# Patient Record
Sex: FEMALE | Race: BLACK OR AFRICAN AMERICAN | NOT HISPANIC OR LATINO | Employment: FULL TIME | URBAN - METROPOLITAN AREA
[De-identification: names, ages, dates, MRNs, and addresses within clinical notes are randomized per-mention and may not be internally consistent; named-entity substitution may affect disease eponyms.]

---

## 2017-01-03 ENCOUNTER — TELEPHONE (OUTPATIENT)
Dept: OBSTETRICS AND GYNECOLOGY | Facility: CLINIC | Age: 37
End: 2017-01-03

## 2017-01-03 DIAGNOSIS — Z30.430 ENCOUNTER FOR IUD INSERTION: Primary | ICD-10-CM

## 2017-01-03 NOTE — TELEPHONE ENCOUNTER
----- Message from Radha Wheeler sent at 1/3/2017  2:27 PM CST -----  Contact: Patient  _1st Request  _ 2nd Request  _X 3rd Request        Who: TAMAR BARAKAT [[id number]]     Why: Patient is calling to schedule a IUD procedure. She states per midwife she has to schedule when her menstrual cycle is down and its approaching soon.     What Number to Call Back: Patient can be reached at [phone]     When to Expect a call back: (Before the end of the day)  -- if call after 3:00 call back will be tomorrow.

## 2017-01-18 ENCOUNTER — PROCEDURE VISIT (OUTPATIENT)
Dept: OBSTETRICS AND GYNECOLOGY | Facility: CLINIC | Age: 37
End: 2017-01-18
Attending: OBSTETRICS & GYNECOLOGY
Payer: COMMERCIAL

## 2017-01-18 VITALS
WEIGHT: 214.5 LBS | BODY MASS INDEX: 36.62 KG/M2 | HEIGHT: 64 IN | DIASTOLIC BLOOD PRESSURE: 70 MMHG | SYSTOLIC BLOOD PRESSURE: 100 MMHG

## 2017-01-18 DIAGNOSIS — Z30.430 ENCOUNTER FOR IUD INSERTION: Primary | ICD-10-CM

## 2017-01-18 PROCEDURE — 58300 INSERT INTRAUTERINE DEVICE: CPT | Mod: S$GLB,,, | Performed by: OBSTETRICS & GYNECOLOGY

## 2017-01-18 NOTE — PROCEDURES
Procedures   DATE: 01/18/2017    TIME: 1:23 PM    PROCEDURE:  ParaGard insertion under ultrasound guidance    INDICATION: contraception.  Patient has a history of a mirena, strings were not visible and a hysteroscopy was performed for removal.  At that time, perforation of the uterus occurred at the time of removal of the device requiring ex-lap.  Patient presents today for placement under ultrasound guidance.    PATIENT CONSENT: She was counseled on the risks, benefits, indications, and alternatives to IUD use.  She understands that with insertion there is a risk of bleeding, infection, and uterine perforation.  All questions are answered.  Consents were signed and witnessed by clinical staff.     LABS: UPT is negative.     Cervical cultures were not performed.    ANESTHESIA: NONE    PROCEDURE NOTE:    Time out performed.  The cervix was visualized with a speculum.  A single tooth tenaculum was placed on the anterior lip of the cervix.  The uterus sounds to 7cm using sterile technique.  The uterus was visualized by Transabdominal U/S and monitored throughout the entire procedure with realtime U/S.  A ParaGard was loaded and placed high in the uterine fundus without difficulty using sterile technique.  Position was verified by Ultrasound.  The string was was then cut.  The tenaculum and speculum were removed.    COMPLICATIONS: None    PATIENT DISPOSITION: The patient tolerated the procedure well.    Assessment:  1.  Contraceptive management/IUD insertion    Post IUD placement counseling:  Manage post IUD placement pain with NSAIDS, Tylenol or Rx per Medcard.  IUD danger signs and how to check for strings were discussed.  The IUD needs to be removed in 5 years for Mirena and 10 years for Copper IUD.    Follow up:  4 weeks for string check      Laurie Campos DO 01/18/2017 1:23 PM

## 2017-01-18 NOTE — MR AVS SNAPSHOT
[facility] - OB/[facility]N [address]  [address]  [place] [place] [address]  Phone: [phone]  Fax: [phone]                  Veronique Louise   2017 10:30 AM   Procedure visit    Description:  Female : 1980   Provider:  Laurie Campos DO   Department:  [facility] - OB/GYN [address]           Reason for Visit     Procedure           Diagnoses this Visit        Comments    Encounter for IUD insertion    -  Primary            To Do List           Future Appointments        Provider Department Dept Phone    2017 10:00 AM Kristi Avendaño CNM [facility] OBGYN [phone]    3/20/2017 8:30 AM LAB, [facility] [phone]      Goals (5 Years of Data)     None      [facility]     [facility] -  Assistance  Registered nurses in the [facility] Center provide clinical advisement, health education, appointment booking, and other advisory services.  Call for this free service at [phone].             Medications           Message regarding Medications     Verify the changes and/or additions to your medication regime listed below are the same as discussed with your clinician today.  If any of these changes or additions are incorrect, please notify your healthcare provider.             Verify that the below list of medications is an accurate representation of the medications you are currently taking.  If none reported, the list may be blank. If incorrect, please contact your healthcare provider. Carry this list with you in case of emergency.           Current Medications     buPROPion (WELLBUTRIN XL) 150 MG TB24 tablet 300 mg.     estradiol (ESTRACE) 0.5 MG tablet Take 0.5 mg by mouth once daily.    progesterone (PROMETRIUM) 100 MG capsule Take 100 mg by mouth once daily.           Clinical Reference Information           Vital Signs - Last Recorded  Most recent update: 2017 11:25 AM by Sita Granados MA     "BP Ht Wt LMP BMI    100/70 5' 4" (1.626 m) 97.3 kg (214 lb 8.1 oz) 12/10/2016 36.82 kg/m2      Blood Pressure          Most Recent Value    BP  100/70      Allergies as of 1/18/2017     No Known Allergies      Immunizations Administered on Date of Encounter - 1/18/2017     None      Orders Placed During Today's Visit      Normal Orders This Visit    US OB/GYN Procedure (Viewpoint)       "

## 2017-02-17 ENCOUNTER — OFFICE VISIT (OUTPATIENT)
Dept: OBSTETRICS AND GYNECOLOGY | Facility: CLINIC | Age: 37
End: 2017-02-17
Attending: OBSTETRICS & GYNECOLOGY
Payer: COMMERCIAL

## 2017-02-17 VITALS
WEIGHT: 218.25 LBS | BODY MASS INDEX: 37.26 KG/M2 | HEIGHT: 64 IN | SYSTOLIC BLOOD PRESSURE: 124 MMHG | DIASTOLIC BLOOD PRESSURE: 82 MMHG

## 2017-02-17 DIAGNOSIS — Z30.431 IUD CHECK UP: Primary | ICD-10-CM

## 2017-02-17 PROCEDURE — 99999 PR PBB SHADOW E&M-EST. PATIENT-LVL II: CPT | Mod: PBBFAC,,, | Performed by: OBSTETRICS & GYNECOLOGY

## 2017-02-17 PROCEDURE — 99499 UNLISTED E&M SERVICE: CPT | Mod: S$GLB,,, | Performed by: OBSTETRICS & GYNECOLOGY

## 2017-02-17 NOTE — MR AVS SNAPSHOT
"    [facility] [address]  [address]  [facility] [place] [address]  Phone: [phone]  Fax: [phone]                  Veronique Louise   2017 9:30 AM   Office Visit    Description:  Female : 1980   Provider:  Laurie Campos DO   Department:  [facility] - OB/GYN [address]           Reason for Visit     IUD Check                To Do List           Future Appointments        Provider Department Dept Phone    3/20/2017 8:30 AM LAB, [facility] [phone]      Goals (5 Years of Data)     None      [facility]     [facility] On [facility] Nurse Care [facility] -  Assistance  Registered nurses in the [facility] provide clinical advisement, health education, appointment booking, and other advisory services.  Call for this free service at [phone].             Medications           Message regarding Medications     Verify the changes and/or additions to your medication regime listed below are the same as discussed with your clinician today.  If any of these changes or additions are incorrect, please notify your healthcare provider.             Verify that the below list of medications is an accurate representation of the medications you are currently taking.  If none reported, the list may be blank. If incorrect, please contact your healthcare provider. Carry this list with you in case of emergency.           Current Medications     buPROPion (WELLBUTRIN XL) 150 MG TB24 tablet 300 mg.     estradiol (ESTRACE) 0.5 MG tablet Take 0.5 mg by mouth once daily.    progesterone (PROMETRIUM) 100 MG capsule Take 100 mg by mouth once daily.           Clinical Reference Information           Your Vitals Were     BP Height Weight Last Period BMI    124/82 5' 4" (1.626 m) 99 kg (218 lb 4.1 oz) 2016 37.46 kg/m2      Blood Pressure          Most Recent Value    BP  124/82      Allergies as of 2017     No Known Allergies      Immunizations Administered " on Date of Encounter - 2/17/2017     None      [facility] Sign-Up     Activating your [facility] account is as easy as 1-2-3!     1) Visit [email or web address], select Sign Up Now, enter this activation code and your date of birth, then select Next.  [email or web address]  Expires: 4/3/2017  9:44 AM      2) Create a username and password to use when you visit [facility] in the future and select a security question in case you lose your password and select Next.    3) Enter your e-mail address and click Sign Up!    Additional Information  If you have questions, please e-mail [email or web address] or call [phone] to talk to our [facility] staff. Remember, [facility] is NOT to be used for urgent needs. For medical emergencies, dial 911.         Language Assistance Services     ATTENTION: Language assistance services are available, free of charge. Please call [phone].      ATENCIÓN: Si habla español, tiene a stewart disposición servicios gratuitos de asistencia lingüística. Llame al [phone].     DAGOBERTO Ý: N?u b?n nói Ti?ng Vi?t, có các d?ch v? h? tr? ngôn ng? mi?n phí dành cho b?n. G?i s? [phone].         [facility] [address] complies with applicable Federal civil rights laws and does not discriminate on the basis of race, color, national origin, age, disability, or sex.

## 2018-07-31 ENCOUNTER — LAB VISIT (OUTPATIENT)
Dept: LAB | Facility: OTHER | Age: 38
End: 2018-07-31
Payer: COMMERCIAL

## 2018-07-31 ENCOUNTER — OFFICE VISIT (OUTPATIENT)
Dept: OBSTETRICS AND GYNECOLOGY | Facility: CLINIC | Age: 38
End: 2018-07-31
Payer: COMMERCIAL

## 2018-07-31 VITALS
BODY MASS INDEX: 36.28 KG/M2 | WEIGHT: 212.5 LBS | HEIGHT: 64 IN | SYSTOLIC BLOOD PRESSURE: 124 MMHG | DIASTOLIC BLOOD PRESSURE: 82 MMHG

## 2018-07-31 DIAGNOSIS — Z01.419 ENCOUNTER FOR GYNECOLOGICAL EXAMINATION WITHOUT ABNORMAL FINDING: ICD-10-CM

## 2018-07-31 DIAGNOSIS — B00.9 HSV (HERPES SIMPLEX VIRUS) INFECTION: Primary | ICD-10-CM

## 2018-07-31 DIAGNOSIS — Z00.00 PREVENTATIVE HEALTH CARE: ICD-10-CM

## 2018-07-31 DIAGNOSIS — Z11.3 SCREEN FOR STD (SEXUALLY TRANSMITTED DISEASE): ICD-10-CM

## 2018-07-31 DIAGNOSIS — Z86.32 HX GESTATIONAL DIABETES: ICD-10-CM

## 2018-07-31 LAB
HAV IGM SERPL QL IA: NEGATIVE
HBV CORE IGM SERPL QL IA: NEGATIVE
HBV SURFACE AG SERPL QL IA: NEGATIVE
HCV AB SERPL QL IA: NEGATIVE
HIV 1+2 AB+HIV1 P24 AG SERPL QL IA: NEGATIVE
RPR SER QL: NORMAL

## 2018-07-31 PROCEDURE — 99395 PREV VISIT EST AGE 18-39: CPT | Mod: S$GLB,,, | Performed by: ADVANCED PRACTICE MIDWIFE

## 2018-07-31 PROCEDURE — 36415 COLL VENOUS BLD VENIPUNCTURE: CPT

## 2018-07-31 PROCEDURE — 86592 SYPHILIS TEST NON-TREP QUAL: CPT

## 2018-07-31 PROCEDURE — 87660 TRICHOMONAS VAGIN DIR PROBE: CPT

## 2018-07-31 PROCEDURE — 99999 PR PBB SHADOW E&M-EST. PATIENT-LVL III: CPT | Mod: PBBFAC,,, | Performed by: ADVANCED PRACTICE MIDWIFE

## 2018-07-31 PROCEDURE — 87491 CHLMYD TRACH DNA AMP PROBE: CPT

## 2018-07-31 PROCEDURE — 86703 HIV-1/HIV-2 1 RESULT ANTBDY: CPT

## 2018-07-31 PROCEDURE — 87624 HPV HI-RISK TYP POOLED RSLT: CPT

## 2018-07-31 PROCEDURE — 80074 ACUTE HEPATITIS PANEL: CPT

## 2018-07-31 PROCEDURE — 88175 CYTOPATH C/V AUTO FLUID REDO: CPT

## 2018-07-31 RX ORDER — BUSPIRONE HYDROCHLORIDE 10 MG/1
10 TABLET ORAL 3 TIMES DAILY
COMMUNITY
End: 2019-12-13 | Stop reason: CLARIF

## 2018-07-31 RX ORDER — METHIMAZOLE 5 MG/1
2.5 TABLET ORAL DAILY
COMMUNITY
End: 2022-07-26

## 2018-07-31 RX ORDER — VALACYCLOVIR HYDROCHLORIDE 1 G/1
1000 TABLET, FILM COATED ORAL DAILY
Qty: 10 TABLET | Refills: 3 | Status: SHIPPED | OUTPATIENT
Start: 2018-07-31 | End: 2018-08-10

## 2018-07-31 NOTE — PROGRESS NOTES
Veronique Louise is a 38 y.o.  who presents today for her annual GYN exam.    C/C: annual GYN well woman preventative exam    HPI: She is currently sexually active and uses IUD for contraception. Happy with contraceptive method and wants to continue. Denies dyspareunia.  Has no specific GYN related concerns. Accepts STD testing. Reports long term chronic medical conditions - HSV - requests prn rx. No current sx.     MENSTRUAL HISTORY  Patient's last menstrual period was 2018.. She reports regular menses occurring every 28-30 days.  Menses last 5 days with moderate flow.  She denies dysmenorrhea.  She denies intermenstrual bleeding.    PAP HISTORY: last pap  negative    SOCIAL HISTORY: Denies emotional/mental/physical/sexual violence or abuse. Feels safe at home.    Review of patient's allergies indicates:  No Known Allergies  Past Medical History:   Diagnosis Date    Abnormal Pap smear age 20    Anemia     Breast abscess     L breast ; abscess I&D by surgery    DM (diabetes mellitus), gestational 2015    HSV infection     Nipple infection, obstetric, antepartum 2014    Postpartum depression 2013    Subclinical hyperthyroidism 2016     Past Surgical History:   Procedure Laterality Date    ABCESS DRAINAGE Left     L breast abscess I&D by surgery    INTRAUTERINE DEVICE INSERTION      uterine perforation during IUD removal. Surgical exploration to ensure no bowel perforation. No futher uterine surgery except IUD or scope perforation.    TONSILLECTOMY, ADENOIDECTOMY       Past Surgical History:   Procedure Laterality Date    ABCESS DRAINAGE Left     L breast abscess I&D by surgery    INTRAUTERINE DEVICE INSERTION      uterine perforation during IUD removal. Surgical exploration to ensure no bowel perforation. No futher uterine surgery except IUD or scope perforation.    TONSILLECTOMY, ADENOIDECTOMY       OB History    Para Term  AB  Living   3 3 3     2   SAB TAB Ectopic Multiple Live Births   0 0   0 2      # Outcome Date GA Lbr Rafael/2nd Weight Sex Delivery Anes PTL Lv   3 Term 04/04/15 39w6d / 00:06 3.86 kg (8 lb 8.2 oz) F Vag-Spont None N BRIGHT   2 Term 13 40w0d  3.402 kg (7 lb 8 oz) F Vag-Spont None N       Birth Comments:    1 Term 10/22/09 40w0d  3.374 kg (7 lb 7 oz) F Vag-Spont None N BRIGHT        OB History      Para Term  AB Living    3 3 3     2    SAB TAB Ectopic Multiple Live Births    0 0   0 2        Social History     Social History    Marital status:      Spouse name: Hayden - William    Number of children: N/A    Years of education: N/A     Occupational History    [employer]      used to do event planning     Social History Main Topics    Smoking status: Never Smoker    Smokeless tobacco: Never Used    Alcohol use 0.6 oz/week     1 Glasses of wine per week      Comment: drinks socially when not pregnant    Drug use: No    Sexual activity: Yes     Partners: Male     Birth control/ protection: IUD      Comment: mutually monogamous - planned pregnancy     Other Topics Concern    Not on file     Social History Narrative    Pt: ADITI, grew up in [place].    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    Pt's last child, Luz Marina, , choked on grape 2014. Pt does not wish to review this / visit grief at PNC visits.     Family History   Problem Relation Age of Onset    Diabetes Mother         Type II    Hypertension Mother     Colon cancer Father         dx at 62yo, past at 65yo 2246-0012    Hypertension Father     Colon cancer Paternal Grandfather         dx in 80s,  year later    Hypertension Sister     Breast cancer Neg Hx     Ovarian cancer Neg Hx      History   Sexual Activity    Sexual activity: Yes    Partners: Male    Birth control/ protection: IUD     Comment: mutually monogamous - planned pregnancy       Primary Doctor No     ROS  Constitutional/Gen:  "Negative--fever, weight change, fatigue   Skin:  Negative--Hirsutism, acne, rash  CV/vasc: Negative--chest pain, palpitations, syncope  Resp:  Negative--Cough, shortness of breath, wheezing  GI:  Negative--Nausea, vomiting, diarrhea, constipation, abdominal pain  :  Negative--Dysuria, vaginal discharge, genital sores, dyspareunia   Lymph:  Negative--Swollen glands, frequent infection  Heme:  Negative--Easy bruising or bleeding, clot  Breast: Negative--Mass, lump, nipple discharge, tenderness  MS:  Negative--Back/joint pain, muscle weakness, difficulty walking  Neuro: Negative--Numbness, tingling, confusion, headaches, seizures, dizziness  Psych: Negative--Depressed mood, anxiety, insomnia  Endocrine:  Negative--Polydipsia, polyuria, heat or cold intolerance    OBJECTIVE:  /82   Ht 5' 4" (1.626 m)   Wt 96.4 kg (212 lb 8.4 oz)   LMP 07/16/2018   BMI 36.48 kg/m²   Gen:  NAD, appears stated age, well groomed  Skin: warm and dry w/o rash  Head: normocephalic  Mouth: no caries  Neck: supple, no masses or enlargement  Lung: normal resp effort, CTAB  Heart: normal HR, RRR   Back: negative CVAT  Breast: bilaterally--no masses, tenderness, skin changes, or nipple discharge noted. Nipple inverted bilaterally.  Abdomen: soft, nontender, no masses, and bowel sounds normal  External genitalia: no lesions or discharge, normal hair distribution  Urethral meatus: normal size and location, no lesions or prolapse  Vagina: normal appearance, no lesions, no discharge, no evidence cystocele or rectocele.  Cervix: IUD strings visible at 2cm from os, cervix has normal appearance, no discharge, no lesions, negative CMT  Uterus: nontender, mobile, normal size, contour, and position.   Adnexa: no masses or tenderness  Anus: normal appearance, with no lesions or discharge. Internal exam deferred.  Extremities: FROM, with no edema or tenderness.  Neurologic: A&O x 4, non-focal, cranial nerves 2-12 grossly intact  Psych:  appropriate " affect and no signs of mood, thought or memory difficulty appreciated    ASSESSMENT/PLAN:   38 y.o. female  for GYN annual well woman exam  -- Discussed ASCCP pap guidelines. Last pap  negative. Pap today.   -- Accepts STD testing today.  -- Known hx of HSV. Rx provided. Reviewed health implications and contagious nature of virus.   -- Health Teaching:  Discussed appropriate weight and nutrition. Emphasized importance of calcium and vitamin D intake. Encouraged exercise, 30-40 min 3x a week or more. BSE and health maintenance reviewed. Discussed following PCP for annual health screening. Discussed BMI and normal ranges.   --Mammogram to start at 43yo  --Continue annual exams, return in 1 year or sooner prn    Contraceptive counseling, status, surveillance  -- IUD in place; she desires to continue method at this time    Personal hx of GDM  -- Encouraged annual testing. She states she is seeing endocrinologist Dr Stacey Chand at  and is being watched for thyroid issues. States has fasting labs scheduled there soon and will be completing DM screening as well. Encouraged annual testing for DM d/t hx GDM.    Fam hx colon CA  -- Encouraged to start surveillance at 43yo        Updated Medication List:  Current Outpatient Prescriptions   Medication Sig Dispense Refill    buPROPion (WELLBUTRIN XL) 150 MG TB24 tablet 300 mg.       busPIRone (BUSPAR) 10 MG tablet Take 10 mg by mouth 3 (three) times daily.      methIMAzole (TAPAZOLE) 5 MG Tab Take 5 mg by mouth 3 (three) times daily.      valACYclovir (VALTREX) 1000 MG tablet Take 1 tablet (1,000 mg total) by mouth once daily. for 10 days 10 tablet 3     No current facility-administered medications for this visit.         Yolis Huang CNM/NP  2018 12:26 PM

## 2018-08-01 LAB
CANDIDA RRNA VAG QL PROBE: POSITIVE
G VAGINALIS RRNA GENITAL QL PROBE: NEGATIVE
T VAGINALIS RRNA GENITAL QL PROBE: NEGATIVE

## 2018-08-02 ENCOUNTER — TELEPHONE (OUTPATIENT)
Dept: OBSTETRICS AND GYNECOLOGY | Facility: CLINIC | Age: 38
End: 2018-08-02

## 2018-08-02 LAB
C TRACH DNA SPEC QL NAA+PROBE: NOT DETECTED
N GONORRHOEA DNA SPEC QL NAA+PROBE: NOT DETECTED

## 2018-08-02 NOTE — TELEPHONE ENCOUNTER
Left voicemail  STDs all negative  + yeast - reviewed safe use of OTC monistat    Yolis Huang CNM/NP  Certified Nurse Midwife/Nurse Practitioner  8/2/2018 8:14 AM

## 2018-08-03 LAB
HPV HR 12 DNA CVX QL NAA+PROBE: NEGATIVE
HPV16 AG SPEC QL: NEGATIVE
HPV18 DNA SPEC QL NAA+PROBE: NEGATIVE

## 2019-09-24 DIAGNOSIS — B00.9 HSV (HERPES SIMPLEX VIRUS) INFECTION: ICD-10-CM

## 2019-09-24 RX ORDER — VALACYCLOVIR HYDROCHLORIDE 1 G/1
1000 TABLET, FILM COATED ORAL DAILY
Qty: 10 TABLET | Refills: 3 | OUTPATIENT
Start: 2019-09-24

## 2019-09-27 ENCOUNTER — TELEPHONE (OUTPATIENT)
Dept: OBSTETRICS AND GYNECOLOGY | Facility: HOSPITAL | Age: 39
End: 2019-09-27

## 2019-09-27 ENCOUNTER — TELEPHONE (OUTPATIENT)
Dept: OBSTETRICS AND GYNECOLOGY | Facility: CLINIC | Age: 39
End: 2019-09-27

## 2019-09-27 RX ORDER — VALACYCLOVIR HYDROCHLORIDE 1 G/1
1000 TABLET, FILM COATED ORAL 2 TIMES DAILY
Qty: 60 TABLET | Refills: 3 | Status: SHIPPED | OUTPATIENT
Start: 2019-09-27 | End: 2022-07-27

## 2019-09-27 NOTE — TELEPHONE ENCOUNTER
Voicemail message left for patient, walk in clinic number provided.  If unable, pt encouraged to go to urgent care.             ----- Message from Anali Chaves sent at 9/27/2019  9:01 AM CDT -----  Contact: Self      -------FST Request------      Name of Who is Calling: TAMAR BARAKAT [[id number]]    What is the request in detail: Pt states she is experiencing vaginal issues. Pt declined to elaborate any further. Pt states she needs to be seen today. Please contact to further discuss and advise.        Can the clinic reply by [facility]: N      What Number to Call Back if not in [facility]: [phone]

## 2019-09-27 NOTE — TELEPHONE ENCOUNTER
Pt states she cannot make appt times offered, she is seeing her PCP tomorrow and will just get script from her.  No need to refill today.  Pt states she will call us back if needs a follow up appt post pcp visit.

## 2019-09-28 NOTE — TELEPHONE ENCOUNTER
Veronique,    I put the order into you pharmacy.      Please feel free to call with any questions or concerns.       Wendy D Gerhardt, CNM/ANTOINETTE  9/27/2019 9:56 PM    ----- Message from Anali Post RN sent at 9/27/2019 10:48 AM CDT -----  Contact: Timothy with [facility]      ----- Message -----  From: Jones Reno  Sent: 9/27/2019   9:44 AM CDT  To: [facility] Staff    Name of Who is Calling: Timothy with [facility]    What is the request in detail: is needing approval for a refill for a Rx (Valacyclovir) Please contact to further discuss and advise      Can the clinic reply by [facility]: no    What Number to Call Back if not in [facility]: [phone]

## 2019-10-23 ENCOUNTER — OFFICE VISIT (OUTPATIENT)
Dept: OBSTETRICS AND GYNECOLOGY | Facility: CLINIC | Age: 39
End: 2019-10-23
Payer: COMMERCIAL

## 2019-10-23 VITALS
HEIGHT: 64 IN | SYSTOLIC BLOOD PRESSURE: 134 MMHG | WEIGHT: 212.75 LBS | DIASTOLIC BLOOD PRESSURE: 82 MMHG | BODY MASS INDEX: 36.32 KG/M2

## 2019-10-23 DIAGNOSIS — Z11.3 SCREENING EXAMINATION FOR SEXUALLY TRANSMITTED DISEASE: Primary | ICD-10-CM

## 2019-10-23 PROCEDURE — 99999 PR PBB SHADOW E&M-EST. PATIENT-LVL III: ICD-10-PCS | Mod: PBBFAC,,, | Performed by: ADVANCED PRACTICE MIDWIFE

## 2019-10-23 PROCEDURE — 99999 PR PBB SHADOW E&M-EST. PATIENT-LVL III: CPT | Mod: PBBFAC,,, | Performed by: ADVANCED PRACTICE MIDWIFE

## 2019-10-23 PROCEDURE — 99395 PR PREVENTIVE VISIT,EST,18-39: ICD-10-PCS | Mod: S$GLB,,, | Performed by: ADVANCED PRACTICE MIDWIFE

## 2019-10-23 PROCEDURE — 87801 DETECT AGNT MULT DNA AMPLI: CPT

## 2019-10-23 PROCEDURE — 87661 TRICHOMONAS VAGINALIS AMPLIF: CPT

## 2019-10-23 PROCEDURE — 99395 PREV VISIT EST AGE 18-39: CPT | Mod: S$GLB,,, | Performed by: ADVANCED PRACTICE MIDWIFE

## 2019-10-23 PROCEDURE — 87481 CANDIDA DNA AMP PROBE: CPT | Mod: 59

## 2019-10-23 PROCEDURE — 87491 CHLMYD TRACH DNA AMP PROBE: CPT | Mod: 59

## 2019-10-23 RX ORDER — CLONAZEPAM 0.5 MG/1
TABLET ORAL
Refills: 5 | COMMUNITY
Start: 2019-09-24 | End: 2022-07-26 | Stop reason: ALTCHOICE

## 2019-10-23 NOTE — PROGRESS NOTES
CC: Well woman exam    Veronique Louise is a 39 y.o. female  presents for well woman exam.  Patient's last menstrual period was 10/07/2019.  She desires STI screening and states she has had some discharge that isn't foul but unusual for her. She is using an IUD for contraception and is happy with it.       Last pap: 2018    Past Medical History:   Diagnosis Date    Abnormal Pap smear age 20    Anemia     Breast abscess     L breast ; abscess I&D by surgery    DM (diabetes mellitus), gestational 2015    HSV infection     Nipple infection, obstetric, antepartum 2014    Postpartum depression 2013    Subclinical hyperthyroidism 2016     Past Surgical History:   Procedure Laterality Date    ABCESS DRAINAGE Left     L breast abscess I&D by surgery    INTRAUTERINE DEVICE INSERTION      uterine perforation during IUD removal. Surgical exploration to ensure no bowel perforation. No futher uterine surgery except IUD or scope perforation.   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   Social Needs    Financial resource strain: Not on file    Food insecurity:     Worry: Not on file     Inability: Not on file    Transportation needs:     Medical: Not on file     Non-medical: Not on file   Tobacco Use    Smoking status: Never Smoker    Smokeless tobacco: Never Used   Substance and Sexual Activity    Alcohol use: Not Currently     Alcohol/week: 0.0 standard drinks    Drug use: No    Sexual activity: Yes     Partners: Male     Birth control/protection: IUD     Comment: mutually monogamous - planned pregnancy   Lifestyle    Physical activity:     Days per week: Not on file      "Minutes per session: Not on file    Stress: Not on file   Relationships    Social connections:     Talks on phone: Not on file     Gets together: Not on file     Attends [employer] service: Not on file     Active member of club or organization: Not on file     Attends meetings of clubs or organizations: Not on file     Relationship status: Not on file   Other Topics Concern    Not on file   Social History Narrative    Pt: AD[employer]I, grew up in [place].    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    Pt's last child, Luz Marina, , choked on grape 2014. Pt does not wish to review this / visit grief at PNC visits.     Family History   Problem Relation Age of Onset    Diabetes Mother         Type II    Hypertension Mother     Colon cancer Father         dx at 64yo, past at 63yo [id number]    Hypertension Father     Colon cancer Paternal Grandfather         dx in 80s,  year later    Hypertension Sister     Breast cancer Neg Hx     Ovarian cancer Neg Hx      OB History        3    Para   3    Term   3            AB        Living   2       SAB   0    TAB   0    Ectopic        Multiple   0    Live Births   2                 /82   Ht 5' 4" (1.626 m)   Wt 96.5 kg (212 lb 11.9 oz)   LMP 10/07/2019   BMI 36.52 kg/m²       ROS:  GENERAL: Denies weight gain or weight loss. Feeling well overall.   SKIN: Denies rash or lesions.   HEAD: Denies head injury or headache.   NODES: Denies enlarged lymph nodes.   CHEST: Denies chest pain or shortness of breath.   CARDIOVASCULAR: Denies palpitations or left sided chest pain.   ABDOMEN: No abdominal pain, constipation, diarrhea, nausea, vomiting or rectal bleeding.   URINARY: No frequency, dysuria, hematuria, or burning on urination.  REPRODUCTIVE: See HPI.   BREASTS: The patient performs breast self-examination and denies pain, lumps, or nipple discharge.   HEMATOLOGIC: No easy bruisability or excessive bleeding. "   MUSCULOSKELETAL: Denies joint pain or swelling.   NEUROLOGIC: Denies syncope or weakness.   PSYCHIATRIC: Denies depression, anxiety or mood swings.    PHYSICAL EXAM:  APPEARANCE: Well nourished, well developed, in no acute distress.  AFFECT: Alert and oriented x 3  SKIN: Warm, dry, & intact. No acne or hirsutism.  NECK: Neck symmetric without masses or thyromegaly  NODES: No cervical, axillary, epitrochlear, inguinal, or femoral lymph node enlargement  CHEST: Good respiratory effect.  ABDOMEN: Soft.  No tenderness or masses.  No hepatosplenomegaly.  No hernias.  BREASTS: Symmetrical, no skin changes or visible lesions.  No palpable masses, nipple discharge bilaterally.  PELVIC: Normal external genitalia without lesions.  Normal hair distribution.  Adequate perineal body, normal urethral meatus.  Vagina moist and well rugated without lesions or discharge.  Cervix pink, without lesions, discharge or tenderness. IUD strings visualized.  No significant cystocele or rectocele.  Bimanual exam shows uterus to be normal size, regular, mobile and nontender.  Adnexa without masses or tenderness.    EXTREMITIES: No edema.    ASSESSMENT/PLAN:  There are no diagnoses linked to this encounter.    Patient was counseled today on A.C.S. Pap guidelines and recommendations for yearly pelvic exams, mammograms and monthly self breast exams; to see her PCP for other health maintenance.     No follow-ups on file.

## 2019-10-24 LAB
C TRACH DNA SPEC QL NAA+PROBE: NOT DETECTED
HBV SURFACE AG SERPL QL IA: NEGATIVE
HIV-1 AND HIV-2 ANTIBODIES: NORMAL
N GONORRHOEA DNA SPEC QL NAA+PROBE: NOT DETECTED
RPR: NORMAL

## 2019-10-25 LAB
BACTERIAL VAGINOSIS DNA: POSITIVE
CANDIDA GLABRATA DNA: NEGATIVE
CANDIDA KRUSEI DNA: NEGATIVE
CANDIDA RRNA VAG QL PROBE: NEGATIVE
T VAGINALIS RRNA GENITAL QL PROBE: NEGATIVE

## 2019-10-26 ENCOUNTER — PATIENT MESSAGE (OUTPATIENT)
Dept: OBSTETRICS AND GYNECOLOGY | Facility: CLINIC | Age: 39
End: 2019-10-26

## 2019-10-28 ENCOUNTER — TELEPHONE (OUTPATIENT)
Dept: OBSTETRICS AND GYNECOLOGY | Facility: CLINIC | Age: 39
End: 2019-10-28

## 2019-10-28 NOTE — TELEPHONE ENCOUNTER
Called pt and reviewed Affirm results. She would like to try some suppositories (Boric acid, etc that she has at home) and defer additional treatment at this time. She knows to call back if she changes her mind.     Aniyah MORALES

## 2019-11-01 ENCOUNTER — PATIENT MESSAGE (OUTPATIENT)
Dept: OBSTETRICS AND GYNECOLOGY | Facility: CLINIC | Age: 39
End: 2019-11-01

## 2019-11-18 ENCOUNTER — TELEPHONE (OUTPATIENT)
Dept: OBSTETRICS AND GYNECOLOGY | Facility: OTHER | Age: 39
End: 2019-11-18

## 2019-11-18 NOTE — TELEPHONE ENCOUNTER
----- Message from Mariza Chavez sent at 11/18/2019  2:10 PM CST -----  Contact: TAMAR BARAKAT [[id number]]  Name of Who is Calling : TAMAR BARAKAT [[id number]]    What is the request in detail :     Patient is requesting a call from staff in regards to reoccuring infection patient would like to speak to only RESHMA MADERA   .....Please contact to further discuss and advise.    Can the clinic reply by [facility] : Yes    What Number to Call Back : [phone]

## 2019-11-19 ENCOUNTER — TELEPHONE (OUTPATIENT)
Dept: OBSTETRICS AND GYNECOLOGY | Facility: HOSPITAL | Age: 39
End: 2019-11-19

## 2019-11-19 NOTE — TELEPHONE ENCOUNTER
----- Message from Lottie Preston RN sent at 11/19/2019  2:56 PM CST -----  Contact: TAMAR PARKS [[id number]]      ----- Message -----  From: Kat Almeida  Sent: 11/19/2019   1:39 PM CST  To: [facility] Clinical Staff    Name of Who is Calling : TAMAR BARAKAT [[id number]]     What is the request in detail :     Patient is requesting a call from staff in regards to reoccuring infection patient would like to speak to only RESHMA MADERA   .....Please contact to further discuss and advise.     Can the clinic reply by [facility] : Yes     What Number to Call Back : [phone]

## 2019-11-20 ENCOUNTER — TELEPHONE (OUTPATIENT)
Dept: OBSTETRICS AND GYNECOLOGY | Facility: CLINIC | Age: 39
End: 2019-11-20

## 2019-12-10 ENCOUNTER — PROCEDURE VISIT (OUTPATIENT)
Dept: OBSTETRICS AND GYNECOLOGY | Facility: CLINIC | Age: 39
End: 2019-12-10
Payer: COMMERCIAL

## 2019-12-10 VITALS
BODY MASS INDEX: 36.55 KG/M2 | WEIGHT: 214.06 LBS | DIASTOLIC BLOOD PRESSURE: 72 MMHG | SYSTOLIC BLOOD PRESSURE: 114 MMHG | HEIGHT: 64 IN

## 2019-12-10 DIAGNOSIS — Z30.432 ENCOUNTER FOR REMOVAL OF INTRAUTERINE CONTRACEPTIVE DEVICE (IUD): ICD-10-CM

## 2019-12-10 DIAGNOSIS — N76.1 CHRONIC VAGINITIS: Primary | ICD-10-CM

## 2019-12-10 PROCEDURE — 87481 CANDIDA DNA AMP PROBE: CPT | Mod: 59

## 2019-12-10 PROCEDURE — 58301 PR REMOVE, INTRAUTERINE DEVICE: ICD-10-PCS | Mod: S$GLB,,, | Performed by: ADVANCED PRACTICE MIDWIFE

## 2019-12-10 PROCEDURE — 58301 REMOVE INTRAUTERINE DEVICE: CPT | Mod: S$GLB,,, | Performed by: ADVANCED PRACTICE MIDWIFE

## 2019-12-10 PROCEDURE — 87801 DETECT AGNT MULT DNA AMPLI: CPT

## 2019-12-10 RX ORDER — LURASIDONE HYDROCHLORIDE 20 MG/1
40 TABLET, FILM COATED ORAL NIGHTLY
COMMUNITY
Start: 2019-11-04 | End: 2020-01-10 | Stop reason: SDUPTHER

## 2019-12-10 NOTE — PROCEDURES
"   C/C: IUD removal    The patient presents for removal of the Paragard IUD, which was placed on 12/10/16. Patient states that she does not want the IUD anymore, and she would like it to be removed. Removal procedure including risks were reviewed and verbal consent was obtained after answering any patient questions.      Patient states that she has a history of recurrent bacterial vaginosis, and she thinks that the paragard is preventing the BV from being fully cleared from her body. Affirm test done today. Fishy odor noted upon speculum exam. Patient has tried flagyl (had a severe yeast infection following this), then when BV returned she tried boric acid suppository, then "yeast guard" suppository, and takes apple cider vinegar daily. She states that all of the treatments she has tried worked, but then she would notice the fishy smell again days later. Discussed that if she is positive for BV again, can send in prescription for different BV treatment, and can send in prescription for candidiasis treatment in case she gets another yeast infection following the treatment. Patient agrees to this. Patient is currently taking vaginal health probiotics.    PRE-IUD REMOVAL COUNSELING:  The patient was advised of minimal risks of bleeding and pain and she agrees to proceed.    PROCEDURE:  TIME OUT PERFORMED.  The patient was placed in the dorsal lithotomy position. A speculum was placed in the vagina to expose the cervix. The IUD strings were visualized and a ring forceps was applied to the strings. Gentle traction was applied and the IUD was removed without difficulty. The IUD appears intact and was shown to the patient. Positive hemostasis was noted. The patient tolerated the procedure well and was given instructions on warning signs, the need for birth control, and when to call.      POST IUD REMOVAL COUNSELING:  Manage post IUD removal cramping with NSAIDs, Tylenol or Rx per [employer].    POST IUD REMOVAL CONTRACEPTION: " Patient desires to use condoms until she is able to have a bilateral tubal ligation. Patient verbalized understanding that condoms are not one of the top-tier most effective methods of contraception. She would like to have an appointment scheduled with a physician to get scheduled for a bilateral tubal ligation as soon as possible. Per MA, message sent to Dr. Foley's nurse to have patient scheduled with a physician.    Counseling lasted approximately 15 minutes and all her questions were answered.    FOLLOW-UP: At BTL consult with physician. Annual gyn exam or prn. Last pap 7/2018, results negative with negative HPV, next pap due 7/2021.

## 2019-12-12 ENCOUNTER — TELEPHONE (OUTPATIENT)
Dept: OBSTETRICS AND GYNECOLOGY | Facility: CLINIC | Age: 39
End: 2019-12-12

## 2019-12-12 DIAGNOSIS — N76.0 BACTERIAL VAGINOSIS: Primary | ICD-10-CM

## 2019-12-12 DIAGNOSIS — Z01.818 TUBAL LIGATION EVALUATION: Primary | ICD-10-CM

## 2019-12-12 DIAGNOSIS — N76.1 CHRONIC VAGINITIS: ICD-10-CM

## 2019-12-12 DIAGNOSIS — B96.89 BACTERIAL VAGINOSIS: Primary | ICD-10-CM

## 2019-12-12 RX ORDER — CLINDAMYCIN HYDROCHLORIDE 150 MG/1
300 CAPSULE ORAL 2 TIMES DAILY
Qty: 28 CAPSULE | Refills: 0 | Status: SHIPPED | OUTPATIENT
Start: 2019-12-12 | End: 2019-12-19

## 2019-12-12 RX ORDER — FLUCONAZOLE 150 MG/1
150 TABLET ORAL ONCE
Qty: 1 TABLET | Refills: 0 | Status: SHIPPED | OUTPATIENT
Start: 2019-12-12 | End: 2019-12-12

## 2019-12-12 NOTE — TELEPHONE ENCOUNTER
Spoke with pt. Pt would like to have surgery on 12/26/19. Case request pended and sent to Dr Foley to review and sign. Pre op appts scheduled. Pt verbalized understanding.

## 2019-12-12 NOTE — TELEPHONE ENCOUNTER
----- Message from Petr Foley Jr., MD sent at 2019 10:26 AM CST -----  Regarding: RE: Tubal  Tab,  Call her and get her on the schedule.    Petr Foley MD    ----- Message -----  From: Garrett Anaya MA  Sent: 2019   3:58 PM CST  To: Petr Foley Jr., MD  Subject: FW: Tubal                                        Would you like to schedule her for a tubal consult?  ----- Message -----  From: Monse Yanes MA  Sent: 12/10/2019   2:37 PM CST  To: Garrett Anaya MA  Subject: Tubal                                            Hi Garrett! I have another patient interested in a tubal ligation. She was approved and scheduled with Dr. Campos and was told to r/s due to a change in Dr. Campos's schedule. Her insurance will  soon. Please reach out to her to schedule with Dr. Foley.    Thanks!  Jeff

## 2019-12-13 ENCOUNTER — HOSPITAL ENCOUNTER (OUTPATIENT)
Dept: PREADMISSION TESTING | Facility: OTHER | Age: 39
Discharge: HOME | End: 2019-12-13
Attending: OBSTETRICS & GYNECOLOGY
Payer: COMMERCIAL

## 2019-12-13 ENCOUNTER — ANESTHESIA EVENT (OUTPATIENT)
Dept: SURGERY | Facility: OTHER | Age: 39
End: 2019-12-13
Payer: COMMERCIAL

## 2019-12-13 ENCOUNTER — OFFICE VISIT (OUTPATIENT)
Dept: OBSTETRICS AND GYNECOLOGY | Facility: CLINIC | Age: 39
End: 2019-12-13
Payer: COMMERCIAL

## 2019-12-13 VITALS
BODY MASS INDEX: 37.83 KG/M2 | OXYGEN SATURATION: 100 % | DIASTOLIC BLOOD PRESSURE: 78 MMHG | TEMPERATURE: 98 F | HEART RATE: 66 BPM | WEIGHT: 221.56 LBS | HEIGHT: 64 IN | SYSTOLIC BLOOD PRESSURE: 115 MMHG

## 2019-12-13 VITALS
HEIGHT: 64 IN | BODY MASS INDEX: 37.83 KG/M2 | WEIGHT: 221.56 LBS | SYSTOLIC BLOOD PRESSURE: 118 MMHG | DIASTOLIC BLOOD PRESSURE: 78 MMHG

## 2019-12-13 DIAGNOSIS — Z01.818 PREOP TESTING: Primary | ICD-10-CM

## 2019-12-13 DIAGNOSIS — Z01.818 TUBAL LIGATION EVALUATION: Primary | ICD-10-CM

## 2019-12-13 PROBLEM — Z30.432 ENCOUNTER FOR REMOVAL OF INTRAUTERINE CONTRACEPTIVE DEVICE (IUD): Status: RESOLVED | Noted: 2019-12-10 | Resolved: 2019-12-13

## 2019-12-13 PROBLEM — Z86.32 HX GESTATIONAL DIABETES: Status: RESOLVED | Noted: 2018-07-31 | Resolved: 2019-12-13

## 2019-12-13 LAB
ABO + RH BLD: NORMAL
BASOPHILS # BLD AUTO: 0.04 K/UL (ref 0–0.2)
BASOPHILS NFR BLD: 0.5 % (ref 0–1.9)
BLD GP AB SCN CELLS X3 SERPL QL: NORMAL
DIFFERENTIAL METHOD: ABNORMAL
EOSINOPHIL # BLD AUTO: 0.1 K/UL (ref 0–0.5)
EOSINOPHIL NFR BLD: 0.7 % (ref 0–8)
ERYTHROCYTE [DISTWIDTH] IN BLOOD BY AUTOMATED COUNT: 12.6 % (ref 11.5–14.5)
HCT VFR BLD AUTO: 35.3 % (ref 37–48.5)
HGB BLD-MCNC: 11.1 G/DL (ref 12–16)
IMM GRANULOCYTES # BLD AUTO: 0.02 K/UL (ref 0–0.04)
IMM GRANULOCYTES NFR BLD AUTO: 0.2 % (ref 0–0.5)
LYMPHOCYTES # BLD AUTO: 3.1 K/UL (ref 1–4.8)
LYMPHOCYTES NFR BLD: 36.5 % (ref 18–48)
MCH RBC QN AUTO: 28.1 PG (ref 27–31)
MCHC RBC AUTO-ENTMCNC: 31.4 G/DL (ref 32–36)
MCV RBC AUTO: 89 FL (ref 82–98)
MONOCYTES # BLD AUTO: 0.7 K/UL (ref 0.3–1)
MONOCYTES NFR BLD: 8 % (ref 4–15)
NEUTROPHILS # BLD AUTO: 4.6 K/UL (ref 1.8–7.7)
NEUTROPHILS NFR BLD: 54.1 % (ref 38–73)
NRBC BLD-RTO: 0 /100 WBC
PLATELET # BLD AUTO: 275 K/UL (ref 150–350)
PMV BLD AUTO: 10 FL (ref 9.2–12.9)
RBC # BLD AUTO: 3.95 M/UL (ref 4–5.4)
WBC # BLD AUTO: 8.55 K/UL (ref 3.9–12.7)

## 2019-12-13 PROCEDURE — 86850 RBC ANTIBODY SCREEN: CPT

## 2019-12-13 PROCEDURE — 99999 PR PBB SHADOW E&M-EST. PATIENT-LVL II: CPT | Mod: PBBFAC,,, | Performed by: OBSTETRICS & GYNECOLOGY

## 2019-12-13 PROCEDURE — 99499 UNLISTED E&M SERVICE: CPT | Mod: S$GLB,,, | Performed by: OBSTETRICS & GYNECOLOGY

## 2019-12-13 PROCEDURE — 36415 COLL VENOUS BLD VENIPUNCTURE: CPT

## 2019-12-13 PROCEDURE — 99999 PR PBB SHADOW E&M-EST. PATIENT-LVL II: ICD-10-PCS | Mod: PBBFAC,,, | Performed by: OBSTETRICS & GYNECOLOGY

## 2019-12-13 PROCEDURE — 99499 NO LOS: ICD-10-PCS | Mod: S$GLB,,, | Performed by: OBSTETRICS & GYNECOLOGY

## 2019-12-13 PROCEDURE — 85025 COMPLETE CBC W/AUTO DIFF WBC: CPT

## 2019-12-13 RX ORDER — PREGABALIN 75 MG/1
75 CAPSULE ORAL
Status: CANCELLED | OUTPATIENT
Start: 2019-12-13 | End: 2019-12-13

## 2019-12-13 RX ORDER — LIDOCAINE HYDROCHLORIDE 10 MG/ML
0.5 INJECTION, SOLUTION EPIDURAL; INFILTRATION; INTRACAUDAL; PERINEURAL ONCE
Status: CANCELLED | OUTPATIENT
Start: 2019-12-13 | End: 2019-12-13

## 2019-12-13 RX ORDER — SODIUM CHLORIDE, SODIUM LACTATE, POTASSIUM CHLORIDE, CALCIUM CHLORIDE 600; 310; 30; 20 MG/100ML; MG/100ML; MG/100ML; MG/100ML
INJECTION, SOLUTION INTRAVENOUS CONTINUOUS
Status: CANCELLED | OUTPATIENT
Start: 2019-12-13

## 2019-12-13 RX ORDER — ACETAMINOPHEN 500 MG
1000 TABLET ORAL
Status: CANCELLED | OUTPATIENT
Start: 2019-12-13 | End: 2019-12-13

## 2019-12-13 RX ORDER — MUPIROCIN 20 MG/G
OINTMENT TOPICAL
Status: CANCELLED | OUTPATIENT
Start: 2019-12-13

## 2019-12-13 RX ORDER — FAMOTIDINE 20 MG/1
20 TABLET, FILM COATED ORAL
Status: CANCELLED | OUTPATIENT
Start: 2019-12-13 | End: 2019-12-13

## 2019-12-13 RX ORDER — SODIUM CHLORIDE 9 MG/ML
INJECTION, SOLUTION INTRAVENOUS CONTINUOUS
Status: CANCELLED | OUTPATIENT
Start: 2019-12-13

## 2019-12-13 RX ORDER — CELECOXIB 200 MG/1
400 CAPSULE ORAL
Status: CANCELLED | OUTPATIENT
Start: 2019-12-13 | End: 2019-12-13

## 2019-12-13 NOTE — DISCHARGE INSTRUCTIONS
PRE-ADMIT TESTING -  [phone]    [address]  [place] [facility]          Your surgery has been scheduled at [facility]. We are pleased to have the opportunity to serve you. For Further Information please call [phone].    On the day of surgery please report to the Information Desk on the 1st floor.    · CONTACT YOUR PHYSICIAN'S OFFICE THE DAY PRIOR TO YOUR SURGERY TO OBTAIN YOUR ARRIVAL TIME.     · The evening before surgery do not eat anything after 9 p.m. ( this includes hard candy, chewing gum and mints).  You may only have GATORADE, POWERADE AND WATER  from 9 p.m. until you leave your home.   DO NOT DRINK ANY LIQUIDS ON THE WAY TO THE HOSPITAL.      SPECIAL MEDICATION INSTRUCTIONS: TAKE medications checked off by the Anesthesiologist on your Medication List.    Angiogram Patients: Take medications as instructed by your physician, including aspirin.     Surgery Patients:    If you take ASPIRIN - Your PHYSICIAN/SURGEON will need to inform you IF/OR when you need to stop taking aspirin prior to your surgery.     Do Not take any medications containing IBUPROFEN.  Do Not Wear any make-up or dark nail polish   (especially eye make-up) to surgery. If you come to surgery with makeup on you will be required to remove the makeup or nail polish.    Do not shave your surgical area at least 5 days prior to your surgery. The surgical prep will be performed at the hospital according to Infection Control regulations.    Leave all valuables at home.   Do Not wear any jewelry or watches, including any metal in body piercings. Jewelry must be removed prior to coming to the hospital.  There is a possibility that rings that are unable to be removed may be cut off if they are on the surgical extremity.    Contact Lens must be removed before surgery. Either do not wear the contact lens or bring a case and solution for storage.  Please bring a container for eyeglasses or dentures as required.  Bring  any paperwork your physician has provided, such as consent forms,  history and physicals, doctor's orders, etc.   Bring comfortable clothes that are loose fitting to wear upon discharge. Take into consideration the type of surgery being performed.  Maintain your diet as advised per your physician the day prior to surgery.      Adequate rest the night before surgery is advised.   Park in the Parking lot behind the hospital or in the [facility] across the street from the parking lot. Parking is complimentary.  If you will be discharged the same day as your procedure, please arrange for a responsible adult to drive you home or to accompany you if traveling by taxi.   YOU WILL NOT BE PERMITTED TO DRIVE OR TO LEAVE THE HOSPITAL ALONE AFTER SURGERY.   It is strongly recommended that you arrange for someone to remain with you for the first 24 hrs following your surgery.    The Surgeon will speak to your family/visitor after your surgery regarding the outcome of your surgery and post op care.  The Surgeon may speak to you after your surgery, but there is a possibility you may not remember the details.  Please check with your family members regarding the conversation with the Surgeon.    We strongly recommend whoever is bringing you home be present for discharge instructions.  This will ensure a thorough understanding for your post op home care.    EACH PATIENT IS ALLOWED TWO FAMILY MEMBERS OR VISITORS IN THE ROOM AND IN THE WAITING ROOMS WHILE YOU ARE IN SURGERY. ALL CHILDREN MUST ALWAYS BE ACCOMPANIED BY AN ADULT.    Thank you for your cooperation.  The Staff of [facility].                Bathing Instructions with Hibiclens     Shower the evening before and morning of your procedure with Hibiclens:   Wash your face with water and your regular face wash/soap   Apply Hibiclens directly on your skin or on a wet washcloth and wash gently. When showering: Move away from the shower stream when  applying Hibiclens to avoid rinsing off too soon.   Rinse thoroughly with warm water   Do not dilute Hibiclens         Dry off as usual, do not use any deodorant, powder, body lotions, perfume, after shave or cologne.

## 2019-12-13 NOTE — H&P (VIEW-ONLY)
PT WANTS PERMANENT STERILIZATION.  COMPLETED CHILD BEARING. HAD COPPER IUD WITH CHRONIC BV.  HAD MIRENA ND REMOVED AFTER A PERF WITH X LAP.    ROS:  GENERAL: No fever, chills, fatigability or weight loss.  VULVAR: No pain, no lesions and no itching.  VAGINAL: No relaxation, no itching, no discharge, no abnormal bleeding and no lesions.  ABDOMEN: No abdominal pain. Denies nausea. Denies vomiting. No diarrhea. No constipation  BREAST: Denies pain. No lumps. No discharge.  URINARY: No incontinence, no nocturia, no frequency and no dysuria.  CARDIOVASCULAR: No chest pain. No shortness of breath. No leg cramps.  NEUROLOGICAL: No headaches. No vision changes.  The remainder of the review of systems was negative.    PE:  General Appearance: obese And Well developed. Well nourished. In no acute distress.  Vulva: Lesions: No.  Urethral Meatus: Normal size. Normal location. No lesions. No prolapse.  Urethra: No masses. No tenderness. No prolapse. No scarring.  Bladder: No masses. No tenderness.  Vagina: Mucosa NI:yes discharge no, atrophy no, cystocele no or rectocele no.  Cervix: Lesion: no  Stenotic: no Cervical motion tenderness: no  Uterus: Uterus size: 7 weeks. Support good. Uterus size: Normal  Adnexa: Masses: No Tenderness: No CDS Nodularity: No  Abdomen: obese No masses. No tenderness.  CHEST: CTA B  HEART: RRR  EXT: NO EDEMA        PROCEDURES:    PLAN:     DIAGNOSIS:  1. Tubal ligation evaluation        MEDICATIONS & ORDERS:     20 MIN D/W PT ON RISKS BS

## 2019-12-19 ENCOUNTER — TELEPHONE (OUTPATIENT)
Dept: OBSTETRICS AND GYNECOLOGY | Facility: CLINIC | Age: 39
End: 2019-12-19

## 2019-12-19 NOTE — TELEPHONE ENCOUNTER
Confirmed with patient, per Dr. Michelle that her arrival time for surgery is 6:30AM. Patient was satisfied and understood.

## 2019-12-19 NOTE — TELEPHONE ENCOUNTER
----- Message from Dawna Palma sent at 12/19/2019  8:09 AM CST -----  Contact: TAMAR BARAKAT   Type: Patient Call Back    Who called:TAMAR BARAKAT     What is the request in detail: Patient is requesting a call back. She states that she would like to confirm her arrival time for her procedure on 12/26.   Please contact to further advise.    Can the clinic reply by [facility]? No    Best call back number: [phone]    Additional Information: N/A

## 2019-12-26 ENCOUNTER — ANESTHESIA (OUTPATIENT)
Dept: SURGERY | Facility: OTHER | Age: 39
End: 2019-12-26
Payer: COMMERCIAL

## 2019-12-26 ENCOUNTER — HOSPITAL ENCOUNTER (OUTPATIENT)
Facility: OTHER | Age: 39
Discharge: HOME | End: 2019-12-26
Attending: OBSTETRICS & GYNECOLOGY | Admitting: OBSTETRICS & GYNECOLOGY
Payer: COMMERCIAL

## 2019-12-26 VITALS
TEMPERATURE: 99 F | WEIGHT: 221.56 LBS | RESPIRATION RATE: 16 BRPM | BODY MASS INDEX: 37.83 KG/M2 | OXYGEN SATURATION: 100 % | HEART RATE: 56 BPM | DIASTOLIC BLOOD PRESSURE: 68 MMHG | HEIGHT: 64 IN | SYSTOLIC BLOOD PRESSURE: 120 MMHG

## 2019-12-26 DIAGNOSIS — Z90.79 STATUS POST BILATERAL SALPINGECTOMY: Primary | ICD-10-CM

## 2019-12-26 DIAGNOSIS — Z01.818 TUBAL LIGATION EVALUATION: ICD-10-CM

## 2019-12-26 LAB
B-HCG UR QL: NEGATIVE
CTP QC/QA: YES

## 2019-12-26 PROCEDURE — 37000008 HC ANESTHESIA 1ST 15 MINUTES: Performed by: OBSTETRICS & GYNECOLOGY

## 2019-12-26 PROCEDURE — 63600175 PHARM REV CODE 636 W HCPCS: Performed by: SPECIALIST

## 2019-12-26 PROCEDURE — 37000009 HC ANESTHESIA EA ADD 15 MINS: Performed by: OBSTETRICS & GYNECOLOGY

## 2019-12-26 PROCEDURE — 25000003 PHARM REV CODE 250: Performed by: OBSTETRICS & GYNECOLOGY

## 2019-12-26 PROCEDURE — 25000003 PHARM REV CODE 250: Performed by: NURSE ANESTHETIST, CERTIFIED REGISTERED

## 2019-12-26 PROCEDURE — 81025 URINE PREGNANCY TEST: CPT | Performed by: SPECIALIST

## 2019-12-26 PROCEDURE — 88302 PR  SURG PATH,LEVEL II: ICD-10-PCS | Mod: 26,,, | Performed by: PATHOLOGY

## 2019-12-26 PROCEDURE — 58661 PR LAP,RMV  ADNEXAL STRUCTURE: ICD-10-PCS | Mod: 50,,, | Performed by: OBSTETRICS & GYNECOLOGY

## 2019-12-26 PROCEDURE — 71000016 HC POSTOP RECOV ADDL HR: Performed by: OBSTETRICS & GYNECOLOGY

## 2019-12-26 PROCEDURE — 88302 TISSUE EXAM BY PATHOLOGIST: CPT | Mod: 26,,, | Performed by: PATHOLOGY

## 2019-12-26 PROCEDURE — 71000039 HC RECOVERY, EACH ADD'L HOUR: Performed by: OBSTETRICS & GYNECOLOGY

## 2019-12-26 PROCEDURE — 88302 TISSUE EXAM BY PATHOLOGIST: CPT | Performed by: PATHOLOGY

## 2019-12-26 PROCEDURE — 27201423 OPTIME MED/SURG SUP & DEVICES STERILE SUPPLY: Performed by: OBSTETRICS & GYNECOLOGY

## 2019-12-26 PROCEDURE — 58661 LAPAROSCOPY REMOVE ADNEXA: CPT | Mod: 50,,, | Performed by: OBSTETRICS & GYNECOLOGY

## 2019-12-26 PROCEDURE — 36000708 HC OR TIME LEV III 1ST 15 MIN: Performed by: OBSTETRICS & GYNECOLOGY

## 2019-12-26 PROCEDURE — 63600175 PHARM REV CODE 636 W HCPCS: Performed by: NURSE ANESTHETIST, CERTIFIED REGISTERED

## 2019-12-26 PROCEDURE — 25000003 PHARM REV CODE 250: Performed by: SPECIALIST

## 2019-12-26 PROCEDURE — 71000015 HC POSTOP RECOV 1ST HR: Performed by: OBSTETRICS & GYNECOLOGY

## 2019-12-26 PROCEDURE — 71000033 HC RECOVERY, INTIAL HOUR: Performed by: OBSTETRICS & GYNECOLOGY

## 2019-12-26 PROCEDURE — 36000709 HC OR TIME LEV III EA ADD 15 MIN: Performed by: OBSTETRICS & GYNECOLOGY

## 2019-12-26 RX ORDER — CELECOXIB 200 MG/1
400 CAPSULE ORAL
Status: COMPLETED | OUTPATIENT
Start: 2019-12-26 | End: 2019-12-26

## 2019-12-26 RX ORDER — DIPHENHYDRAMINE HYDROCHLORIDE 50 MG/ML
25 INJECTION INTRAMUSCULAR; INTRAVENOUS EVERY 6 HOURS PRN
Status: DISCONTINUED | OUTPATIENT
Start: 2019-12-26 | End: 2019-12-26 | Stop reason: HOSPADM

## 2019-12-26 RX ORDER — MUPIROCIN 20 MG/G
OINTMENT TOPICAL
Status: DISCONTINUED | OUTPATIENT
Start: 2019-12-26 | End: 2019-12-26 | Stop reason: HOSPADM

## 2019-12-26 RX ORDER — LIDOCAINE HYDROCHLORIDE 10 MG/ML
0.5 INJECTION, SOLUTION EPIDURAL; INFILTRATION; INTRACAUDAL; PERINEURAL ONCE
Status: DISCONTINUED | OUTPATIENT
Start: 2019-12-26 | End: 2019-12-26 | Stop reason: HOSPADM

## 2019-12-26 RX ORDER — NEOSTIGMINE METHYLSULFATE 1 MG/ML
INJECTION, SOLUTION INTRAVENOUS
Status: DISCONTINUED | OUTPATIENT
Start: 2019-12-26 | End: 2019-12-26

## 2019-12-26 RX ORDER — SODIUM CHLORIDE, SODIUM LACTATE, POTASSIUM CHLORIDE, CALCIUM CHLORIDE 600; 310; 30; 20 MG/100ML; MG/100ML; MG/100ML; MG/100ML
INJECTION, SOLUTION INTRAVENOUS CONTINUOUS
Status: DISCONTINUED | OUTPATIENT
Start: 2019-12-26 | End: 2019-12-26 | Stop reason: HOSPADM

## 2019-12-26 RX ORDER — LIDOCAINE HCL/PF 100 MG/5ML
SYRINGE (ML) INTRAVENOUS
Status: DISCONTINUED | OUTPATIENT
Start: 2019-12-26 | End: 2019-12-26

## 2019-12-26 RX ORDER — FENTANYL CITRATE 50 UG/ML
INJECTION, SOLUTION INTRAMUSCULAR; INTRAVENOUS
Status: DISCONTINUED | OUTPATIENT
Start: 2019-12-26 | End: 2019-12-26

## 2019-12-26 RX ORDER — IBUPROFEN 600 MG/1
600 TABLET ORAL 3 TIMES DAILY
Qty: 30 TABLET | Refills: 0 | Status: SHIPPED | OUTPATIENT
Start: 2019-12-26 | End: 2020-01-10

## 2019-12-26 RX ORDER — HYDROCODONE BITARTRATE AND ACETAMINOPHEN 5; 325 MG/1; MG/1
1 TABLET ORAL EVERY 4 HOURS PRN
Qty: 15 TABLET | Refills: 0 | Status: SHIPPED | OUTPATIENT
Start: 2019-12-26 | End: 2020-01-10

## 2019-12-26 RX ORDER — SODIUM CHLORIDE 0.9 % (FLUSH) 0.9 %
3 SYRINGE (ML) INJECTION
Status: DISCONTINUED | OUTPATIENT
Start: 2019-12-26 | End: 2019-12-26 | Stop reason: HOSPADM

## 2019-12-26 RX ORDER — GLYCOPYRROLATE 0.2 MG/ML
INJECTION INTRAMUSCULAR; INTRAVENOUS
Status: DISCONTINUED | OUTPATIENT
Start: 2019-12-26 | End: 2019-12-26

## 2019-12-26 RX ORDER — MEPERIDINE HYDROCHLORIDE 25 MG/ML
12.5 INJECTION INTRAMUSCULAR; INTRAVENOUS; SUBCUTANEOUS ONCE AS NEEDED
Status: DISCONTINUED | OUTPATIENT
Start: 2019-12-26 | End: 2019-12-26 | Stop reason: HOSPADM

## 2019-12-26 RX ORDER — MIDAZOLAM HYDROCHLORIDE 1 MG/ML
INJECTION INTRAMUSCULAR; INTRAVENOUS
Status: DISCONTINUED | OUTPATIENT
Start: 2019-12-26 | End: 2019-12-26

## 2019-12-26 RX ORDER — ONDANSETRON 2 MG/ML
4 INJECTION INTRAMUSCULAR; INTRAVENOUS DAILY PRN
Status: DISCONTINUED | OUTPATIENT
Start: 2019-12-26 | End: 2019-12-26 | Stop reason: HOSPADM

## 2019-12-26 RX ORDER — ONDANSETRON 2 MG/ML
INJECTION INTRAMUSCULAR; INTRAVENOUS
Status: DISCONTINUED | OUTPATIENT
Start: 2019-12-26 | End: 2019-12-26

## 2019-12-26 RX ORDER — HYDROMORPHONE HYDROCHLORIDE 2 MG/ML
0.4 INJECTION, SOLUTION INTRAMUSCULAR; INTRAVENOUS; SUBCUTANEOUS EVERY 5 MIN PRN
Status: DISCONTINUED | OUTPATIENT
Start: 2019-12-26 | End: 2019-12-26 | Stop reason: HOSPADM

## 2019-12-26 RX ORDER — ACETAMINOPHEN 500 MG
1000 TABLET ORAL
Status: COMPLETED | OUTPATIENT
Start: 2019-12-26 | End: 2019-12-26

## 2019-12-26 RX ORDER — PROPOFOL 10 MG/ML
VIAL (ML) INTRAVENOUS
Status: DISCONTINUED | OUTPATIENT
Start: 2019-12-26 | End: 2019-12-26

## 2019-12-26 RX ORDER — ROCURONIUM BROMIDE 10 MG/ML
INJECTION, SOLUTION INTRAVENOUS
Status: DISCONTINUED | OUTPATIENT
Start: 2019-12-26 | End: 2019-12-26

## 2019-12-26 RX ORDER — FAMOTIDINE 20 MG/1
20 TABLET, FILM COATED ORAL
Status: COMPLETED | OUTPATIENT
Start: 2019-12-26 | End: 2019-12-26

## 2019-12-26 RX ORDER — PREGABALIN 75 MG/1
75 CAPSULE ORAL
Status: COMPLETED | OUTPATIENT
Start: 2019-12-26 | End: 2019-12-26

## 2019-12-26 RX ORDER — SODIUM CHLORIDE 9 MG/ML
INJECTION, SOLUTION INTRAVENOUS CONTINUOUS
Status: DISCONTINUED | OUTPATIENT
Start: 2019-12-26 | End: 2019-12-26 | Stop reason: HOSPADM

## 2019-12-26 RX ADMIN — NEOSTIGMINE METHYLSULFATE 5 MG: 1 INJECTION INTRAVENOUS at 09:12

## 2019-12-26 RX ADMIN — Medication 800 MG: at 08:12

## 2019-12-26 RX ADMIN — SODIUM CHLORIDE, SODIUM LACTATE, POTASSIUM CHLORIDE, AND CALCIUM CHLORIDE: 600; 310; 30; 20 INJECTION, SOLUTION INTRAVENOUS at 07:12

## 2019-12-26 RX ADMIN — MIDAZOLAM HYDROCHLORIDE 2 MG: 1 INJECTION, SOLUTION INTRAMUSCULAR; INTRAVENOUS at 08:12

## 2019-12-26 RX ADMIN — PREGABALIN 75 MG: 75 CAPSULE ORAL at 07:12

## 2019-12-26 RX ADMIN — PROPOFOL 200 MG: 10 INJECTION, EMULSION INTRAVENOUS at 08:12

## 2019-12-26 RX ADMIN — GLYCOPYRROLATE 0.2 MG: 0.2 INJECTION, SOLUTION INTRAMUSCULAR; INTRAVENOUS at 08:12

## 2019-12-26 RX ADMIN — ACETAMINOPHEN 1000 MG: 500 TABLET, FILM COATED ORAL at 07:12

## 2019-12-26 RX ADMIN — HYDROMORPHONE HYDROCHLORIDE 0.4 MG: 2 INJECTION, SOLUTION INTRAMUSCULAR; INTRAVENOUS; SUBCUTANEOUS at 10:12

## 2019-12-26 RX ADMIN — CELECOXIB 400 MG: 200 CAPSULE ORAL at 07:12

## 2019-12-26 RX ADMIN — FENTANYL CITRATE 150 MCG: 50 INJECTION, SOLUTION INTRAMUSCULAR; INTRAVENOUS at 08:12

## 2019-12-26 RX ADMIN — ONDANSETRON 4 MG: 2 INJECTION INTRAMUSCULAR; INTRAVENOUS at 09:12

## 2019-12-26 RX ADMIN — LIDOCAINE HYDROCHLORIDE 50 MG: 20 INJECTION, SOLUTION INTRAVENOUS at 08:12

## 2019-12-26 RX ADMIN — HYDROMORPHONE HYDROCHLORIDE 0.4 MG: 2 INJECTION, SOLUTION INTRAMUSCULAR; INTRAVENOUS; SUBCUTANEOUS at 09:12

## 2019-12-26 RX ADMIN — PROMETHAZINE HYDROCHLORIDE 6.25 MG: 25 INJECTION INTRAMUSCULAR; INTRAVENOUS at 10:12

## 2019-12-26 RX ADMIN — FAMOTIDINE 20 MG: 20 TABLET, FILM COATED ORAL at 07:12

## 2019-12-26 RX ADMIN — GLYCOPYRROLATE 0.6 MG: 0.2 INJECTION, SOLUTION INTRAMUSCULAR; INTRAVENOUS at 09:12

## 2019-12-26 RX ADMIN — ROCURONIUM BROMIDE 40 MG: 10 INJECTION, SOLUTION INTRAVENOUS at 08:12

## 2019-12-26 RX ADMIN — MUPIROCIN: 20 OINTMENT TOPICAL at 07:12

## 2019-12-26 NOTE — DISCHARGE INSTRUCTIONS
Abdominal Laparoscopy Discharge Instructions      Activity  · Limit your activity for 4-6 weeks.  · Dont lift anything heavier than 5-10 pounds.  · Avoid strenuous activities, such as mowing the lawn, vacuuming, or playing sports.  · Limit your activity to short, slow walks. Gradually increase your pace and distance as you feel able.  · Listen to your body. If an activity causes pain, stop.  · Rest when you are tired.  · Dont have sexual intercourse or use tampons or douches until your doctor says its safe to do so.    Home Care  · Always keep your incision clean and dry.  · Shower as instructed in 24 hours. You may wash your incision gently with mild soap and warm water and pat dry.  Avoid tub baths, hot tubs and swimming pools until seen by your physician for a post-op follow up.  · If you have steri strips they should fall off in 7-10 days.    · If you have band aids covering your incisions change daily or when soiled.  The band aids may be removed post op day 1 if they are clean and dry.  · Take your medication exactly as instructed by your doctor.  · Return to your diet as you feel able. Eat a healthy, well-balanced diet.  · Avoid constipation.  ¨ Use laxatives, stool softeners, or enemas as directed by your doctor.  ¨ Eat more high-fiber foods.  ¨ Drink 6-8 glasses of water every day, unless directed otherwise.    Follow-Up  Make a follow-up appointment as directed by our staff.       When to Call Your Doctor  Call your doctor right away if you have any of the following:  · Fever above 101.5°F  (38.6°C) or chills  · Bright red vaginal bleeding or a foul-smelling discharge  · Vaginal bleeding that soaks more than one sanitary pad per hour for 2 consecutive hours  · Trouble urinating or burning sensation when you urinate  · Severe abdominal pain or bloating  · Redness, swelling, or drainage at your incision site  · Shortness of breath         ________________________________________________________________  FOR EMERGENCIES:  If any unusual problems or difficulties occur, contact Dr. Foley or the resident at [phone] (page ) or at the Clinic office, [phone].          Discharge Instructions: After Your Surgery  Youve just had surgery. During surgery, you were given medicine called anesthesia to keep you relaxed and free of pain. After surgery, you may have some pain or nausea. This is common. Here are some tips for feeling better and getting well after surgery.     Stay on schedule with your medicine.   Going home  Your healthcare provider will show you how to take care of yourself when you go home. He or she will also answer your questions. Have an adult family member or friend drive you home. For the first 24 hours after your surgery:    · Do not drive or use heavy equipment.  · Do not make important decisions or sign legal papers.  · Do not drink alcohol.  · Have someone stay with you, if needed. He or she can watch for problems and help keep you safe.    Be sure to go to all follow-up visits with your healthcare provider. And rest after your surgery for as long as your healthcare provider tells you to.    Coping with pain  If you have pain after surgery, pain medicine will help you feel better. Take it as told, before pain becomes severe. Also, ask your healthcare provider or pharmacist about other ways to control pain. This might be with heat, ice, or relaxation. And follow any other instructions your surgeon or nurse gives you.    Tips for taking pain medicine  To get the best relief possible, remember these points:    · Pain medicines can upset your stomach. Taking them with a little food may help.  · Most pain relievers taken by mouth need at least 20 to 30 minutes to start to work.  · Taking medicine on a schedule can help you remember to take it. Try to time your medicine so that you can take it before starting an activity. This  might be before you get dressed, go for a walk, or sit down for dinner.  · Constipation is a common side effect of pain medicines. Call your healthcare provider before taking any medicines such as laxatives or stool softeners to help ease constipation. Also ask if you should skip any foods. Drinking lots of fluids and eating foods such as fruits and vegetables that are high in fiber can also help. Remember, do not take laxatives unless your surgeon has prescribed them.  · Drinking alcohol and taking pain medicine can cause dizziness and slow your breathing. It can even be deadly. Do not drink alcohol while taking pain medicine.  · Pain medicine can make you react more slowly to things. Do not drive or run machinery while taking pain medicine.    Your healthcare provider may tell you to take acetaminophen to help ease your pain. Ask him or her how much you are supposed to take each day. Acetaminophen or other pain relievers may interact with your prescription medicines or other over-the-counter (OTC) medicines. Some prescription medicines have acetaminophen and other ingredients. Using both prescription and OTC acetaminophen for pain can cause you to overdose. Read the labels on your OTC medicines with care. This will help you to clearly know the list of ingredients, how much to take, and any warnings. It may also help you not take too much acetaminophen. If you have questions or do not understand the information, ask your pharmacist or healthcare provider to explain it to you before you take the OTC medicine.    Managing nausea  Some people have an upset stomach after surgery. This is often because of anesthesia, pain, or pain medicine, or the stress of surgery. These tips will help you handle nausea and eat healthy foods as you get better. If you were on a special food plan before surgery, ask your healthcare provider if you should follow it while you get better. These tips may help:    · Do not push yourself to  eat. Your body will tell you when to eat and how much.  · Start off with clear liquids and soup. They are easier to digest.  · Next try semi-solid foods, such as mashed potatoes, applesauce, and gelatin, as you feel ready.  · Slowly move to solid foods. Dont eat fatty, rich, or spicy foods at first.  · Do not force yourself to have 3 large meals a day. Instead eat smaller amounts more often.  · Take pain medicines with a small amount of solid food, such as crackers or toast, to avoid nausea.     Call your surgeon if  · You still have pain an hour after taking medicine. The medicine may not be strong enough.  · You feel too sleepy, dizzy, or groggy. The medicine may be too strong.  · You have side effects like nausea, vomiting, or skin changes, such as rash, itching, or hives.       If you have obstructive sleep apnea  You were given anesthesia medicine during surgery to keep you comfortable and free of pain. After surgery, you may have more apnea spells because of this medicine and other medicines you were given. The spells may last longer than usual.   At home:    · Keep using the continuous positive airway pressure (CPAP) device when you sleep. Unless your healthcare provider tells you not to, use it when you sleep, day or night. CPAP is a common device used to treat obstructive sleep apnea.  · Talk with your provider before taking any pain medicine, muscle relaxants, or sedatives. Your provider will tell you about the possible dangers of taking these medicines.    Date Last Reviewed: 12/1/2016 © 2000-2017 [employer]. [address] [place] [address], [place] [address]. All rights reserved. This information is not intended as a substitute for professional medical care. Always follow your healthcare professional's instructions.    PLEASE FOLLOW ANY OTHER INSTRUCTIONS PROVIDED TO YOU BY DR. ELLSWORTH!

## 2019-12-26 NOTE — ANESTHESIA PROCEDURE NOTES
Intubation  Performed by: Stacey Lloyd CRNA  Authorized by: Riky Soriano MD     Intubation:     Induction:  Inhalational - mask    Intubated:  Postinduction    Attempts:  1    Attempted By:  CRNA    Method of Intubation:  Video laryngoscopy    Blade:  Lawernce 3    Laryngeal View Grade: Grade I - full view of chords      Difficult Airway Encountered?: No      Complications:  None    Airway Device:  Oral endotracheal tube    Airway Device Size:  7.5    Style/Cuff Inflation:  Cuffed    Tube secured:  5    Secured at:  The lips    Placement Verified By:  Capnometry    Complicating Factors:  None    Findings Post-Intubation:  BS equal bilateral

## 2019-12-26 NOTE — ANESTHESIA POSTPROCEDURE EVALUATION
Anesthesia Post Evaluation    Patient: Veronique Louise    Procedure(s) Performed: Procedure(s) (LRB):  SALPINGECTOMY, LAPAROSCOPIC (Bilateral)    Final Anesthesia Type: general    Patient location during evaluation: PACU  Patient participation: Yes- Able to Participate  Level of consciousness: awake and alert  Post-procedure vital signs: reviewed and stable  Pain management: adequate  Airway patency: patent    PONV status at discharge: No PONV  Anesthetic complications: no      Cardiovascular status: blood pressure returned to baseline  Respiratory status: unassisted and room air  Hydration status: euvolemic  Follow-up not needed.          Vitals Value Taken Time   /70 12/26/2019 10:30 AM   Temp 36.9 °C (98.5 °F) 12/26/2019  9:41 AM   Pulse 46 12/26/2019 10:38 AM   Resp 16 12/26/2019 10:15 AM   SpO2 99 % 12/26/2019 10:38 AM   Vitals shown include unvalidated device data.      No case tracking events are documented in the log.      Pain/Tulio Score: Pain Rating Prior to Med Admin: 7 (12/26/2019 10:00 AM)  Pain Rating Post Med Admin: 7 (12/26/2019 10:11 AM)  Tulio Score: 9 (12/26/2019 10:11 AM)

## 2019-12-26 NOTE — DISCHARGE SUMMARY
[facility]  Brief Operative Note    Surgery Date: 12/26/2019     Surgeon(s) and Role:     * Petr Foley Jr., MD - Primary     * Jennifer Godinez MD - Resident - Assisting    Pre-op Diagnosis:  Tubal ligation evaluation [Z01.818]    Post-op Diagnosis:  Post-Op Diagnosis Codes:     * Tubal ligation evaluation [Z01.818]    Procedure(s) (LRB):  SALPINGECTOMY, LAPAROSCOPIC (Bilateral)    Anesthesia: General    Description of the findings of the procedure(s):   1. Lysis of omental adhesion to anterior abdominal wall near umbilicus  2. Normal pelvis and abdomen  3. Bilateral salpingectomy performed without difficulty    Estimated Blood Loss: minimal         Specimens:   Specimen (12h ago, onward)    None            Discharge Note    OUTCOME: Patient tolerated treatment/procedure well without complication and is now ready for discharge.    DISPOSITION: Home or Self Care    FINAL DIAGNOSIS:  Status post bilateral salpingectomy    FOLLOWUP: In clinic     Petr Foley MD

## 2019-12-26 NOTE — OP NOTE
Operative Note    Date of Procedure: 12/26/2019    Pre-Operative Diagnosis: Desires permanent sterilization,     Post-Operative Diagnosis: Same    Procedure: Laparascpoic salpingectomy    Surgeon: Petr Foley MD    Assistant: Jennifer Godinez MD - PGY2    Anesthesia: general    EBL:  minimal    IVF: 800 cc    UOP: 100 cc    Specimen: bilateral fallopian tubes    Complications: none    Findings:   1. Lysis of omental adhesion to anterior abdominal wall near umbilicus  2. Normal pelvis and abdomen  3. Bilateral salpingectomy performed without difficulty    Procedure in Detail:  After verifying consent, the patient was taken to the OR where she was placed in the dorsal supine position after general anesthesia was found to be adequate. The vagina was prepped in the sterile fashion, a gonzales catheter was placed. A speculum was placed in the vagina and the anterior lip of the cervix was grasped with a tenaculum, the uterus was sounded to 9cm and a Karen uterine manipulator was placed.  The abdomen was then prepped and draped in the normal, sterile fashion and upward traction was applied to the abdomen. Veress needle was placed through the umbilicus placement was confirmed with water drop test, and the abdomen was insufflated.. The skin incision was made using the scalpel superior to the umbilicus.   A 5mm trocar was placed and the laparoscope was placed through the trochar. A small omental adhesion was noted near the umbilicus. The uterus, tubes and ovaries appeared normal, without adhesions.      Then, an incision was made in the left lower quadrant, and 5 mm trocar was introduced under direct visualization. An incision was also made in the right lower quadrant, and 5 mm trocar was introduced, also under direct visualization. Good hemostasis was noted and there was no evidence of injury from abdominal entry. The ligasure was placed through the right port and used to take down the omental adhesion. A grasper was then  placed through the right port and the end of the left fallopian tube was grasped to provide traction. The Ligasure was used to remove the entire fallopian tube, paying careful attention to stay directly inferior to the tube and away from the ovary. The left tube was then transected at the cornua of the uterus using the Ligasure. Excellent hemostasis was noted. The tube was removed through the port under direct visualization.  A grasper was then placed through the legt port and the end of the right fallopian tube was grasped to provide traction. The Ligasure was used to remove the entire fallopian tube, paying careful attention to stay directly inferior to the tube and away from the ovary. The tube was then transected at the cornua of the uterus using the Ligasure. Excellent hemostasis was noted. The right tube was removed through the right port without difficulty. The right and left ovary appeared to be within normal limits. The pressure in the abdomen was decreased and hemostasis was still appreciated.    The procedure was then completed by removing the right and left lateral trocars under direct visualization. The laparoscope was removed and the abdomen was deflated. The supraumbilical port was removed. The skin was closed with 4-0 monocryl in a subcuticular fashion. All incisions were covered with Steri-Strips and small bandages.     Jennifer Godinez MD   OBGYN, PGY-2    Petr Foley MD

## 2019-12-26 NOTE — OR NURSING
Veronique Hussain Dani has met all discharge criteria from Phase II. Vital Signs are stable, ambulating  without difficulty. Discharge instructions given, patient verbalized understanding. Discharged from facility via wheelchair in stable condition.

## 2019-12-26 NOTE — INTERVAL H&P NOTE
The patient has been examined and the H&P has been reviewed:    I concur with the findings and no changes have occurred since H&P was written.    Surgery risks, benefits and alternative options discussed and understood by patient/family.          Active Hospital Problems    Diagnosis  POA    Tubal ligation evaluation [Z01.818]  Not Applicable      Resolved Hospital Problems   No resolved problems to display.     Petr Foley MD

## 2019-12-26 NOTE — TRANSFER OF CARE
"Anesthesia Transfer of Care Note    Patient: Veronique Louise    Procedure(s) Performed: Procedure(s) (LRB):  SALPINGECTOMY, LAPAROSCOPIC (Bilateral)    Patient location: PACU    Anesthesia Type: general    Transport from OR: Transported from OR on room air with adequate spontaneous ventilation    Post pain: adequate analgesia    Post assessment: no apparent anesthetic complications    Post vital signs: stable    Level of consciousness: awake    Nausea/Vomiting: no nausea/vomiting    Complications: none    Transfer of care protocol was followed      Last vitals:   Visit Vitals  BP (!) 143/84 (BP Location: Right arm, Patient Position: Lying)   Pulse 82   Temp 36.7 °C (98.1 °F) (Oral)   Resp 18   Ht 5' 4" (1.626 m)   Wt 100.5 kg (221 lb 9 oz)   LMP 11/27/2019   SpO2 96%   Breastfeeding? No   BMI 38.03 kg/m²     "

## 2020-01-06 LAB
FINAL PATHOLOGIC DIAGNOSIS: NORMAL
GROSS: NORMAL

## 2020-01-10 ENCOUNTER — OFFICE VISIT (OUTPATIENT)
Dept: OBSTETRICS AND GYNECOLOGY | Facility: CLINIC | Age: 40
End: 2020-01-10
Payer: COMMERCIAL

## 2020-01-10 VITALS
BODY MASS INDEX: 37.52 KG/M2 | HEIGHT: 64 IN | DIASTOLIC BLOOD PRESSURE: 76 MMHG | SYSTOLIC BLOOD PRESSURE: 114 MMHG | WEIGHT: 219.81 LBS

## 2020-01-10 DIAGNOSIS — Z98.890 POST-OPERATIVE STATE: Primary | ICD-10-CM

## 2020-01-10 PROCEDURE — 99999 PR PBB SHADOW E&M-EST. PATIENT-LVL III: ICD-10-PCS | Mod: PBBFAC,,, | Performed by: OBSTETRICS & GYNECOLOGY

## 2020-01-10 PROCEDURE — 99999 PR PBB SHADOW E&M-EST. PATIENT-LVL III: CPT | Mod: PBBFAC,,, | Performed by: OBSTETRICS & GYNECOLOGY

## 2020-01-10 PROCEDURE — 99024 POSTOP FOLLOW-UP VISIT: CPT | Mod: S$GLB,,, | Performed by: OBSTETRICS & GYNECOLOGY

## 2020-01-10 PROCEDURE — 99024 PR POST-OP FOLLOW-UP VISIT: ICD-10-PCS | Mod: S$GLB,,, | Performed by: OBSTETRICS & GYNECOLOGY

## 2020-01-10 RX ORDER — LURASIDONE HYDROCHLORIDE 40 MG/1
TABLET, FILM COATED ORAL
COMMUNITY
Start: 2019-12-11 | End: 2022-08-15

## 2020-01-13 NOTE — PROGRESS NOTES
PT HERE S/P BS. NO PROBLEM.    PE:  General Appearance: obese And Well developed. Well nourished. In no acute distress.  Abdomen: obese No masses. No tenderness.  HEALED INCISIONS    PROCEDURES:    PLAN:     DIAGNOSIS:  1. Post-operative state        MEDICATIONS & ORDERS:       Patient was counseled today on the new ACS guidelines for cervical cytology screening as well as the current recommendations for breast cancer screening. She was counseled to follow up with her PCP for other routine health maintenance. Counseling session lasted approximately 10 minutes, and all her questions were answered.         FOLLOW-UP: With me PRN

## 2020-01-16 ENCOUNTER — PATIENT MESSAGE (OUTPATIENT)
Dept: OBSTETRICS AND GYNECOLOGY | Facility: CLINIC | Age: 40
End: 2020-01-16

## 2021-04-26 ENCOUNTER — PATIENT MESSAGE (OUTPATIENT)
Dept: RESEARCH | Facility: HOSPITAL | Age: 41
End: 2021-04-26

## 2022-07-26 ENCOUNTER — OFFICE VISIT (OUTPATIENT)
Dept: OBSTETRICS AND GYNECOLOGY | Facility: CLINIC | Age: 42
End: 2022-07-26
Payer: COMMERCIAL

## 2022-07-26 ENCOUNTER — LAB VISIT (OUTPATIENT)
Dept: LAB | Facility: OTHER | Age: 42
End: 2022-07-26
Payer: COMMERCIAL

## 2022-07-26 VITALS
BODY MASS INDEX: 32.01 KG/M2 | HEIGHT: 64 IN | DIASTOLIC BLOOD PRESSURE: 72 MMHG | WEIGHT: 187.5 LBS | SYSTOLIC BLOOD PRESSURE: 116 MMHG

## 2022-07-26 DIAGNOSIS — Z01.419 ENCOUNTER FOR WELL WOMAN EXAM WITH ROUTINE GYNECOLOGICAL EXAM: Primary | ICD-10-CM

## 2022-07-26 DIAGNOSIS — Z80.0 FAMILY HISTORY OF COLON CANCER: ICD-10-CM

## 2022-07-26 DIAGNOSIS — Z11.3 ROUTINE SCREENING FOR STI (SEXUALLY TRANSMITTED INFECTION): ICD-10-CM

## 2022-07-26 DIAGNOSIS — E05.90 SUBCLINICAL HYPERTHYROIDISM: ICD-10-CM

## 2022-07-26 DIAGNOSIS — A60.9 HSV (HERPES SIMPLEX VIRUS) ANOGENITAL INFECTION: ICD-10-CM

## 2022-07-26 DIAGNOSIS — Z01.419 ENCOUNTER FOR WELL WOMAN EXAM WITH ROUTINE GYNECOLOGICAL EXAM: ICD-10-CM

## 2022-07-26 LAB
ALBUMIN SERPL BCP-MCNC: 3.9 G/DL (ref 3.5–5.2)
ALP SERPL-CCNC: 44 U/L (ref 55–135)
ALT SERPL W/O P-5'-P-CCNC: 10 U/L (ref 10–44)
ANION GAP SERPL CALC-SCNC: 11 MMOL/L (ref 8–16)
AST SERPL-CCNC: 12 U/L (ref 10–40)
BASOPHILS # BLD AUTO: 0.02 K/UL (ref 0–0.2)
BASOPHILS NFR BLD: 0.3 % (ref 0–1.9)
BILIRUB SERPL-MCNC: 0.3 MG/DL (ref 0.1–1)
BUN SERPL-MCNC: 6 MG/DL (ref 6–20)
C TRACH DNA SPEC QL NAA+PROBE: NOT DETECTED
CALCIUM SERPL-MCNC: 9.3 MG/DL (ref 8.7–10.5)
CHLORIDE SERPL-SCNC: 103 MMOL/L (ref 95–110)
CHOLEST SERPL-MCNC: 188 MG/DL (ref 120–199)
CHOLEST/HDLC SERPL: 2.9 {RATIO} (ref 2–5)
CO2 SERPL-SCNC: 26 MMOL/L (ref 23–29)
CREAT SERPL-MCNC: 0.7 MG/DL (ref 0.5–1.4)
DIFFERENTIAL METHOD: ABNORMAL
EOSINOPHIL # BLD AUTO: 0 K/UL (ref 0–0.5)
EOSINOPHIL NFR BLD: 0.5 % (ref 0–8)
ERYTHROCYTE [DISTWIDTH] IN BLOOD BY AUTOMATED COUNT: 12.7 % (ref 11.5–14.5)
EST. GFR  (AFRICAN AMERICAN): >60 ML/MIN/1.73 M^2
EST. GFR  (NON AFRICAN AMERICAN): >60 ML/MIN/1.73 M^2
ESTIMATED AVG GLUCOSE: 105 MG/DL (ref 68–131)
GLUCOSE SERPL-MCNC: 96 MG/DL (ref 70–110)
HBA1C MFR BLD: 5.3 % (ref 4–5.6)
HCT VFR BLD AUTO: 36.3 % (ref 37–48.5)
HDLC SERPL-MCNC: 64 MG/DL (ref 40–75)
HDLC SERPL: 34 % (ref 20–50)
HGB BLD-MCNC: 12.1 G/DL (ref 12–16)
IMM GRANULOCYTES # BLD AUTO: 0.01 K/UL (ref 0–0.04)
IMM GRANULOCYTES NFR BLD AUTO: 0.1 % (ref 0–0.5)
LDLC SERPL CALC-MCNC: 110.8 MG/DL (ref 63–159)
LYMPHOCYTES # BLD AUTO: 2.6 K/UL (ref 1–4.8)
LYMPHOCYTES NFR BLD: 33.7 % (ref 18–48)
MCH RBC QN AUTO: 30.3 PG (ref 27–31)
MCHC RBC AUTO-ENTMCNC: 33.3 G/DL (ref 32–36)
MCV RBC AUTO: 91 FL (ref 82–98)
MONOCYTES # BLD AUTO: 0.6 K/UL (ref 0.3–1)
MONOCYTES NFR BLD: 7.5 % (ref 4–15)
N GONORRHOEA DNA SPEC QL NAA+PROBE: NOT DETECTED
NEUTROPHILS # BLD AUTO: 4.4 K/UL (ref 1.8–7.7)
NEUTROPHILS NFR BLD: 57.9 % (ref 38–73)
NONHDLC SERPL-MCNC: 124 MG/DL
NRBC BLD-RTO: 0 /100 WBC
PLATELET # BLD AUTO: 292 K/UL (ref 150–450)
PMV BLD AUTO: 10.1 FL (ref 9.2–12.9)
POTASSIUM SERPL-SCNC: 3.5 MMOL/L (ref 3.5–5.1)
PROT SERPL-MCNC: 7.7 G/DL (ref 6–8.4)
RBC # BLD AUTO: 4 M/UL (ref 4–5.4)
SODIUM SERPL-SCNC: 140 MMOL/L (ref 136–145)
T4 FREE SERPL-MCNC: 1.08 NG/DL (ref 0.71–1.51)
TRIGL SERPL-MCNC: 66 MG/DL (ref 30–150)
TSH SERPL DL<=0.005 MIU/L-ACNC: 0.1 UIU/ML (ref 0.4–4)
WBC # BLD AUTO: 7.56 K/UL (ref 3.9–12.7)

## 2022-07-26 PROCEDURE — 99396 PREV VISIT EST AGE 40-64: CPT | Mod: S$GLB,,, | Performed by: ADVANCED PRACTICE MIDWIFE

## 2022-07-26 PROCEDURE — 87591 N.GONORRHOEAE DNA AMP PROB: CPT | Mod: 59

## 2022-07-26 PROCEDURE — 3074F SYST BP LT 130 MM HG: CPT | Mod: CPTII,S$GLB,, | Performed by: ADVANCED PRACTICE MIDWIFE

## 2022-07-26 PROCEDURE — 80061 LIPID PANEL: CPT

## 2022-07-26 PROCEDURE — 87491 CHLMYD TRACH DNA AMP PROBE: CPT | Mod: 59

## 2022-07-26 PROCEDURE — 1159F PR MEDICATION LIST DOCUMENTED IN MEDICAL RECORD: ICD-10-PCS | Mod: CPTII,S$GLB,, | Performed by: ADVANCED PRACTICE MIDWIFE

## 2022-07-26 PROCEDURE — 85025 COMPLETE CBC W/AUTO DIFF WBC: CPT

## 2022-07-26 PROCEDURE — 36415 COLL VENOUS BLD VENIPUNCTURE: CPT

## 2022-07-26 PROCEDURE — 3008F BODY MASS INDEX DOCD: CPT | Mod: CPTII,S$GLB,, | Performed by: ADVANCED PRACTICE MIDWIFE

## 2022-07-26 PROCEDURE — 3074F PR MOST RECENT SYSTOLIC BLOOD PRESSURE < 130 MM HG: ICD-10-PCS | Mod: CPTII,S$GLB,, | Performed by: ADVANCED PRACTICE MIDWIFE

## 2022-07-26 PROCEDURE — 1159F MED LIST DOCD IN RCRD: CPT | Mod: CPTII,S$GLB,, | Performed by: ADVANCED PRACTICE MIDWIFE

## 2022-07-26 PROCEDURE — 83036 HEMOGLOBIN GLYCOSYLATED A1C: CPT

## 2022-07-26 PROCEDURE — 3044F PR MOST RECENT HEMOGLOBIN A1C LEVEL <7.0%: ICD-10-PCS | Mod: CPTII,S$GLB,, | Performed by: ADVANCED PRACTICE MIDWIFE

## 2022-07-26 PROCEDURE — 3078F DIAST BP <80 MM HG: CPT | Mod: CPTII,S$GLB,, | Performed by: ADVANCED PRACTICE MIDWIFE

## 2022-07-26 PROCEDURE — 80074 ACUTE HEPATITIS PANEL: CPT

## 2022-07-26 PROCEDURE — 3044F HG A1C LEVEL LT 7.0%: CPT | Mod: CPTII,S$GLB,, | Performed by: ADVANCED PRACTICE MIDWIFE

## 2022-07-26 PROCEDURE — 87481 CANDIDA DNA AMP PROBE: CPT | Mod: 59

## 2022-07-26 PROCEDURE — 80053 COMPREHEN METABOLIC PANEL: CPT

## 2022-07-26 PROCEDURE — 87801 DETECT AGNT MULT DNA AMPLI: CPT

## 2022-07-26 PROCEDURE — 99999 PR PBB SHADOW E&M-EST. PATIENT-LVL III: ICD-10-PCS | Mod: PBBFAC,,, | Performed by: ADVANCED PRACTICE MIDWIFE

## 2022-07-26 PROCEDURE — 99999 PR PBB SHADOW E&M-EST. PATIENT-LVL III: CPT | Mod: PBBFAC,,, | Performed by: ADVANCED PRACTICE MIDWIFE

## 2022-07-26 PROCEDURE — 3008F PR BODY MASS INDEX (BMI) DOCUMENTED: ICD-10-PCS | Mod: CPTII,S$GLB,, | Performed by: ADVANCED PRACTICE MIDWIFE

## 2022-07-26 PROCEDURE — 3078F PR MOST RECENT DIASTOLIC BLOOD PRESSURE < 80 MM HG: ICD-10-PCS | Mod: CPTII,S$GLB,, | Performed by: ADVANCED PRACTICE MIDWIFE

## 2022-07-26 PROCEDURE — 86592 SYPHILIS TEST NON-TREP QUAL: CPT

## 2022-07-26 PROCEDURE — 84443 ASSAY THYROID STIM HORMONE: CPT

## 2022-07-26 PROCEDURE — 84439 ASSAY OF FREE THYROXINE: CPT

## 2022-07-26 PROCEDURE — 87389 HIV-1 AG W/HIV-1&-2 AB AG IA: CPT

## 2022-07-26 PROCEDURE — 99396 PR PREVENTIVE VISIT,EST,40-64: ICD-10-PCS | Mod: S$GLB,,, | Performed by: ADVANCED PRACTICE MIDWIFE

## 2022-07-26 NOTE — PROGRESS NOTES
Veronique Louise is a 42 y.o.  who presents today for her annual GYN exam.    C/C: annual GYN well woman preventative exam    HPI: She is currently sexually active and had a bilateral tubal ligation for contraception. Denies dyspareunia.   Has GYN related concerns - low libido. Desires STD testing. Reports Generalized Anxiety Disorder, Bipolar II, Panic Attack Disorder, Subclinical Hyperthyroid     History of uterine perforation with IUD removal    MENSTRUAL HISTORY  Patient's last menstrual period was 2022 (exact date). She reports regular menses occurring every 21-24 days. Menses last 5-7 days with sometimes heavy flow, sometimes moderate to light flow. She does have dysmenorrhea at the beginning of her menstrual period. She denies intermenstrual bleeding.    PAP HISTORY: last pap 21, result neg with neg HPV per patient, denies any history of abnormal pap smear or STDs. Pap before was 2018, neg with neg HPV.     IMMUNIZATIONS: Desires Gardisil    SOCIAL HISTORY: Denies emotional/mental/physical/sexual violence or abuse. Feels safe at home.    PSYCH HISTORY: Seeing prescribing psychologist for therapy and medication management. Going well.     Review of patient's allergies indicates:   Allergen Reactions    Percocet [oxycodone-acetaminophen] Nausea And Vomiting     Causes extreme N/V. OK to take Hydrocodone.      Past Medical History:   Diagnosis Date    Anemia     Breast disorder     Abscess post childbirth    Diabetes mellitus     Gestational with last child    HSV infection     Subclinical hyperthyroidism 2016     Past Surgical History:   Procedure Laterality Date    EXPLORATORY LAPAROTOMY      AFTER UTERINE PERF    HYSTEROSCOPY      IUD REMOVAL WITH PERF    INCISION AND DRAINAGE OF BREAST Left 2015    INTRAUTERINE DEVICE INSERTION  2011    MIRENA / PARAGAURD     LAPAROSCOPIC SALPINGECTOMY Bilateral 2019    Procedure: SALPINGECTOMY, LAPAROSCOPIC;  Surgeon: Petr  CESARIO Foley Jr., MD;  Location: [facility];  Service: OB/GYN;  Laterality: Bilateral;    TONSILLECTOMY, ADENOIDECTOMY      TUBAL LIGATION       Past Surgical History:   Procedure Laterality Date    EXPLORATORY LAPAROTOMY      AFTER UTERINE PERF    HYSTEROSCOPY      IUD REMOVAL WITH PERF    INCISION AND DRAINAGE OF BREAST Left 2015    INTRAUTERINE DEVICE INSERTION      MIRENA / PARAGAURD     LAPAROSCOPIC SALPINGECTOMY Bilateral 2019    Procedure: SALPINGECTOMY, LAPAROSCOPIC;  Surgeon: Petr Foley Jr., MD;  Location: [facility] OR;  Service: OB/GYN;  Laterality: Bilateral;    TONSILLECTOMY, ADENOIDECTOMY      TUBAL LIGATION       OB History    Para Term  AB Living   3 3 3     2   SAB IAB Ectopic Multiple Live Births   0 0   0 2      # Outcome Date GA Lbr Rafael/2nd Weight Sex Delivery Anes PTL Lv   3 Term 04/04/15 39w6d / 00:06 3.86 kg (8 lb 8.2 oz) F Vag-Spont None N BRIGHT   2 Term 13 40w0d  3.402 kg (7 lb 8 oz) F Vag-Spont None N       Birth Comments:    1 Term 10/22/09 40w0d  3.374 kg (7 lb 7 oz) F Vag-Spont None N BRIGHT     OB History        3    Para   3    Term   3            AB        Living   2       SAB   0    IAB   0    Ectopic        Multiple   0    Live Births   2               Social History     Socioeconomic History    Marital status:      Spouse name: Hayden [employer]   Occupational History    Occupation: [employer]     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    Drug use: No    Sexual activity: Yes     Partners: Male     Birth control/protection: See Surgical Hx     Comment: more than one partner   Social History Narrative    Pt: [employer], grew up in [place].    : Hayden [employer] in [place] [employer], grew up rurally.    Pt's last child, Luz Marina, , choked  "on grape 2014. Pt does not wish to review this / visit grief at PNC visits.     Family History   Problem Relation Age of Onset    Diabetes Mother         Type II    Hypertension Mother     Colon cancer Father         dx at 62yo, past at 63yo [id number]    Hypertension Father     Hypertension Sister     Heart attack Maternal Grandfather     Colon cancer Paternal Grandmother     Colon cancer Paternal Grandfather         dx in 80s,  year later    Breast cancer Neg Hx     Ovarian cancer Neg Hx      Social History     Substance and Sexual Activity   Sexual Activity Yes    Partners: Male    Birth control/protection: See Surgical Hx    Comment: more than one partner       Joanna Montoya MD     ROS  Constitutional/Gen: Negative--fever, weight change, fatigue   Skin:  Negative--Hirsutism, acne, rash  CV/vasc: Negative--chest pain, palpitations, syncope  Resp:  Negative--Cough, shortness of breath, wheezing  GI:  Negative--Nausea, vomiting, diarrhea, constipation, abdominal pain  :  Negative--Dysuria, vaginal discharge, genital sores, dyspareunia   Lymph:  Negative--Swollen glands, frequent infection  Heme:  Negative--Easy bruising or bleeding, clot  Breast: Negative--Mass, lump, nipple discharge, tenderness  MS:  Negative--Back/joint pain, muscle weakness, difficulty walking  Neuro: Negative--Numbness, tingling, confusion, headaches, seizures, dizziness  Psych: Negative--Depressed mood, anxiety, insomnia  Endocrine:  Negative--Polydipsia, polyuria, heat or cold intolerance    OBJECTIVE:  /72   Ht 5' 4" (1.626 m)   Wt 85.1 kg (187 lb 8 oz)   LMP 2022 (Exact Date)   BMI 32.18 kg/m²   Gen:  NAD, appears stated age, well groomed  Skin: warm and dry w/o rash  Head: normocephalic  Neck: supple, no masses or enlargement  Lung: normal resp effort, CTAB  Heart: normal HR, RRR   Breast: bilaterally--no masses, tenderness, skin changes, or nipple discharge noted  Abdomen: soft, nontender, no " massesExternal genitalia: no lesions or discharge, normal hair distribution  Urethral meatus: normal size and location, no lesions or prolapse  Vagina: normal appearance, no lesions, no discharge, no evidence cystocele or rectocele.  Cervix: normal appearance, no discharge, no lesions, negative CMT  Uterus: nontender, mobile, normal size, contour, and position.   Adnexa: no masses or tenderness  Anus: normal appearance, with no lesions or discharge. Internal exam deferred.  Extremities: FROM, with no edema or tenderness.  Neurologic: A&O x 4, non-focal, cranial nerves 2-12 grossly intact  Psych:  appropriate affect and no signs of mood, thought or memory difficulty appreciated      ASSESSMENT/PLAN:   42 y.o. female  for GYN annual well woman exam  -- Discussed ASCCP pap guidelines. Last pap 2021 result NILM with neg HPV; next pap due no later than 2026  -- STD testing: GC/CT, Affirm, Hep B, Hep C, HIV, RPR  -- Health Teaching:  Discussed appropriate weight and nutrition. Emphasized importance of calcium and vitamin D intake. Encouraged exercise, 30-40 min 3x a week or more. BSE and health maintenance reviewed. Discussed following PCP for annual health screening. Discussed BMI and normal ranges. Discussed exercise to improve libido.  --Labs CBC, CMP, TSH, A1C, Lipid panel  --Mammogram ordered  --Gardasil ordered   --Valtrex prescription updated and sent to pharmacy for episodic therapy  --Continue annual exams, return in 1 year or sooner prn    Fam hx of colon CA  -- Referral to GI placed for colonoscopy    Subclinical hyperthyroid  --Patient to follow up with endocrinology    Bipolar II, Generalized Anxiety Disorder, Panic Attack Disorder  --Patient to continue seeing psychologist for therapy and medication management      Updated Medication List:  Current Outpatient Medications   Medication Sig Dispense Refill    busPIRone (BUSPAR) 10 MG tablet Take 1 tablet (10 mg) by mouth 2 times per day 180 tablet 3     LATUDA 40 mg Tab tablet       lisdexamfetamine (VYVANSE) 30 MG capsule Take 1 capsule (30 mg) by mouth daily in the morning 30 capsule 0    lurasidone (LATUDA) 40 mg Tab tablet Take 1 tablet (40 mg) by mouth daily with food 90 tablet 3    traZODone (DESYREL) 50 MG tablet Take 1 tablet (50 mg) by mouth daily at bedtime as needed 90 tablet 3    valACYclovir (VALTREX) 1000 MG tablet Take 1 tablet (1,000 mg total) by mouth 2 (two) times daily. 60 tablet 3     No current facility-administered medications for this visit.        53 minutes of total time spent on the encounter, which includes face to face time and non-face to face time preparing to see the patient (eg, review of tests), Obtaining and/or reviewing separately obtained history, Documenting clinical information in the electronic or other health record, Independently interpreting results (not separately reported) and communicating results to the patient/family/caregiver, or Care coordination (not separately reported). Greater than 50% of this time was spent as face-to-face time with the patient.    Terri Pugh CNM

## 2022-07-27 LAB — RPR SER QL: NORMAL

## 2022-07-27 RX ORDER — VALACYCLOVIR HYDROCHLORIDE 1 G/1
1000 TABLET, FILM COATED ORAL DAILY
Qty: 20 TABLET | Refills: 0 | Status: SHIPPED | OUTPATIENT
Start: 2022-07-27 | End: 2023-10-02 | Stop reason: SDUPTHER

## 2022-07-28 ENCOUNTER — PATIENT MESSAGE (OUTPATIENT)
Dept: OBSTETRICS AND GYNECOLOGY | Facility: CLINIC | Age: 42
End: 2022-07-28
Payer: COMMERCIAL

## 2022-07-28 LAB
BACTERIAL VAGINOSIS DNA: NEGATIVE
CANDIDA GLABRATA DNA: NEGATIVE
CANDIDA KRUSEI DNA: NEGATIVE
CANDIDA RRNA VAG QL PROBE: POSITIVE
HAV IGM SERPL QL IA: NEGATIVE
HBV CORE IGM SERPL QL IA: NEGATIVE
HBV SURFACE AG SERPL QL IA: NEGATIVE
HCV AB SERPL QL IA: NEGATIVE
HIV 1+2 AB+HIV1 P24 AG SERPL QL IA: NEGATIVE
T VAGINALIS RRNA GENITAL QL PROBE: NEGATIVE

## 2022-07-29 DIAGNOSIS — B37.31 CANDIDA VAGINITIS: Primary | ICD-10-CM

## 2022-07-29 RX ORDER — FLUCONAZOLE 150 MG/1
150 TABLET ORAL DAILY
Qty: 1 TABLET | Refills: 0 | Status: SHIPPED | OUTPATIENT
Start: 2022-07-29 | End: 2022-08-02

## 2022-09-02 ENCOUNTER — HOSPITAL ENCOUNTER (OUTPATIENT)
Dept: RADIOLOGY | Facility: OTHER | Age: 42
Discharge: HOME | End: 2022-09-02
Payer: COMMERCIAL

## 2022-09-02 DIAGNOSIS — Z12.31 VISIT FOR SCREENING MAMMOGRAM: ICD-10-CM

## 2022-09-02 DIAGNOSIS — Z01.419 ENCOUNTER FOR WELL WOMAN EXAM WITH ROUTINE GYNECOLOGICAL EXAM: ICD-10-CM

## 2022-09-02 PROCEDURE — 77067 SCR MAMMO BI INCL CAD: CPT | Mod: 26,,, | Performed by: INTERNAL MEDICINE

## 2022-09-02 PROCEDURE — 77063 BREAST TOMOSYNTHESIS BI: CPT | Mod: TC

## 2022-09-02 PROCEDURE — 77063 MAMMO DIGITAL SCREENING BILAT WITH TOMO: ICD-10-PCS | Mod: 26,,, | Performed by: INTERNAL MEDICINE

## 2022-09-02 PROCEDURE — 77067 MAMMO DIGITAL SCREENING BILAT WITH TOMO: ICD-10-PCS | Mod: 26,,, | Performed by: INTERNAL MEDICINE

## 2022-09-02 PROCEDURE — 77063 BREAST TOMOSYNTHESIS BI: CPT | Mod: 26,,, | Performed by: INTERNAL MEDICINE

## 2022-10-03 ENCOUNTER — OFFICE VISIT (OUTPATIENT)
Dept: GASTROENTEROLOGY | Facility: CLINIC | Age: 42
End: 2022-10-03
Payer: COMMERCIAL

## 2022-10-03 VITALS
DIASTOLIC BLOOD PRESSURE: 91 MMHG | HEART RATE: 70 BPM | SYSTOLIC BLOOD PRESSURE: 130 MMHG | WEIGHT: 187 LBS | BODY MASS INDEX: 31.92 KG/M2 | HEIGHT: 64 IN

## 2022-10-03 DIAGNOSIS — Z80.0 FAMILY HISTORY OF COLON CANCER: Primary | ICD-10-CM

## 2022-10-03 DIAGNOSIS — Z01.419 ENCOUNTER FOR WELL WOMAN EXAM WITH ROUTINE GYNECOLOGICAL EXAM: ICD-10-CM

## 2022-10-03 PROCEDURE — 3075F PR MOST RECENT SYSTOLIC BLOOD PRESS GE 130-139MM HG: ICD-10-PCS | Mod: CPTII,S$GLB,, | Performed by: INTERNAL MEDICINE

## 2022-10-03 PROCEDURE — 3080F PR MOST RECENT DIASTOLIC BLOOD PRESSURE >= 90 MM HG: ICD-10-PCS | Mod: CPTII,S$GLB,, | Performed by: INTERNAL MEDICINE

## 2022-10-03 PROCEDURE — 1159F MED LIST DOCD IN RCRD: CPT | Mod: CPTII,S$GLB,, | Performed by: INTERNAL MEDICINE

## 2022-10-03 PROCEDURE — 1159F PR MEDICATION LIST DOCUMENTED IN MEDICAL RECORD: ICD-10-PCS | Mod: CPTII,S$GLB,, | Performed by: INTERNAL MEDICINE

## 2022-10-03 PROCEDURE — 3075F SYST BP GE 130 - 139MM HG: CPT | Mod: CPTII,S$GLB,, | Performed by: INTERNAL MEDICINE

## 2022-10-03 PROCEDURE — 99999 PR PBB SHADOW E&M-EST. PATIENT-LVL III: ICD-10-PCS | Mod: PBBFAC,,, | Performed by: INTERNAL MEDICINE

## 2022-10-03 PROCEDURE — 99499 NO LOS: ICD-10-PCS | Mod: S$GLB,,, | Performed by: INTERNAL MEDICINE

## 2022-10-03 PROCEDURE — 3044F HG A1C LEVEL LT 7.0%: CPT | Mod: CPTII,S$GLB,, | Performed by: INTERNAL MEDICINE

## 2022-10-03 PROCEDURE — 3008F BODY MASS INDEX DOCD: CPT | Mod: CPTII,S$GLB,, | Performed by: INTERNAL MEDICINE

## 2022-10-03 PROCEDURE — 99499 UNLISTED E&M SERVICE: CPT | Mod: S$GLB,,, | Performed by: INTERNAL MEDICINE

## 2022-10-03 PROCEDURE — 3008F PR BODY MASS INDEX (BMI) DOCUMENTED: ICD-10-PCS | Mod: CPTII,S$GLB,, | Performed by: INTERNAL MEDICINE

## 2022-10-03 PROCEDURE — 99999 PR PBB SHADOW E&M-EST. PATIENT-LVL III: CPT | Mod: PBBFAC,,, | Performed by: INTERNAL MEDICINE

## 2022-10-03 PROCEDURE — 3044F PR MOST RECENT HEMOGLOBIN A1C LEVEL <7.0%: ICD-10-PCS | Mod: CPTII,S$GLB,, | Performed by: INTERNAL MEDICINE

## 2022-10-03 PROCEDURE — 3080F DIAST BP >= 90 MM HG: CPT | Mod: CPTII,S$GLB,, | Performed by: INTERNAL MEDICINE

## 2022-10-03 RX ORDER — CLONAZEPAM 0.5 MG/1
0.5 TABLET ORAL 2 TIMES DAILY PRN
Status: ON HOLD | COMMUNITY
End: 2022-11-30 | Stop reason: HOSPADM

## 2022-10-03 RX ORDER — SOD SULF/POT CHLORIDE/MAG SULF 1.479 G
TABLET ORAL
Status: ON HOLD | COMMUNITY
Start: 2022-04-15 | End: 2022-11-30 | Stop reason: HOSPADM

## 2022-10-03 RX ORDER — NORETHINDRONE 5 MG/1
5 TABLET ORAL 3 TIMES DAILY
COMMUNITY
Start: 2022-09-07

## 2022-10-03 RX ORDER — METHIMAZOLE 5 MG/1
5 TABLET ORAL
COMMUNITY

## 2022-10-03 NOTE — PROGRESS NOTES
Reason for visit: Family with CRC    HPI:  is a 42 year old lady with her father diagnosed with colon cancer at age 63 yrs. Both paternal grandparents with CRC. Medical history includes Generalized Anxiety Disorder, Bipolar II, Panic Attack Disorder and Subclinical Hyperthyroidism. Denies any dysphagia, odynophagia, nausea, vomiting, heartburn or acid regurgitation. No abdominal pains, changes in bowel pattern, blood/ mucus in stool or unintentional weight loss. No melena or maroon stools. No recent changes in diet or medications. No family history of IBD or Celiac disease. No regular NSAIDs (rare), alcohol (social) or tobacco use (none). No recent antibiotic use, travels or sick contacts. No prior history of C.diff.    Past medical, surgical, social and family history reviewed in epic    Medication allergies reviewed in epic    Review of systems:    Constitutional:  No fever, no chills, no weight loss, appetite is normal  Eyes:  No visual changes or red eyes  ENT:  No odynophagia or hoarseness of voice  Cardiovascular:  No angina or palpitation  Respiratory:  No shortness breath or wheezing  Genitourinary:  No dysuria or frequency  Musculoskeletal:  No myalgias or arthralgias  Skin:  No pruritus or eczema  Neurologic:  No headache or seizures  Psychiatric:  No anxiety depression  Gastrointestinal:  See HPI    Physical exam:  Vitals see epic, awake, alert, oriented x3 in no acute distress    Neck:  Supple, no carotid bruit, no cervical adenopathy  Abdomen:  Flat, soft, nontender, nondistended, no masses palpable, no hepatosplenomegaly detected, bowel sounds are normal, no ascites clinically detectable  Eyes:  Conjunctivae anicteric, not injected  ENT:  Oral mucosa moist  Cardiovascular:  S1, S2 normal, no murmurs, no gallops, no abdominal bruits heard  Respiratory:  Bilateral air entry equal, no rhonchi, no crackles, normal effort  Skin:  No palmar erythema or spider angiomata  Neurologic:  No asterixis or  tremors  Psychiatric:  Affect appropriate, proper judgment, proper insight, oriented to place and time  Lower extremities:  No pedal edema    Recent lab reports reviewed.      Impression: First degree relative with CRC    Recommendations:   Schedule Colonoscopy with 5 year screening interval.

## 2022-10-26 ENCOUNTER — CLINICAL SUPPORT (OUTPATIENT)
Dept: ENDOSCOPY | Facility: HOSPITAL | Age: 42
End: 2022-10-26
Attending: INTERNAL MEDICINE
Payer: COMMERCIAL

## 2022-10-26 VITALS — HEIGHT: 64 IN | BODY MASS INDEX: 31.92 KG/M2 | WEIGHT: 187 LBS

## 2022-10-26 DIAGNOSIS — Z12.11 SPECIAL SCREENING FOR MALIGNANT NEOPLASMS, COLON: Primary | ICD-10-CM

## 2022-10-26 DIAGNOSIS — Z80.0 FAMILY HISTORY OF COLON CANCER: ICD-10-CM

## 2022-10-26 RX ORDER — SOD SULF/POT CHLORIDE/MAG SULF 1.479 G
12 TABLET ORAL DAILY
Qty: 24 TABLET | Refills: 0 | Status: ON HOLD | OUTPATIENT
Start: 2022-10-26 | End: 2022-11-30 | Stop reason: HOSPADM

## 2022-10-26 NOTE — PLAN OF CARE
Endoscopy procedure scheduled on 11/30/22, prep instructions reviewed, Pt verbalized understanding.

## 2022-11-30 ENCOUNTER — HOSPITAL ENCOUNTER (OUTPATIENT)
Facility: HOSPITAL | Age: 42
Discharge: HOME | End: 2022-11-30
Attending: INTERNAL MEDICINE | Admitting: INTERNAL MEDICINE
Payer: COMMERCIAL

## 2022-11-30 ENCOUNTER — ANESTHESIA (OUTPATIENT)
Dept: ENDOSCOPY | Facility: HOSPITAL | Age: 42
End: 2022-11-30
Payer: COMMERCIAL

## 2022-11-30 ENCOUNTER — ANESTHESIA EVENT (OUTPATIENT)
Dept: ENDOSCOPY | Facility: HOSPITAL | Age: 42
End: 2022-11-30
Payer: COMMERCIAL

## 2022-11-30 VITALS
HEIGHT: 64 IN | OXYGEN SATURATION: 99 % | DIASTOLIC BLOOD PRESSURE: 78 MMHG | TEMPERATURE: 98 F | BODY MASS INDEX: 31.92 KG/M2 | WEIGHT: 187 LBS | HEART RATE: 51 BPM | RESPIRATION RATE: 16 BRPM | SYSTOLIC BLOOD PRESSURE: 120 MMHG

## 2022-11-30 DIAGNOSIS — Z80.0 FAMILY HISTORY OF COLON CANCER: Primary | ICD-10-CM

## 2022-11-30 LAB
B-HCG UR QL: NEGATIVE
CTP QC/QA: YES

## 2022-11-30 PROCEDURE — 37000008 HC ANESTHESIA 1ST 15 MINUTES: Performed by: INTERNAL MEDICINE

## 2022-11-30 PROCEDURE — G0105 COLORECTAL SCRN; HI RISK IND: HCPCS | Performed by: INTERNAL MEDICINE

## 2022-11-30 PROCEDURE — G0105 COLORECTAL SCRN; HI RISK IND: ICD-10-PCS | Mod: ,,, | Performed by: INTERNAL MEDICINE

## 2022-11-30 PROCEDURE — 37000009 HC ANESTHESIA EA ADD 15 MINS: Performed by: INTERNAL MEDICINE

## 2022-11-30 PROCEDURE — E9220 PRA ENDO ANESTHESIA: ICD-10-PCS | Mod: ,,, | Performed by: NURSE ANESTHETIST, CERTIFIED REGISTERED

## 2022-11-30 PROCEDURE — E9220 PRA ENDO ANESTHESIA: HCPCS | Mod: ,,, | Performed by: NURSE ANESTHETIST, CERTIFIED REGISTERED

## 2022-11-30 PROCEDURE — 63600175 PHARM REV CODE 636 W HCPCS: Performed by: NURSE ANESTHETIST, CERTIFIED REGISTERED

## 2022-11-30 PROCEDURE — 25000003 PHARM REV CODE 250: Performed by: NURSE ANESTHETIST, CERTIFIED REGISTERED

## 2022-11-30 PROCEDURE — 81025 URINE PREGNANCY TEST: CPT | Performed by: INTERNAL MEDICINE

## 2022-11-30 PROCEDURE — G0105 COLORECTAL SCRN; HI RISK IND: HCPCS | Mod: ,,, | Performed by: INTERNAL MEDICINE

## 2022-11-30 RX ORDER — PROPOFOL 10 MG/ML
VIAL (ML) INTRAVENOUS
Status: DISCONTINUED | OUTPATIENT
Start: 2022-11-30 | End: 2022-11-30

## 2022-11-30 RX ORDER — SODIUM CHLORIDE 9 MG/ML
INJECTION, SOLUTION INTRAVENOUS CONTINUOUS
Status: DISCONTINUED | OUTPATIENT
Start: 2022-11-30 | End: 2022-11-30 | Stop reason: HOSPADM

## 2022-11-30 RX ORDER — PROPOFOL 10 MG/ML
VIAL (ML) INTRAVENOUS CONTINUOUS PRN
Status: DISCONTINUED | OUTPATIENT
Start: 2022-11-30 | End: 2022-11-30

## 2022-11-30 RX ORDER — LIDOCAINE HYDROCHLORIDE 20 MG/ML
INJECTION, SOLUTION EPIDURAL; INFILTRATION; INTRACAUDAL; PERINEURAL
Status: DISCONTINUED | OUTPATIENT
Start: 2022-11-30 | End: 2022-11-30

## 2022-11-30 RX ADMIN — LIDOCAINE HYDROCHLORIDE 50 MG: 20 INJECTION, SOLUTION EPIDURAL; INFILTRATION; INTRACAUDAL; PERINEURAL at 10:11

## 2022-11-30 RX ADMIN — Medication 185 MCG/KG/MIN: at 10:11

## 2022-11-30 RX ADMIN — PROPOFOL 100 MG: 10 INJECTION, EMULSION INTRAVENOUS at 10:11

## 2022-11-30 RX ADMIN — SODIUM CHLORIDE: 0.9 INJECTION, SOLUTION INTRAVENOUS at 10:11

## 2022-11-30 NOTE — H&P
Short Stay Endoscopy History and Physical    PCP - Joanna Montoya MD    Procedure - EGD  Sedation: GA  ASA - per anesthesia  Mallampati - per anesthesia  History of Anesthesia problems - no  Family history Anesthesia problems -  no     HPI:  This is a 42 y.o. female here for evaluation of : Family history of Colon cancer    Reflux - no  Dysphagia - no  Abdominal pain - no  Diarrhea - no    ROS:  Constitutional: No fevers, chills, No weight loss  ENT: No allergies  CV: No chest pain  Pulm: No cough, No shortness of breath  Ophtho: No vision changes  GI: see HPI  Medical History:  has a past medical history of Anemia, Breast disorder, Diabetes mellitus, HSV infection, and Subclinical hyperthyroidism (12/19/2016).    Surgical History:  has a past surgical history that includes Intrauterine device insertion (2011); TONSILLECTOMY, ADENOIDECTOMY; Incision and drainage of breast (Left, 2015); Hysteroscopy; Exploratory laparotomy; Laparoscopic salpingectomy (Bilateral, 12/26/2019); and Tubal ligation.    Family History: family history includes Breast cancer in her paternal aunt; Colon cancer in her father, paternal grandfather, and paternal grandmother; Diabetes in her mother; Heart attack in her maternal grandfather; Hypertension in her father, mother, and sister.. Otherwise no colon cancer, inflammatory bowel disease, or GI malignancies.    Social History:  reports that she has never smoked. She has never used smokeless tobacco. She reports current alcohol use. She reports that she does not use drugs.    Review of patient's allergies indicates:   Allergen Reactions    Percocet [oxycodone-acetaminophen] Nausea And Vomiting     Causes extreme N/V. OK to take Hydrocodone.        Medications:   Medications Prior to Admission   Medication Sig Dispense Refill Last Dose    busPIRone (BUSPAR) 10 MG tablet Take 1 tablet (10 mg) by mouth 2 times per day 180 tablet 3     busPIRone (BUSPAR) 10 MG tablet Take 1 tablet (10 mg) by mouth 2  times per day 180 tablet 3     clonazePAM (KLONOPIN) 0.5 MG tablet Take 0.5 mg by mouth 2 (two) times daily as needed.       lisdexamfetamine (VYVANSE) 30 MG capsule Take 1 capsule (30 mg) by mouth daily in the morning 30 capsule 0     lurasidone (LATUDA) 40 mg Tab tablet Take 1 tablet (40 mg) by mouth daily with food 90 tablet 3     lurasidone (LATUDA) 40 mg Tab tablet Take 1 tablet (40 mg) by mouth daily with food 90 tablet 3     methIMAzole (TAPAZOLE) 5 MG Tab Take 5 mg by mouth.       norethindrone (AYGESTIN) 5 mg Tab Take 5 mg by mouth 3 (three) times daily.       sod sulf-pot chloride-mag sulf (SUTAB) 1.479-0.188- 0.225 gram tablet Take 12 tablets by mouth once daily. Take according to package instructions with indicated amount of water. 24 tablet 0     SUTAB 1.479-0.188- 0.225 gram tablet Take by mouth.       traZODone (DESYREL) 50 MG tablet Take 1 tablet (50 mg) by mouth daily at bedtime as needed 90 tablet 3     valACYclovir (VALTREX) 1000 MG tablet Take 1 tablet (1,000 mg total) by mouth once daily. Start immediately with onset of symptoms. for 5 days 20 tablet 0        Objective Findings:    Vital Signs: Per nursing notes.    Physical Exam:  General Appearance: Well appearing in no acute distress  Head:   Normocephalic, without obvious abnormality  Eyes:    No scleral icterus  Airway: Open  Neck: No restriction in mobility  Lungs: CTA bilaterally in anterior and posterior fields, no wheezes, no crackles.  Heart:  Regular rate and rhythm, S1, S2 normal, no murmurs heard  Abdomen: Soft, non tender, non distended      Labs:  Lab Results   Component Value Date    WBC 7.56 07/26/2022    HGB 12.1 07/26/2022    HCT 36.3 (L) 07/26/2022     07/26/2022    CHOL 188 07/26/2022    TRIG 66 07/26/2022    HDL 64 07/26/2022    ALT 10 07/26/2022    AST 12 07/26/2022     07/26/2022    K 3.5 07/26/2022     07/26/2022    CREATININE 0.7 07/26/2022    BUN 6 07/26/2022    CO2 26 07/26/2022    TSH 0.096 (L)  07/26/2022    HGBA1C 5.3 07/26/2022         I have explained the risks and benefits of endoscopy procedures to the patient including but not limited to bleeding, perforation, infection, and death.    Thank you so much for allowing me to participate in the care of Veronique Louise MD

## 2022-11-30 NOTE — ANESTHESIA POSTPROCEDURE EVALUATION
Anesthesia Post Evaluation    Patient: Veronique Louise    Procedure(s) Performed: Procedure(s) (LRB):  COLONOSCOPY (N/A)    Final Anesthesia Type: general      Patient location during evaluation: PACU  Patient participation: Yes- Able to Participate  Level of consciousness: awake and alert and oriented  Post-procedure vital signs: reviewed and stable  Pain management: adequate  Airway patency: patent    PONV status at discharge: No PONV  Anesthetic complications: no      Cardiovascular status: blood pressure returned to baseline and hemodynamically stable  Respiratory status: unassisted and spontaneous ventilation  Hydration status: euvolemic  Follow-up not needed.          Vitals Value Taken Time   /78 11/30/22 1122   Temp 36.7 °C (98 °F) 11/30/22 1054   Pulse 51 11/30/22 1122   Resp 16 11/30/22 1122   SpO2 99 % 11/30/22 1122         Event Time   Out of Recovery 11:23:34         Pain/Tulio Score: Tulio Score: 10 (11/30/2022 10:55 AM)

## 2022-11-30 NOTE — ANESTHESIA PREPROCEDURE EVALUATION
11/30/2022  Veronique Louise is a 42 y.o., female with a family hx of colon cancer here for screening.    Past Medical History:   Diagnosis Date    Anemia     Breast disorder     Abscess post childbirth    Diabetes mellitus     Gestational with last child    HSV infection     Subclinical hyperthyroidism 12/19/2016     Lab Results   Component Value Date    WBC 7.56 07/26/2022    HGB 12.1 07/26/2022    HCT 36.3 (L) 07/26/2022    MCV 91 07/26/2022     07/26/2022         Chemistry        Component Value Date/Time     07/26/2022 1256    K 3.5 07/26/2022 1256     07/26/2022 1256    CO2 26 07/26/2022 1256    BUN 6 07/26/2022 1256    CREATININE 0.7 07/26/2022 1256    GLU 96 07/26/2022 1256        Component Value Date/Time    CALCIUM 9.3 07/26/2022 1256    ALKPHOS 44 (L) 07/26/2022 1256    AST 12 07/26/2022 1256    ALT 10 07/26/2022 1256    BILITOT 0.3 07/26/2022 1256    ESTGFRAFRICA >60 07/26/2022 1256    EGFRNONAA >60 07/26/2022 1256              Pre-op Assessment    I have reviewed the Patient Summary Reports.     I have reviewed the Nursing Notes. I have reviewed the NPO Status.      Review of Systems         Anesthesia Plan  Type of Anesthesia, risks & benefits discussed:    Anesthesia Type: Gen Natural Airway  Intra-op Monitoring Plan: Standard ASA Monitors  Post Op Pain Control Plan: multimodal analgesia  Induction:  IV  Informed Consent: Informed consent signed with the Patient and all parties understand the risks and agree with anesthesia plan.  All questions answered.   ASA Score: 2    Ready For Surgery From Anesthesia Perspective.     .

## 2022-11-30 NOTE — TRANSFER OF CARE
"Anesthesia Transfer of Care Note    Patient: Veronique Louise    Procedure(s) Performed: Procedure(s) (LRB):  COLONOSCOPY (N/A)    Patient location: PACU    Anesthesia Type: general    Transport from OR: Transported from OR on room air with adequate spontaneous ventilation    Post pain: adequate analgesia    Post assessment: no apparent anesthetic complications    Post vital signs: stable    Level of consciousness: awake    Nausea/Vomiting: no nausea/vomiting    Complications: none    Transfer of care protocol was followed      Last vitals:   Visit Vitals  /62(BP Location: Left arm, Patient Position: Lying)   Pulse 69   Temp 37 °C (98.6 °F) (Temporal)   Resp 17   Ht 5' 4" (1.626 m)   Wt 84.8 kg (187 lb)   SpO2 100%   Breastfeeding No   BMI 32.10 kg/m²     "

## 2022-11-30 NOTE — PROVATION PATIENT INSTRUCTIONS
Discharge Summary/Instructions after an Endoscopic Procedure  Patient Name: Veronique Louise  Patient MRN: [id number]  Patient YOB: 1980  Wednesday, November 30, 2022  Albin Louise MD  Dear patient,  As a result of recent federal legislation (The Federal Cures Act), you may   receive lab or pathology results from your procedure in your [facility]   account before your physician is able to contact you. Your physician or   their representative will relay the results to you with their   recommendations at their soonest availability.  Thank you,  RESTRICTIONS:  During your procedure today, you received medications for sedation.  These   medications may affect your judgment, balance and coordination.  Therefore,   for 24 hours, you have the following restrictions:   - DO NOT drive a car, operate machinery, make legal/financial decisions,   sign important papers or drink alcohol.    ACTIVITY:  Today: no heavy lifting, straining or running due to procedural   sedation/anesthesia.  The following day: return to full activity including work.  DIET:  Eat and drink normally unless instructed otherwise.     TREATMENT FOR COMMON SIDE EFFECTS:  - Mild abdominal pain, nausea, belching, bloating or excessive gas:  rest,   eat lightly and use a heating pad.  - Sore Throat: treat with throat lozenges and/or gargle with warm salt   water.  - Because air was used during the procedure, expelling large amounts of air   from your rectum or belching is normal.  - If a bowel prep was taken, you may not have a bowel movement for 1-3 days.    This is normal.  SYMPTOMS TO WATCH FOR AND REPORT TO YOUR PHYSICIAN:  1. Abdominal pain or bloating, other than gas cramps.  2. Chest pain.  3. Back pain.  4. Signs of infection such as: chills or fever occurring within 24 hours   after the procedure.  5. Rectal bleeding, which would show as bright red, maroon, or black stools.   (A tablespoon of blood from the rectum is not serious,  especially if   hemorrhoids are present.)  6. Vomiting.  7. Weakness or dizziness.  GO DIRECTLY TO THE NEAREST EMERGENCY ROOM IF YOU HAVE ANY OF THE FOLLOWING:      Difficulty breathing              Chills and/or fever over 101 F   Persistent vomiting and/or vomiting blood   Severe abdominal pain   Severe chest pain   Black, tarry stools   Bleeding- more than one tablespoon   Any other symptom or condition that you feel may need urgent attention  Your doctor recommends these additional instructions:  If any biopsies were taken, your doctors clinic will contact you in 1 to 2   weeks with any results.  - Patient has a contact number available for emergencies.  The signs and   symptoms of potential delayed complications were discussed with the   patient.  Return to normal activities tomorrow.  Written discharge   instructions were provided to the patient.   - Discharge patient to home.   - Resume previous diet.   - Continue present medications.   - Repeat colonoscopy in 5 years for screening purposes.  For questions, problems or results please call your physician - Albin Louise MD at Work:  [phone].  [facility], EMERGENCY ROOM PHONE NUMBER: [phone]  IF A COMPLICATION OR EMERGENCY SITUATION ARISES AND YOU ARE UNABLE TO REACH   YOUR PHYSICIAN - GO DIRECTLY TO THE EMERGENCY ROOM.  Albin Louise MD  11/30/2022 10:54:48 AM  This report has been verified and signed electronically.  Dear patient,  As a result of recent federal legislation (The Federal Cures Act), you may   receive lab or pathology results from your procedure in your [facility]   account before your physician is able to contact you. Your physician or   their representative will relay the results to you with their   recommendations at their soonest availability.  Thank you,  PROVATION

## 2023-03-23 ENCOUNTER — CLINICAL SUPPORT (OUTPATIENT)
Dept: OTHER | Facility: CLINIC | Age: 43
End: 2023-03-23
Payer: COMMERCIAL

## 2023-03-23 DIAGNOSIS — Z00.8 ENCOUNTER FOR OTHER GENERAL EXAMINATION: ICD-10-CM

## 2023-03-24 VITALS
HEIGHT: 64 IN | BODY MASS INDEX: 33.63 KG/M2 | SYSTOLIC BLOOD PRESSURE: 128 MMHG | WEIGHT: 197 LBS | DIASTOLIC BLOOD PRESSURE: 86 MMHG

## 2023-03-24 LAB
GLUCOSE SERPL-MCNC: 97 MG/DL (ref 60–140)
HDLC SERPL-MCNC: 81 MG/DL
POC CHOLESTEROL, LDL (DOCK): 121 MG/DL
POC CHOLESTEROL, TOTAL: 216 MG/DL
TRIGL SERPL-MCNC: 81 MG/DL

## 2023-04-21 ENCOUNTER — OFFICE VISIT (OUTPATIENT)
Dept: DERMATOLOGY | Facility: CLINIC | Age: 43
End: 2023-04-21
Payer: COMMERCIAL

## 2023-04-21 DIAGNOSIS — D22.9 BENIGN NEVUS: ICD-10-CM

## 2023-04-21 DIAGNOSIS — D18.01 ANGIOMA OF SKIN: ICD-10-CM

## 2023-04-21 DIAGNOSIS — L81.4 LENTIGINES: Primary | ICD-10-CM

## 2023-04-21 DIAGNOSIS — Z12.83 SCREENING EXAM FOR SKIN CANCER: ICD-10-CM

## 2023-04-21 PROCEDURE — 99999 PR PBB SHADOW E&M-EST. PATIENT-LVL III: ICD-10-PCS | Mod: PBBFAC,,, | Performed by: DERMATOLOGY

## 2023-04-21 PROCEDURE — 1159F MED LIST DOCD IN RCRD: CPT | Mod: CPTII,S$GLB,, | Performed by: DERMATOLOGY

## 2023-04-21 PROCEDURE — 1160F PR REVIEW ALL MEDS BY PRESCRIBER/CLIN PHARMACIST DOCUMENTED: ICD-10-PCS | Mod: CPTII,S$GLB,, | Performed by: DERMATOLOGY

## 2023-04-21 PROCEDURE — 1160F RVW MEDS BY RX/DR IN RCRD: CPT | Mod: CPTII,S$GLB,, | Performed by: DERMATOLOGY

## 2023-04-21 PROCEDURE — 99999 PR PBB SHADOW E&M-EST. PATIENT-LVL III: CPT | Mod: PBBFAC,,, | Performed by: DERMATOLOGY

## 2023-04-21 PROCEDURE — 1159F PR MEDICATION LIST DOCUMENTED IN MEDICAL RECORD: ICD-10-PCS | Mod: CPTII,S$GLB,, | Performed by: DERMATOLOGY

## 2023-04-21 PROCEDURE — 99203 OFFICE O/P NEW LOW 30 MIN: CPT | Mod: S$GLB,,, | Performed by: DERMATOLOGY

## 2023-04-21 PROCEDURE — 99203 PR OFFICE/OUTPT VISIT, NEW, LEVL III, 30-44 MIN: ICD-10-PCS | Mod: S$GLB,,, | Performed by: DERMATOLOGY

## 2023-04-21 NOTE — PROGRESS NOTES
"  Subjective:      Patient ID:  Veronique Louise is a 42 y.o. female who presents for   Chief Complaint   Patient presents with    Skin Check     UBSE    Lesion     L ear     Here for Upper Body Skin Exam    Last seen by dermatology 3-4 years    No h/o nmsc or mm      Pt reports a moderate amount of lifetime sun exposure    Patient with specific complaint of lesion(s)  Location: L ear  Duration: 5 years  Symptoms:  itchy  Relieving factors/Previous treatments: shaved off by dr. Swann 4 years ago (unknown path) then recurred then "fell off" 2 weeks ago    Review of Systems   Skin:  Positive for daily sunscreen use, activity-related sunscreen use and wears hat (activities). Negative for recent sunburn.   Hematologic/Lymphatic: Does not bruise/bleed easily.     Objective:   Physical Exam   Constitutional: She appears well-developed and well-nourished. No distress.   Neurological: She is alert and oriented to person, place, and time. She is not disoriented.   Psychiatric: She has a normal mood and affect.   Skin:   Areas Examined (abnormalities noted in diagram):   Head / Face Inspection Performed  Neck Inspection Performed  Chest / Axilla Inspection Performed  Back Inspection Performed  RUE Inspected  LUE Inspection Performed               Diagram Legend     Erythematous scaling macule/papule c/w actinic keratosis       Vascular papule c/w angioma      Pigmented verrucoid papule/plaque c/w seborrheic keratosis      Yellow umbilicated papule c/w sebaceous hyperplasia      Irregularly shaped tan macule c/w lentigo     1-2 mm smooth white papules consistent with Milia      Movable subcutaneous cyst with punctum c/w epidermal inclusion cyst      Subcutaneous movable cyst c/w pilar cyst      Firm pink to brown papule c/w dermatofibroma      Pedunculated fleshy papule(s) c/w skin tag(s)      Evenly pigmented macule c/w junctional nevus     Mildly variegated pigmented, slightly irregular-bordered macule c/w mildly " atypical nevus      Flesh colored to evenly pigmented papule c/w intradermal nevus       Pink pearly papule/plaque c/w basal cell carcinoma      Erythematous hyperkeratotic cursted plaque c/w SCC      Surgical scar with no sign of skin cancer recurrence      Open and closed comedones      Inflammatory papules and pustules      Verrucoid papule consistent consistent with wart     Erythematous eczematous patches and plaques     Dystrophic onycholytic nail with subungual debris c/w onychomycosis     Umbilicated papule    Erythematous-base heme-crusted tan verrucoid plaque consistent with inflamed seborrheic keratosis     Erythematous Silvery Scaling Plaque c/w Psoriasis     See annotation      Assessment / Plan:        Lentigines  This is a benign hyperpigmented sun induced lesion. Recommend daily sun protection/avoidance and use of at least SPF 30, broad spectrum sunscreen (OTC drug) will reduce the number of new lesions. Treatment of these benign lesions are considered cosmetic.    Angioma of skin  This is a benign vascular lesion. Reassurance given. No treatment required.     Benign nevus   - minor problem and chronic.   Reassurance given to patient. No treatment necessary.     Screening exam for skin cancer  Upper body skin examination performed today including at least 6 points as noted in physical examination. No lesions suspicious for malignancy noted.    Recommend daily sun protection/avoidance and use of at least SPF 30, broad spectrum sunscreen (OTC drug).       Pt to schedule a video visit to discuss/evaluate ear lesion should it recur  Request records from dr. niño             Follow up if symptoms worsen or fail to improve.
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
## 2023-05-02 ENCOUNTER — LAB VISIT (OUTPATIENT)
Dept: LAB | Facility: OTHER | Age: 43
End: 2023-05-02
Attending: STUDENT IN AN ORGANIZED HEALTH CARE EDUCATION/TRAINING PROGRAM
Payer: COMMERCIAL

## 2023-05-02 ENCOUNTER — OFFICE VISIT (OUTPATIENT)
Dept: INTERNAL MEDICINE | Facility: CLINIC | Age: 43
End: 2023-05-02
Payer: COMMERCIAL

## 2023-05-02 VITALS
OXYGEN SATURATION: 100 % | SYSTOLIC BLOOD PRESSURE: 130 MMHG | WEIGHT: 211.88 LBS | HEIGHT: 64 IN | DIASTOLIC BLOOD PRESSURE: 88 MMHG | HEART RATE: 71 BPM | BODY MASS INDEX: 36.17 KG/M2

## 2023-05-02 DIAGNOSIS — Z00.00 ANNUAL PHYSICAL EXAM: ICD-10-CM

## 2023-05-02 DIAGNOSIS — Z76.89 ESTABLISHING CARE WITH NEW DOCTOR, ENCOUNTER FOR: Primary | ICD-10-CM

## 2023-05-02 DIAGNOSIS — Z80.0 FAMILY HISTORY OF COLON CANCER: ICD-10-CM

## 2023-05-02 DIAGNOSIS — D64.9 ANEMIA, UNSPECIFIED TYPE: ICD-10-CM

## 2023-05-02 DIAGNOSIS — F33.42 RECURRENT MAJOR DEPRESSIVE DISORDER, IN FULL REMISSION: ICD-10-CM

## 2023-05-02 DIAGNOSIS — E05.90 SUBCLINICAL HYPERTHYROIDISM: ICD-10-CM

## 2023-05-02 DIAGNOSIS — E66.01 OBESITY, CLASS III, BMI 40-49.9 (MORBID OBESITY): ICD-10-CM

## 2023-05-02 DIAGNOSIS — Z90.79 STATUS POST BILATERAL SALPINGECTOMY: ICD-10-CM

## 2023-05-02 LAB
ALBUMIN SERPL BCP-MCNC: 3.8 G/DL (ref 3.5–5.2)
ALP SERPL-CCNC: 45 U/L (ref 55–135)
ALT SERPL W/O P-5'-P-CCNC: 20 U/L (ref 10–44)
ANION GAP SERPL CALC-SCNC: 5 MMOL/L (ref 8–16)
AST SERPL-CCNC: 17 U/L (ref 10–40)
BASOPHILS # BLD AUTO: 0.03 K/UL (ref 0–0.2)
BASOPHILS NFR BLD: 0.3 % (ref 0–1.9)
BILIRUB SERPL-MCNC: 0.3 MG/DL (ref 0.1–1)
BUN SERPL-MCNC: 9 MG/DL (ref 6–20)
CALCIUM SERPL-MCNC: 9 MG/DL (ref 8.7–10.5)
CHLORIDE SERPL-SCNC: 103 MMOL/L (ref 95–110)
CO2 SERPL-SCNC: 29 MMOL/L (ref 23–29)
CREAT SERPL-MCNC: 0.7 MG/DL (ref 0.5–1.4)
DIFFERENTIAL METHOD: ABNORMAL
EOSINOPHIL # BLD AUTO: 0 K/UL (ref 0–0.5)
EOSINOPHIL NFR BLD: 0.5 % (ref 0–8)
ERYTHROCYTE [DISTWIDTH] IN BLOOD BY AUTOMATED COUNT: 12.7 % (ref 11.5–14.5)
EST. GFR  (NO RACE VARIABLE): >60 ML/MIN/1.73 M^2
ESTIMATED AVG GLUCOSE: 103 MG/DL (ref 68–131)
FERRITIN SERPL-MCNC: 36 NG/ML (ref 20–300)
GLUCOSE SERPL-MCNC: 95 MG/DL (ref 70–110)
HBA1C MFR BLD: 5.2 % (ref 4–5.6)
HCT VFR BLD AUTO: 35.9 % (ref 37–48.5)
HGB BLD-MCNC: 11.5 G/DL (ref 12–16)
IMM GRANULOCYTES # BLD AUTO: 0.01 K/UL (ref 0–0.04)
IMM GRANULOCYTES NFR BLD AUTO: 0.1 % (ref 0–0.5)
IRON SERPL-MCNC: 97 UG/DL (ref 30–160)
LYMPHOCYTES # BLD AUTO: 3.1 K/UL (ref 1–4.8)
LYMPHOCYTES NFR BLD: 34.8 % (ref 18–48)
MCH RBC QN AUTO: 29.6 PG (ref 27–31)
MCHC RBC AUTO-ENTMCNC: 32 G/DL (ref 32–36)
MCV RBC AUTO: 93 FL (ref 82–98)
MONOCYTES # BLD AUTO: 0.7 K/UL (ref 0.3–1)
MONOCYTES NFR BLD: 8 % (ref 4–15)
NEUTROPHILS # BLD AUTO: 5 K/UL (ref 1.8–7.7)
NEUTROPHILS NFR BLD: 56.3 % (ref 38–73)
NRBC BLD-RTO: 0 /100 WBC
PLATELET # BLD AUTO: 249 K/UL (ref 150–450)
PMV BLD AUTO: 9.9 FL (ref 9.2–12.9)
POTASSIUM SERPL-SCNC: 3.8 MMOL/L (ref 3.5–5.1)
PROT SERPL-MCNC: 7.1 G/DL (ref 6–8.4)
RBC # BLD AUTO: 3.88 M/UL (ref 4–5.4)
SATURATED IRON: 26 % (ref 20–50)
SODIUM SERPL-SCNC: 137 MMOL/L (ref 136–145)
T4 FREE SERPL-MCNC: 1.04 NG/DL (ref 0.71–1.51)
TOTAL IRON BINDING CAPACITY: 374 UG/DL (ref 250–450)
TRANSFERRIN SERPL-MCNC: 253 MG/DL (ref 200–375)
TSH SERPL DL<=0.005 MIU/L-ACNC: 0.27 UIU/ML (ref 0.4–4)
WBC # BLD AUTO: 8.82 K/UL (ref 3.9–12.7)

## 2023-05-02 PROCEDURE — 99386 PREV VISIT NEW AGE 40-64: CPT | Mod: S$GLB,,, | Performed by: STUDENT IN AN ORGANIZED HEALTH CARE EDUCATION/TRAINING PROGRAM

## 2023-05-02 PROCEDURE — 1159F PR MEDICATION LIST DOCUMENTED IN MEDICAL RECORD: ICD-10-PCS | Mod: CPTII,S$GLB,, | Performed by: STUDENT IN AN ORGANIZED HEALTH CARE EDUCATION/TRAINING PROGRAM

## 2023-05-02 PROCEDURE — 36415 COLL VENOUS BLD VENIPUNCTURE: CPT | Performed by: STUDENT IN AN ORGANIZED HEALTH CARE EDUCATION/TRAINING PROGRAM

## 2023-05-02 PROCEDURE — 3008F PR BODY MASS INDEX (BMI) DOCUMENTED: ICD-10-PCS | Mod: CPTII,S$GLB,, | Performed by: STUDENT IN AN ORGANIZED HEALTH CARE EDUCATION/TRAINING PROGRAM

## 2023-05-02 PROCEDURE — 99999 PR PBB SHADOW E&M-EST. PATIENT-LVL III: CPT | Mod: PBBFAC,,, | Performed by: STUDENT IN AN ORGANIZED HEALTH CARE EDUCATION/TRAINING PROGRAM

## 2023-05-02 PROCEDURE — 82728 ASSAY OF FERRITIN: CPT | Performed by: STUDENT IN AN ORGANIZED HEALTH CARE EDUCATION/TRAINING PROGRAM

## 2023-05-02 PROCEDURE — 1159F MED LIST DOCD IN RCRD: CPT | Mod: CPTII,S$GLB,, | Performed by: STUDENT IN AN ORGANIZED HEALTH CARE EDUCATION/TRAINING PROGRAM

## 2023-05-02 PROCEDURE — 84439 ASSAY OF FREE THYROXINE: CPT | Performed by: STUDENT IN AN ORGANIZED HEALTH CARE EDUCATION/TRAINING PROGRAM

## 2023-05-02 PROCEDURE — 3075F SYST BP GE 130 - 139MM HG: CPT | Mod: CPTII,S$GLB,, | Performed by: STUDENT IN AN ORGANIZED HEALTH CARE EDUCATION/TRAINING PROGRAM

## 2023-05-02 PROCEDURE — 99386 PR PREVENTIVE VISIT,NEW,40-64: ICD-10-PCS | Mod: S$GLB,,, | Performed by: STUDENT IN AN ORGANIZED HEALTH CARE EDUCATION/TRAINING PROGRAM

## 2023-05-02 PROCEDURE — 83036 HEMOGLOBIN GLYCOSYLATED A1C: CPT | Performed by: STUDENT IN AN ORGANIZED HEALTH CARE EDUCATION/TRAINING PROGRAM

## 2023-05-02 PROCEDURE — 3008F BODY MASS INDEX DOCD: CPT | Mod: CPTII,S$GLB,, | Performed by: STUDENT IN AN ORGANIZED HEALTH CARE EDUCATION/TRAINING PROGRAM

## 2023-05-02 PROCEDURE — 3079F DIAST BP 80-89 MM HG: CPT | Mod: CPTII,S$GLB,, | Performed by: STUDENT IN AN ORGANIZED HEALTH CARE EDUCATION/TRAINING PROGRAM

## 2023-05-02 PROCEDURE — 99999 PR PBB SHADOW E&M-EST. PATIENT-LVL III: ICD-10-PCS | Mod: PBBFAC,,, | Performed by: STUDENT IN AN ORGANIZED HEALTH CARE EDUCATION/TRAINING PROGRAM

## 2023-05-02 PROCEDURE — 80053 COMPREHEN METABOLIC PANEL: CPT | Performed by: STUDENT IN AN ORGANIZED HEALTH CARE EDUCATION/TRAINING PROGRAM

## 2023-05-02 PROCEDURE — 3075F PR MOST RECENT SYSTOLIC BLOOD PRESS GE 130-139MM HG: ICD-10-PCS | Mod: CPTII,S$GLB,, | Performed by: STUDENT IN AN ORGANIZED HEALTH CARE EDUCATION/TRAINING PROGRAM

## 2023-05-02 PROCEDURE — 85025 COMPLETE CBC W/AUTO DIFF WBC: CPT | Performed by: STUDENT IN AN ORGANIZED HEALTH CARE EDUCATION/TRAINING PROGRAM

## 2023-05-02 PROCEDURE — 3079F PR MOST RECENT DIASTOLIC BLOOD PRESSURE 80-89 MM HG: ICD-10-PCS | Mod: CPTII,S$GLB,, | Performed by: STUDENT IN AN ORGANIZED HEALTH CARE EDUCATION/TRAINING PROGRAM

## 2023-05-02 PROCEDURE — 84443 ASSAY THYROID STIM HORMONE: CPT | Performed by: STUDENT IN AN ORGANIZED HEALTH CARE EDUCATION/TRAINING PROGRAM

## 2023-05-02 PROCEDURE — 84466 ASSAY OF TRANSFERRIN: CPT | Performed by: STUDENT IN AN ORGANIZED HEALTH CARE EDUCATION/TRAINING PROGRAM

## 2023-06-23 ENCOUNTER — PATIENT MESSAGE (OUTPATIENT)
Dept: INTERNAL MEDICINE | Facility: CLINIC | Age: 43
End: 2023-06-23
Payer: COMMERCIAL

## 2023-07-31 ENCOUNTER — OFFICE VISIT (OUTPATIENT)
Dept: ENDOCRINOLOGY | Facility: CLINIC | Age: 43
End: 2023-07-31
Payer: COMMERCIAL

## 2023-07-31 VITALS
DIASTOLIC BLOOD PRESSURE: 82 MMHG | WEIGHT: 210.63 LBS | OXYGEN SATURATION: 98 % | SYSTOLIC BLOOD PRESSURE: 150 MMHG | RESPIRATION RATE: 18 BRPM | BODY MASS INDEX: 35.96 KG/M2 | HEART RATE: 98 BPM | HEIGHT: 64 IN

## 2023-07-31 DIAGNOSIS — E05.90 HYPERTHYROIDISM, SUBCLINICAL: Primary | ICD-10-CM

## 2023-07-31 DIAGNOSIS — E05.90 SUBCLINICAL HYPERTHYROIDISM: ICD-10-CM

## 2023-07-31 PROCEDURE — 3077F SYST BP >= 140 MM HG: CPT | Mod: CPTII,S$GLB,, | Performed by: INTERNAL MEDICINE

## 2023-07-31 PROCEDURE — 99999 PR PBB SHADOW E&M-EST. PATIENT-LVL IV: CPT | Mod: PBBFAC,,, | Performed by: INTERNAL MEDICINE

## 2023-07-31 PROCEDURE — 1160F PR REVIEW ALL MEDS BY PRESCRIBER/CLIN PHARMACIST DOCUMENTED: ICD-10-PCS | Mod: CPTII,S$GLB,, | Performed by: INTERNAL MEDICINE

## 2023-07-31 PROCEDURE — 3008F BODY MASS INDEX DOCD: CPT | Mod: CPTII,S$GLB,, | Performed by: INTERNAL MEDICINE

## 2023-07-31 PROCEDURE — 3008F PR BODY MASS INDEX (BMI) DOCUMENTED: ICD-10-PCS | Mod: CPTII,S$GLB,, | Performed by: INTERNAL MEDICINE

## 2023-07-31 PROCEDURE — 99999 PR PBB SHADOW E&M-EST. PATIENT-LVL IV: ICD-10-PCS | Mod: PBBFAC,,, | Performed by: INTERNAL MEDICINE

## 2023-07-31 PROCEDURE — 3044F PR MOST RECENT HEMOGLOBIN A1C LEVEL <7.0%: ICD-10-PCS | Mod: CPTII,S$GLB,, | Performed by: INTERNAL MEDICINE

## 2023-07-31 PROCEDURE — 1159F MED LIST DOCD IN RCRD: CPT | Mod: CPTII,S$GLB,, | Performed by: INTERNAL MEDICINE

## 2023-07-31 PROCEDURE — 3079F DIAST BP 80-89 MM HG: CPT | Mod: CPTII,S$GLB,, | Performed by: INTERNAL MEDICINE

## 2023-07-31 PROCEDURE — 3044F HG A1C LEVEL LT 7.0%: CPT | Mod: CPTII,S$GLB,, | Performed by: INTERNAL MEDICINE

## 2023-07-31 PROCEDURE — 3079F PR MOST RECENT DIASTOLIC BLOOD PRESSURE 80-89 MM HG: ICD-10-PCS | Mod: CPTII,S$GLB,, | Performed by: INTERNAL MEDICINE

## 2023-07-31 PROCEDURE — 1159F PR MEDICATION LIST DOCUMENTED IN MEDICAL RECORD: ICD-10-PCS | Mod: CPTII,S$GLB,, | Performed by: INTERNAL MEDICINE

## 2023-07-31 PROCEDURE — 1160F RVW MEDS BY RX/DR IN RCRD: CPT | Mod: CPTII,S$GLB,, | Performed by: INTERNAL MEDICINE

## 2023-07-31 PROCEDURE — 99204 PR OFFICE/OUTPT VISIT, NEW, LEVL IV, 45-59 MIN: ICD-10-PCS | Mod: S$GLB,,, | Performed by: INTERNAL MEDICINE

## 2023-07-31 PROCEDURE — 3077F PR MOST RECENT SYSTOLIC BLOOD PRESSURE >= 140 MM HG: ICD-10-PCS | Mod: CPTII,S$GLB,, | Performed by: INTERNAL MEDICINE

## 2023-07-31 PROCEDURE — 99204 OFFICE O/P NEW MOD 45 MIN: CPT | Mod: S$GLB,,, | Performed by: INTERNAL MEDICINE

## 2023-07-31 NOTE — ASSESSMENT & PLAN NOTE
A1c 5.2 at last measurement.  Discussed dietary changes to decreased her DM risk. She would like to see a dietician.
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
## 2023-07-31 NOTE — ASSESSMENT & PLAN NOTE
Etiologies would include Graves' disease and toxic nodule, given chronicity of her symptoms thyroiditis is less likely.   -Repeat TSH and FT4  -Add T3 and TRAB  -If no longer hyperthyroid, yearly follow-up with labs  -In case she is still hyperthyroid and has:     POS TRAB - will treat with methimazole     NEG TRAB - will order RAIU scan

## 2023-08-10 ENCOUNTER — LAB VISIT (OUTPATIENT)
Dept: LAB | Facility: OTHER | Age: 43
End: 2023-08-10
Attending: STUDENT IN AN ORGANIZED HEALTH CARE EDUCATION/TRAINING PROGRAM
Payer: COMMERCIAL

## 2023-08-10 DIAGNOSIS — E05.90 HYPERTHYROIDISM, SUBCLINICAL: ICD-10-CM

## 2023-08-10 LAB
T4 FREE SERPL-MCNC: 1 NG/DL (ref 0.71–1.51)
TSH SERPL DL<=0.005 MIU/L-ACNC: 0.14 UIU/ML (ref 0.4–4)

## 2023-08-10 PROCEDURE — 84480 ASSAY TRIIODOTHYRONINE (T3): CPT | Performed by: STUDENT IN AN ORGANIZED HEALTH CARE EDUCATION/TRAINING PROGRAM

## 2023-08-10 PROCEDURE — 84439 ASSAY OF FREE THYROXINE: CPT | Performed by: STUDENT IN AN ORGANIZED HEALTH CARE EDUCATION/TRAINING PROGRAM

## 2023-08-10 PROCEDURE — 83520 IMMUNOASSAY QUANT NOS NONAB: CPT | Performed by: STUDENT IN AN ORGANIZED HEALTH CARE EDUCATION/TRAINING PROGRAM

## 2023-08-10 PROCEDURE — 36415 COLL VENOUS BLD VENIPUNCTURE: CPT | Performed by: STUDENT IN AN ORGANIZED HEALTH CARE EDUCATION/TRAINING PROGRAM

## 2023-08-10 PROCEDURE — 84443 ASSAY THYROID STIM HORMONE: CPT | Performed by: STUDENT IN AN ORGANIZED HEALTH CARE EDUCATION/TRAINING PROGRAM

## 2023-08-11 LAB — TSH RECEP AB SER-ACNC: <1.1 IU/L (ref 0–1.75)

## 2023-08-12 LAB — T3 SERPL-MCNC: 87 NG/DL (ref 60–180)

## 2023-08-14 DIAGNOSIS — E05.90 SUBCLINICAL HYPERTHYROIDISM: Primary | ICD-10-CM

## 2023-08-15 ENCOUNTER — PATIENT MESSAGE (OUTPATIENT)
Dept: ENDOCRINOLOGY | Facility: CLINIC | Age: 43
End: 2023-08-15
Payer: COMMERCIAL

## 2023-08-16 ENCOUNTER — OFFICE VISIT (OUTPATIENT)
Dept: OBSTETRICS AND GYNECOLOGY | Facility: CLINIC | Age: 43
End: 2023-08-16
Payer: COMMERCIAL

## 2023-08-16 VITALS
DIASTOLIC BLOOD PRESSURE: 80 MMHG | BODY MASS INDEX: 36.32 KG/M2 | SYSTOLIC BLOOD PRESSURE: 126 MMHG | WEIGHT: 212.75 LBS | HEIGHT: 64 IN

## 2023-08-16 DIAGNOSIS — Z12.39 ENCOUNTER FOR SCREENING FOR MALIGNANT NEOPLASM OF BREAST, UNSPECIFIED SCREENING MODALITY: Primary | ICD-10-CM

## 2023-08-16 DIAGNOSIS — R92.30 DENSE BREAST TISSUE ON MAMMOGRAM: ICD-10-CM

## 2023-08-16 DIAGNOSIS — Z12.4 ENCOUNTER FOR PAP SMEAR OF CERVIX WITH HPV DNA COTESTING: ICD-10-CM

## 2023-08-16 DIAGNOSIS — Z01.419 WELL WOMAN EXAM WITH ROUTINE GYNECOLOGICAL EXAM: ICD-10-CM

## 2023-08-16 PROCEDURE — 88175 CYTOPATH C/V AUTO FLUID REDO: CPT | Performed by: ADVANCED PRACTICE MIDWIFE

## 2023-08-16 PROCEDURE — 3044F HG A1C LEVEL LT 7.0%: CPT | Mod: CPTII,S$GLB,, | Performed by: ADVANCED PRACTICE MIDWIFE

## 2023-08-16 PROCEDURE — 3079F PR MOST RECENT DIASTOLIC BLOOD PRESSURE 80-89 MM HG: ICD-10-PCS | Mod: CPTII,S$GLB,, | Performed by: ADVANCED PRACTICE MIDWIFE

## 2023-08-16 PROCEDURE — 3074F SYST BP LT 130 MM HG: CPT | Mod: CPTII,S$GLB,, | Performed by: ADVANCED PRACTICE MIDWIFE

## 2023-08-16 PROCEDURE — 3044F PR MOST RECENT HEMOGLOBIN A1C LEVEL <7.0%: ICD-10-PCS | Mod: CPTII,S$GLB,, | Performed by: ADVANCED PRACTICE MIDWIFE

## 2023-08-16 PROCEDURE — 99999 PR PBB SHADOW E&M-EST. PATIENT-LVL III: CPT | Mod: PBBFAC,,, | Performed by: ADVANCED PRACTICE MIDWIFE

## 2023-08-16 PROCEDURE — 99396 PREV VISIT EST AGE 40-64: CPT | Mod: S$GLB,,, | Performed by: ADVANCED PRACTICE MIDWIFE

## 2023-08-16 PROCEDURE — 3008F PR BODY MASS INDEX (BMI) DOCUMENTED: ICD-10-PCS | Mod: CPTII,S$GLB,, | Performed by: ADVANCED PRACTICE MIDWIFE

## 2023-08-16 PROCEDURE — 1159F MED LIST DOCD IN RCRD: CPT | Mod: CPTII,S$GLB,, | Performed by: ADVANCED PRACTICE MIDWIFE

## 2023-08-16 PROCEDURE — 87624 HPV HI-RISK TYP POOLED RSLT: CPT | Performed by: ADVANCED PRACTICE MIDWIFE

## 2023-08-16 PROCEDURE — 3008F BODY MASS INDEX DOCD: CPT | Mod: CPTII,S$GLB,, | Performed by: ADVANCED PRACTICE MIDWIFE

## 2023-08-16 PROCEDURE — 1159F PR MEDICATION LIST DOCUMENTED IN MEDICAL RECORD: ICD-10-PCS | Mod: CPTII,S$GLB,, | Performed by: ADVANCED PRACTICE MIDWIFE

## 2023-08-16 PROCEDURE — 3074F PR MOST RECENT SYSTOLIC BLOOD PRESSURE < 130 MM HG: ICD-10-PCS | Mod: CPTII,S$GLB,, | Performed by: ADVANCED PRACTICE MIDWIFE

## 2023-08-16 PROCEDURE — 99396 PR PREVENTIVE VISIT,EST,40-64: ICD-10-PCS | Mod: S$GLB,,, | Performed by: ADVANCED PRACTICE MIDWIFE

## 2023-08-16 PROCEDURE — 3079F DIAST BP 80-89 MM HG: CPT | Mod: CPTII,S$GLB,, | Performed by: ADVANCED PRACTICE MIDWIFE

## 2023-08-16 PROCEDURE — 99999 PR PBB SHADOW E&M-EST. PATIENT-LVL III: ICD-10-PCS | Mod: PBBFAC,,, | Performed by: ADVANCED PRACTICE MIDWIFE
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
## 2023-08-22 LAB
FINAL PATHOLOGIC DIAGNOSIS: NORMAL
HPV HR 12 DNA SPEC QL NAA+PROBE: NEGATIVE
HPV16 AG SPEC QL: NEGATIVE
HPV18 DNA SPEC QL NAA+PROBE: NEGATIVE
Lab: NORMAL

## 2023-08-23 ENCOUNTER — CLINICAL SUPPORT (OUTPATIENT)
Dept: INTERNAL MEDICINE | Facility: CLINIC | Age: 43
End: 2023-08-23
Payer: COMMERCIAL

## 2023-08-23 DIAGNOSIS — E66.9 OBESITY (BMI 35.0-39.9 WITHOUT COMORBIDITY): Primary | ICD-10-CM

## 2023-08-23 DIAGNOSIS — Z71.3 ENCOUNTER FOR DIETARY COUNSELING AND SURVEILLANCE: ICD-10-CM

## 2023-08-23 PROCEDURE — 97802 MEDICAL NUTRITION INDIV IN: CPT | Mod: 95,,,

## 2023-08-23 PROCEDURE — 97802 PR MED NUTR THER, 1ST, INDIV, EA 15 MIN: ICD-10-PCS | Mod: 95,,,

## 2023-08-23 NOTE — PROGRESS NOTES
The patient location is: [place], [place]  The chief complaint leading to consultation is: dietary counseling    Visit type: audiovisual    Face to Face time with patient: 60 minutes  90 minutes of total time spent on the encounter, which includes face to face time and non-face to face time preparing to see the patient (eg, review of tests), Obtaining and/or reviewing separately obtained history, Documenting clinical information in the electronic or other health record, Independently interpreting results (not separately reported) and communicating results to the patient/family/caregiver, or Care coordination (not separately reported).         Each patient to whom he or she provides medical services by telemedicine is:  (1) informed of the relationship between the physician and patient and the respective role of any other health care provider with respect to management of the patient; and (2) notified that he or she may decline to receive medical services by telemedicine and may withdraw from such care at any time.    Notes:    Nutrition Assessment  Session Time:  60 minutes      Client name:  Veronique Louise  :  1980  Age:  43 y.o.  Gender:  female    Client states:  A very pleasant Ms. Louise is here for nutrition counseling. Patient works from home doing  for [employer]. She is  with 2 children aged 13 and 8 years. Her main concern today is preventing diabetes, which runs in her family. She would also like to lose some weight. Patient shares that she has a sweet tooth and is looking for ways to cut down her sugar intake. She is prone to grazing, in an effort to eat less calories at a time, and she tends to wait until she is quite hungry to eat her first meal of the day. Suggested that she consider eating brunch at a specific, earlier time each day before extreme hunger sets in, and then have a planned snack in the afternoon. We discussed what cues indicate satiety for  "patient and how she can avoid eating too little or too much at meals. Educated patient on the benefits of fiber in managing her blood glucose and encouraged her to add focus on getting adequate fiber in her meals and snacks. Ms. Louise was interested in learning how much she actually eats in a day currently and was planning to use an neema to track her foods for 1 week as an experiment. We also brainstormed lower sugar desserts she might enjoy. Finally, encouraged patient to increase her walking to 3-4x per week, in addition to the running twice a week for 35 minutes that she already does. We reviewed her labs; suggested she consider an iron supplement as her HG/HCT is low.     Height:  64"     Weight:  212 lbs  BMI:  36.5  % Body Fat:  N/A    Clinical Signs/Symptoms  N/V/D:  none  Appetite:  good       Past Medical History:   Diagnosis Date    Anemia     Breast disorder     Abscess post childbirth    Gestational diabetes     Gestational with last child    HSV infection     Major depressive disorder, recurrent, unspecified 10/12/2015    Subclinical hyperthyroidism 12/19/2016       Past Surgical History:   Procedure Laterality Date    COLONOSCOPY N/A 11/30/2022    Procedure: COLONOSCOPY;  Surgeon: Albin Louise MD;  Location: [address]);  Service: Endoscopy;  Laterality: N/A;  pt requested Sutab  10/26 instructions to portal-st    EXPLORATORY LAPAROTOMY      AFTER UTERINE PERF    HYSTEROSCOPY      IUD REMOVAL WITH PERF    INCISION AND DRAINAGE OF BREAST Left 2015    INTRAUTERINE DEVICE INSERTION  2011    MIRENA / PARAGAURD     LAPAROSCOPIC SALPINGECTOMY Bilateral 12/26/2019    Procedure: SALPINGECTOMY, LAPAROSCOPIC;  Surgeon: Petr Foley Jr., MD;  Location: [facility];  Service: OB/GYN;  Laterality: Bilateral;    TONSILLECTOMY, ADENOIDECTOMY      TUBAL LIGATION         Medications    has a current medication list which includes the following prescription(s): buspirone, vyvanse, latuda, methimazole, " norethindrone, trazodone, and valacyclovir.    Vitamins, Minerals, and/or Supplements:  melatonin     Food/Medication Interactions:  Reviewed     Food Allergies or Intolerances:  NKFA     Social History    Marital status:    Employment:  [employer]    Social History     Tobacco Use    Smoking status: Never    Smokeless tobacco: Never   Substance Use Topics    Alcohol use: Yes     Alcohol/week: 1.0 standard drink of alcohol     Types: 1 Drinks containing 0.5 oz of alcohol per week     Comment: Occasionally.        Lab Reports   Sodium   Date Value Ref Range Status   05/02/2023 137 136 - 145 mmol/L Final     Potassium   Date Value Ref Range Status   05/02/2023 3.8 3.5 - 5.1 mmol/L Final     Chloride   Date Value Ref Range Status   05/02/2023 103 95 - 110 mmol/L Final     CO2   Date Value Ref Range Status   05/02/2023 29 23 - 29 mmol/L Final     Glucose   Date Value Ref Range Status   05/02/2023 95 70 - 110 mg/dL Final     BUN   Date Value Ref Range Status   05/02/2023 9 6 - 20 mg/dL Final     Creatinine   Date Value Ref Range Status   05/02/2023 0.7 0.5 - 1.4 mg/dL Final     Calcium   Date Value Ref Range Status   05/02/2023 9.0 8.7 - 10.5 mg/dL Final     Total Protein   Date Value Ref Range Status   05/02/2023 7.1 6.0 - 8.4 g/dL Final     Albumin   Date Value Ref Range Status   05/02/2023 3.8 3.5 - 5.2 g/dL Final     Total Bilirubin   Date Value Ref Range Status   05/02/2023 0.3 0.1 - 1.0 mg/dL Final     Comment:     For infants and newborns, interpretation of results should be based  on gestational age, weight and in agreement with clinical  observations.    Premature Infant recommended reference ranges:  Up to 24 hours.............<8.0 mg/dL  Up to 48 hours............<12.0 mg/dL  3-5 days..................<15.0 mg/dL  6-29 days.................<15.0 mg/dL       Alkaline Phosphatase   Date Value Ref Range Status   05/02/2023 45 (L) 55 - 135 U/L Final     AST   Date Value Ref Range Status   05/02/2023 17 10 - 40  U/L Final     ALT   Date Value Ref Range Status   05/02/2023 20 10 - 44 U/L Final     Anion Gap   Date Value Ref Range Status   05/02/2023 5 (L) 8 - 16 mmol/L Final     eGFR if    Date Value Ref Range Status   07/26/2022 >60 >60 mL/min/1.73 m^2 Final     eGFR if non    Date Value Ref Range Status   07/26/2022 >60 >60 mL/min/1.73 m^2 Final     Comment:     Calculation used to obtain the estimated glomerular filtration  rate (eGFR) is the CKD-EPI equation.         Lab Results   Component Value Date    WBC 8.82 05/02/2023    HGB 11.5 (L) 05/02/2023    HCT 35.9 (L) 05/02/2023    MCV 93 05/02/2023     05/02/2023        Lab Results   Component Value Date    CHOL 188 07/26/2022     Lab Results   Component Value Date    HDL 64 07/26/2022     Lab Results   Component Value Date    LDLCALC 110.8 07/26/2022     Lab Results   Component Value Date    TRIG 66 07/26/2022     Lab Results   Component Value Date    CHOLHDL 34.0 07/26/2022     Lab Results   Component Value Date    HGBA1C 5.2 05/02/2023     BP Readings from Last 1 Encounters:   08/16/23 126/80       Food History  Breakfast:  3-4 cups coffee with good amount of half & half and sugar (3-4 Tbsp), sipping throughout the morning  Mid-morning Snack:  none  Lunch:  usually 10:30-noon: smoothie with kale, pineapple, banana, peanut butter, water; sometimes also a small muffin from [employer] or an English muffin OR ham sandwich on wheat or white bread with hernández/mustard and pickles  Mid-afternoon Snack:  pistachios OR Kenia[employer] G[employer] Crackers with hummus  Dinner:  chicken sausage and pasta OR red beans & rice OR tortellini Fredy  Snack:  Lindt Sohail chocolate balls (3-5) OR cookies from the bakery  *Fluid intake:  coffee, water  Alcohol: 1-2 drinks per week at most    Exercise History:  Patient runs for 35 minutes (2.5 miles) twice a week and walks her dog about a mile 2x per week.    Cultural/Spiritual/Personal Preferences:  None  identified    Support System:  spouse, children, and friends    State of Change:  Contemplation    Barriers to Change:  busy schedule    Diagnosis    Food and nutrition related knowledge deficit related to diabetes prevention as evidenced by patient requested information on ways to avoid diabetes.    Intervention    RMR (Method:  Lee StMariola Ayala):  1616 kcal  Activity Factor:  1.3    EDITH:  2101 kcal    Goals:  1.  Aim for 2 meals, a planned afternoon snack and a planned dessert daily.  2.  Have breakfast daily between 10:30-11:30am.  3.  Continue with exercise regimen and consider increasing walks to 3-4x per week.  4. Include at least 2 sources of fiber (whole grains, fruits, vegetables, nuts, seeds, beans) at every meal.     Nutrition Education  The following education was provided to the patient:  Complimented patient on proactive role in health maintenance.  Complimented patient on physical activity efforts.  Discussed healthy snacking.   Discussed label reading.  Discussed Iron-Rich Nutrition Therapy.  Suggested dietary modifications based on current dietary behaviors and individual food preferences.  Discussed nutrition-related lab values and dietary and/or lifestyle factors affecting them.  Discussed sugary foods and/or beverages (potential health consequences of excessive sugar intake, ways to reduce sugar intake, healthy beverage alternatives, etc.).  Discussed recommended fiber intake and food sources of such.  Discussed [place] client resources (may include but not limited to Eat Fit Shopping List, Fueling Well on the Go, and Meal Planning Guide).  Discussed vitamin/mineral recommendations (may include but not limited to MVI, Vitamin D, Calcium, and/or Iron) and associated food sources.  Discussed goal setting.  Provided ongoing support, encouragement, and guidance toward improved health efforts.    Patient verbalized understanding of nutrition education and recommendations received.    Handouts  Provided  Meal Planning Guide  Eat Fit Shopping List  Eat Fit Shasha  Fueling Well On-The-Go  High Fiber MNT    Monitoring/Evaluation    Monitor the following:  Weight  BMI  % Body Fat  Caloric intake  Labs:  lipid panel, HbA1c    Follow Up Plan:  Communication with referring healthcare provider is unnecessary at this time as patient presented as part of annual wellness exam.  However, will follow up with patient in 1-2 years.

## 2023-09-01 ENCOUNTER — TELEPHONE (OUTPATIENT)
Dept: OPTOMETRY | Facility: CLINIC | Age: 43
End: 2023-09-01
Payer: COMMERCIAL

## 2023-09-05 ENCOUNTER — HOSPITAL ENCOUNTER (OUTPATIENT)
Dept: RADIOLOGY | Facility: OTHER | Age: 43
Discharge: HOME | End: 2023-09-05
Attending: ADVANCED PRACTICE MIDWIFE
Payer: COMMERCIAL

## 2023-09-05 DIAGNOSIS — Z12.31 VISIT FOR SCREENING MAMMOGRAM: ICD-10-CM

## 2023-09-05 DIAGNOSIS — R92.30 DENSE BREAST TISSUE ON MAMMOGRAM: ICD-10-CM

## 2023-09-05 DIAGNOSIS — Z12.39 ENCOUNTER FOR SCREENING FOR MALIGNANT NEOPLASM OF BREAST, UNSPECIFIED SCREENING MODALITY: ICD-10-CM

## 2023-09-05 PROCEDURE — 77063 BREAST TOMOSYNTHESIS BI: CPT | Mod: 26,,, | Performed by: RADIOLOGY

## 2023-09-05 PROCEDURE — 77067 SCR MAMMO BI INCL CAD: CPT | Mod: 26,,, | Performed by: RADIOLOGY

## 2023-09-05 PROCEDURE — 77063 MAMMO DIGITAL SCREENING BILAT WITH TOMO: ICD-10-PCS | Mod: 26,,, | Performed by: RADIOLOGY

## 2023-09-05 PROCEDURE — 77067 SCR MAMMO BI INCL CAD: CPT | Mod: TC

## 2023-09-05 PROCEDURE — 77067 MAMMO DIGITAL SCREENING BILAT WITH TOMO: ICD-10-PCS | Mod: 26,,, | Performed by: RADIOLOGY

## 2023-10-02 DIAGNOSIS — A60.9 HSV (HERPES SIMPLEX VIRUS) ANOGENITAL INFECTION: ICD-10-CM

## 2023-10-05 RX ORDER — VALACYCLOVIR HYDROCHLORIDE 1 G/1
1000 TABLET, FILM COATED ORAL DAILY
Qty: 5 TABLET | Refills: 0 | Status: SHIPPED | OUTPATIENT
Start: 2023-10-05 | End: 2023-12-13

## 2023-12-12 ENCOUNTER — PATIENT MESSAGE (OUTPATIENT)
Dept: INTERNAL MEDICINE | Facility: CLINIC | Age: 43
End: 2023-12-12
Payer: COMMERCIAL

## 2023-12-12 DIAGNOSIS — B00.9 HSV INFECTION: Primary | ICD-10-CM

## 2023-12-13 RX ORDER — VALACYCLOVIR HYDROCHLORIDE 1 G/1
1000 TABLET, FILM COATED ORAL DAILY
Qty: 90 TABLET | Refills: 3 | Status: SHIPPED | OUTPATIENT
Start: 2023-12-13 | End: 2024-12-12

## 2023-12-13 NOTE — TELEPHONE ENCOUNTER
No care due was identified.  [facility] Embedded Care Due Messages. Reference number: [id number].   12/13/2023 7:53:27 AM CST

## 2024-01-17 ENCOUNTER — OFFICE VISIT (OUTPATIENT)
Dept: INTERNAL MEDICINE | Facility: CLINIC | Age: 44
End: 2024-01-17
Payer: COMMERCIAL

## 2024-01-17 DIAGNOSIS — D64.9 ANEMIA, UNSPECIFIED TYPE: ICD-10-CM

## 2024-01-17 DIAGNOSIS — E66.1 CLASS 2 DRUG-INDUCED OBESITY WITHOUT SERIOUS COMORBIDITY WITH BODY MASS INDEX (BMI) OF 36.0 TO 36.9 IN ADULT: ICD-10-CM

## 2024-01-17 DIAGNOSIS — Z00.00 ANNUAL PHYSICAL EXAM: ICD-10-CM

## 2024-01-17 DIAGNOSIS — F33.9 RECURRENT MAJOR DEPRESSIVE DISORDER, REMISSION STATUS UNSPECIFIED: ICD-10-CM

## 2024-01-17 DIAGNOSIS — F31.81 BIPOLAR II DISORDER: ICD-10-CM

## 2024-01-17 DIAGNOSIS — E66.09 CLASS 2 OBESITY DUE TO EXCESS CALORIES WITHOUT SERIOUS COMORBIDITY WITH BODY MASS INDEX (BMI) OF 36.0 TO 36.9 IN ADULT: Primary | ICD-10-CM

## 2024-01-17 PROCEDURE — 99215 OFFICE O/P EST HI 40 MIN: CPT | Mod: 95,,, | Performed by: STUDENT IN AN ORGANIZED HEALTH CARE EDUCATION/TRAINING PROGRAM

## 2024-01-17 PROCEDURE — 1159F MED LIST DOCD IN RCRD: CPT | Mod: CPTII,95,, | Performed by: STUDENT IN AN ORGANIZED HEALTH CARE EDUCATION/TRAINING PROGRAM

## 2024-01-17 RX ORDER — TOPIRAMATE 25 MG/1
TABLET ORAL
Qty: 60 TABLET | Refills: 1 | Status: SHIPPED | OUTPATIENT
Start: 2024-01-17 | End: 2024-05-14

## 2024-01-17 NOTE — PROGRESS NOTES
The patient location is: [facility]  The chief complaint leading to consultation is: weight gain    Visit type: audiovisual    Face to Face time with patient: 48  52 minutes of total time spent on the encounter, which includes face to face time and non-face to face time preparing to see the patient (eg, review of tests), Obtaining and/or reviewing separately obtained history, Documenting clinical information in the electronic or other health record, Independently interpreting results (not separately reported) and communicating results to the patient/family/caregiver, or Care coordination (not separately reported).         Each patient to whom he or she provides medical services by telemedicine is:  (1) informed of the relationship between the physician and patient and the respective role of any other health care provider with respect to management of the patient; and (2) notified that he or she may decline to receive medical services by telemedicine and may withdraw from such care at any time.    Notes:         [facility]    Subjective:       Patient ID: Veronique Louise is a 43 y.o. female.    Chief Complaint: Weight Loss      History was obtained from the patient and supplemented through chart review.  This patient is known to me.     HPI:    Patient is a 43 y.o. female who presents to discuss weight.    Subclinical hyperthyroidism  Was on methimazole in the past, waiting until  Endo, being followed by Dr. Manzo and fellows    Obesity 2/2 psychoactive medications  Already working on diet, exercise  Nutrition referral  Difficult with work  Considering GLP's1, discussed contraindications, R/B, etc, coverage issues  Avoiding phentermine due to vyvanse  Trial topamax with slow taper up, precautions given  Pt will consider med spa    Major depression  Later dx of bipolar, PTSD, anxiety, ADHD  Loss of child with choking incident  Went to therapy  On latuda, concerned about weight gain  Med  psychistatrist/psychologist Dr. Estevan Armendariz    Ordering annual labs as well    Not addressed    Anemia  Borderline    Hx of breast abscess   Mammo normal since then    Interested in genetics at some point?    Family hx of colon cancer in Dad age 63, late stage  C-scope 11/2022 Dr. CORAL Louise at [facility], repeat 5 years    Working full time    HSV  Valtrex 1000 mg PRN, sleep   Every 3-5 months    Medical History  Past Medical History:   Diagnosis Date    Anemia     Breast disorder     Abscess post childbirth    Gestational diabetes     Gestational with last child    HSV infection     Major depressive disorder, recurrent, unspecified 10/12/2015    Subclinical hyperthyroidism 12/19/2016       Review of Systems   Constitutional:  Positive for unexpected weight change. Negative for activity change.   HENT:  Negative for hearing loss, rhinorrhea and trouble swallowing.    Eyes:  Negative for discharge and visual disturbance.   Respiratory:  Negative for chest tightness and wheezing.    Cardiovascular:  Negative for chest pain and palpitations.   Gastrointestinal:  Negative for blood in stool, constipation, diarrhea and vomiting.   Endocrine: Negative for polydipsia and polyuria.   Genitourinary:  Positive for menstrual problem. Negative for difficulty urinating, dysuria and hematuria.   Musculoskeletal:  Negative for arthralgias, joint swelling and neck pain.   Neurological:  Negative for weakness and headaches.   Psychiatric/Behavioral:  Negative for confusion and dysphoric mood.          Surgical hx, family hx, social hx   Have been reviewed      Current Outpatient Medications:     busPIRone (BUSPAR) 10 MG tablet, Take 1 tablet (10 mg) by mouth 2 times per day, Disp: 180 tablet, Rfl: 3    busPIRone (BUSPAR) 10 MG tablet, Take 1 tablet (10 mg) by mouth 3 times per day, Disp: 90 tablet, Rfl: 3    lisdexamfetamine (VYVANSE) 30 MG capsule, Take 1 capsule (30 mg) by mouth daily in the morning, Disp: 30 capsule, Rfl: 0     lisdexamfetamine (VYVANSE) 30 MG capsule, Take 1 capsule (30 mg) by mouth daily in the morning, Disp: 30 capsule, Rfl: 0    lurasidone (LATUDA) 40 mg Tab tablet, Take 1 tablet (40 mg) by mouth daily with food, Disp: 90 tablet, Rfl: 3    lurasidone (LATUDA) 40 mg Tab tablet, Take 1 tablet (40 mg) by mouth daily with food, Disp: 30 tablet, Rfl: 3    traZODone (DESYREL) 50 MG tablet, Take 1 tablet (50 mg) by mouth daily at bedtime as needed, Disp: 90 tablet, Rfl: 3    valACYclovir (VALTREX) 1000 MG tablet, Take 1 tablet (1,000 mg total) by mouth once daily., Disp: 90 tablet, Rfl: 3    methIMAzole (TAPAZOLE) 5 MG Tab, Take 5 mg by mouth., Disp: , Rfl:     norethindrone (AYGESTIN) 5 mg Tab, Take 5 mg by mouth 3 (three) times daily., Disp: , Rfl:     topiramate (TOPAMAX) 25 MG tablet, Take 1 tablet by mouth nightly for 1 week, then twice a day for 2 weeks., Disp: 60 tablet, Rfl: 1    Objective:        There is no height or weight on file to calculate BMI.  Vitals:    01/17/24 1459   PainSc: 0-No pain       Physical Exam  Vitals and nursing note reviewed.   Constitutional:       General: She is not in acute distress.     Appearance: Normal appearance. She is not ill-appearing.   HENT:      Head: Normocephalic and atraumatic.   Eyes:      General: No scleral icterus.  Pulmonary:      Effort: Pulmonary effort is normal.   Musculoskeletal:         General: No deformity.   Neurological:      Mental Status: She is alert and oriented to person, place, and time.   Psychiatric:         Behavior: Behavior normal.           Lab Results   Component Value Date    WBC 8.82 05/02/2023    HGB 11.5 (L) 05/02/2023    HCT 35.9 (L) 05/02/2023     05/02/2023    CHOL 188 07/26/2022    TRIG 66 07/26/2022    HDL 64 07/26/2022    ALT 20 05/02/2023    AST 17 05/02/2023     05/02/2023    K 3.8 05/02/2023     05/02/2023    CREATININE 0.7 05/02/2023    BUN 9 05/02/2023    CO2 29 05/02/2023    TSH 0.144 (L) 08/10/2023    HGBA1C 5.2  05/02/2023       The 10-year ASCVD risk score (Aga WESTFALL, et al., 2019) is: 0.4%    Values used to calculate the score:      Age: 43 years      Sex: Female      Is Non- : Yes      Diabetic: No      Tobacco smoker: No      Systolic Blood Pressure: 126 mmHg      Is BP treated: No      HDL Cholesterol: 64 mg/dL      Total Cholesterol: 188 mg/dL    (Imaging have been independently reviewed)    Reviewed mammo    Assessment:         1. Class 2 obesity due to excess calories without serious comorbidity with body mass index (BMI) of 36.0 to 36.9 in adult    2. Annual physical exam    3. Anemia, unspecified type    4. Class 2 drug-induced obesity without serious comorbidity with body mass index (BMI) of 36.0 to 36.9 in adult    5. BMI 36.0-36.9,adult    6. Bipolar II disorder    7. Recurrent major depressive disorder, remission status unspecified            Plan:     Veronique was seen today for weight loss.    Diagnoses and all orders for this visit:    Class 2 obesity due to excess calories without serious comorbidity with body mass index (BMI) of 36.0 to 36.9 in adult  -     Ambulatory Referral/Consult to Lifestyle Nutrition; Future    Annual physical exam  -     CBC Without Differential; Future  -     Comprehensive Metabolic Panel; Future  -     Hemoglobin A1C; Future  -     Lipid Panel; Future  -     TSH; Future    Anemia, unspecified type  -     Vitamin B12; Future  -     Ferritin; Future    Class 2 drug-induced obesity without serious comorbidity with body mass index (BMI) of 36.0 to 36.9 in adult  -     topiramate (TOPAMAX) 25 MG tablet; Take 1 tablet by mouth nightly for 1 week, then twice a day for 2 weeks.    BMI 36.0-36.9,adult    Bipolar II disorder    Recurrent major depressive disorder, remission status unspecified          Health Maintenance  - Lipids: normal 11/2022  - A1C: 5.3 normal but repeating  - Colon Ca Screen: C-scope 11/2022 Dr. CORAL Louise at [facility], repeat 5 years  -  Immunizations:    Women's health  - Pap: HPV neg 2018, due July  - Mammo: Birads 1 9/2/2022  - Dexa: na  - Contraception: s/p salpingectomy     All of your core healthy metrics are met.      Follow up in about 3 months (around 4/17/2024). or sooner prn        55 min were used in chart review, evaluation and counseling of patient, documentation and review of results on same day of service     All medications were reviewed including potential side effects and risks/benefits.  Pt was counseled to call back if anything worsens or if questions arise.        González Alves MD  Family Medicine  [facility]  [address]  [address]  [place] [address]  Phone [phone]  Fax [phone]

## 2024-01-31 ENCOUNTER — PATIENT MESSAGE (OUTPATIENT)
Dept: NUTRITION | Facility: CLINIC | Age: 44
End: 2024-01-31

## 2024-01-31 ENCOUNTER — CLINICAL SUPPORT (OUTPATIENT)
Dept: NUTRITION | Facility: CLINIC | Age: 44
End: 2024-01-31
Payer: COMMERCIAL

## 2024-01-31 DIAGNOSIS — Z71.3 NUTRITIONAL COUNSELING: Primary | ICD-10-CM

## 2024-01-31 DIAGNOSIS — E66.09 CLASS 2 OBESITY DUE TO EXCESS CALORIES WITHOUT SERIOUS COMORBIDITY WITH BODY MASS INDEX (BMI) OF 36.0 TO 36.9 IN ADULT: ICD-10-CM

## 2024-01-31 PROCEDURE — 97802 MEDICAL NUTRITION INDIV IN: CPT | Mod: 95,,, | Performed by: NUTRITIONIST

## 2024-01-31 NOTE — PROGRESS NOTES
"Nutrition Assessment    Visit Type: Insurance initial  Session Time:  2 Hours  Reason for MNT visit: Pt in for education and nutrition counseling regarding Obesity.     The patient location is: her home  The chief complaint leading to consultation is: wanting to lose weight and prevent getting Diabetes since Diabetes runs in her family    Visit type: audiovisual    Face to Face time with patient: 90  120 minutes of total time spent on the encounter, which includes face to face time and non-face to face time preparing to see the patient (eg, review of tests), Obtaining and/or reviewing separately obtained history, Documenting clinical information in the electronic or other health record, Independently interpreting results (not separately reported) and communicating results to the patient/family/caregiver, or Care coordination (not separately reported).     Each patient to whom he or she provides medical services by telemedicine is:  (1) informed of the relationship between the physician and patient and the respective role of any other health care provider with respect to management of the patient; and (2) notified that he or she may decline to receive medical services by telemedicine and may withdraw from such care at any time.    Age: 43 y.o.  Wt: 220 pounds  Wt Readings from Last 1 Encounters:   08/16/23 96.5 kg (212 lb 11.9 oz)     Ht: 5'4"  Ht Readings from Last 1 Encounters:   08/16/23 5' 4" (1.626 m)     BMI: 37.7  BMI Readings from Last 1 Encounters:   08/16/23 36.52 kg/m²       Client states:  She wants to lose weight and prevent getting Diabetes. Diabetes runs in her family. Veronique works from home and has two kids ages 8 and 13 years of age. She has a sweet tooth and wants to balance her likes with her meal plan    Medical History  Problem List             Resolved    Family history of colon cancer         Subclinical hyperthyroidism         Hypocalcemia         Status post bilateral salpingectomy         " Major depressive disorder, recurrent, unspecified         BMI 36.0-36.9,adult         Bipolar II disorder            Past Medical History:   Diagnosis Date    Anemia     Breast disorder     Abscess post childbirth    Gestational diabetes     Gestational with last child    HSV infection     Major depressive disorder, recurrent, unspecified 10/12/2015    Subclinical hyperthyroidism 12/19/2016       Past Surgical History:   Procedure Laterality Date    COLONOSCOPY N/A 11/30/2022    Procedure: COLONOSCOPY;  Surgeon: Albin Louise MD;  Location: [address]);  Service: Endoscopy;  Laterality: N/A;  pt requested S[facility]ab  10/26 instructions to portal-st    EXPLORATORY LAPAROTOMY      AFTER UTERINE PERF    HYSTEROSCOPY      IUD REMOVAL WITH PERF    INCISION AND DRAINAGE OF BREAST Left 2015    INTRAUTERINE DEVICE INSERTION  2011    MIRENA / PARAGAURD     LAPAROSCOPIC SALPINGECTOMY Bilateral 12/26/2019    Procedure: SALPINGECTOMY, LAPAROSCOPIC;  Surgeon: Petr Foley Jr., MD;  Location: [facility];  Service: OB/GYN;  Laterality: Bilateral;    TONSILLECTOMY, ADENOIDECTOMY      TUBAL LIGATION            Medications    Prior to Admission medications    Medication Sig Start Date End Date Taking? Authorizing Provider   busPIRone (BUSPAR) 10 MG tablet Take 1 tablet (10 mg) by mouth 2 times per day 7/8/22      busPIRone (BUSPAR) 10 MG tablet Take 1 tablet (10 mg) by mouth 3 times per day 9/5/23      lisdexamfetamine (VYVANSE) 30 MG capsule Take 1 capsule (30 mg) by mouth daily in the morning 9/5/23      lisdexamfetamine (VYVANSE) 30 MG capsule Take 1 capsule (30 mg) by mouth daily in the morning 10/13/23      lurasidone (LATUDA) 40 mg Tab tablet Take 1 tablet (40 mg) by mouth daily with food 10/28/22      lurasidone (LATUDA) 40 mg Tab tablet Take 1 tablet (40 mg) by mouth daily with food 9/5/23      methIMAzole (TAPAZOLE) 5 MG Tab Take 5 mg by mouth.    Provider, Historical   norethindrone (AYGESTIN) 5 mg Tab Take 5 mg by  mouth 3 (three) times daily. 9/7/22   Provider, Historical   topiramate (TOPAMAX) 25 MG tablet Take 1 tablet by mouth nightly for 1 week, then twice a day for 2 weeks. 1/17/24   González Alves MD   traZODone (DESYREL) 50 MG tablet Take 1 tablet (50 mg) by mouth daily at bedtime as needed 7/8/22      valACYclovir (VALTREX) 1000 MG tablet Take 1 tablet (1,000 mg total) by mouth once daily. 12/13/23 12/12/24  González Alves MD        Vitamins, Minerals, and/or Supplements:  Prenatal      Food Allergies or Intolerances:  NKFA     Social History    Marital status:      Social History     Tobacco Use    Smoking status: Never    Smokeless tobacco: Never   Substance Use Topics    Alcohol use: Yes     Alcohol/week: 1.0 standard drink of alcohol     Types: 1 Drinks containing 0.5 oz of alcohol per week     Comment: Occasionally.     Current Alcohol use: Yes. 1 drink on Saturday nights. Either a Hi Noon or a vodka soda cocktail    Lab Reports   Reviewed and noted    Lab Results   Component Value Date    TSH 0.144 (L) 08/10/2023    FREET4 1.00 08/10/2023    AST 17 05/02/2023    ALT 20 05/02/2023    HDL 64 07/26/2022    LDLCALC 110.8 07/26/2022    TRIG 66 07/26/2022    HGBA1C 5.2 05/02/2023    HGBA1C 5.3 07/26/2022         BP Readings from Last 1 Encounters:   08/16/23 126/80        24-hour Recall    Food choices (After Midnight)  Patient eating midnight to 4am: (P) Never         Food choices (Early Morning)  Patient eats 4am to 8am: (P) Never         Food choices (Morning)  Patient eats 8am to noon: (P) Never       Food choices (Afternoon)  Patient eats noon to 4pm: (P) Once or twice a week   Home cooked meals (Noon - 4pm): (P) greek yogurt or egg with cheese; coffee with A LOT of half and half   Snacks (Noon to 4pm): (P) glazed pecans, sylvia chips     Food choices (Evening)   Patient eats 4pm to 8pm: (P) Several times a week   Home cooked meals (4pm - 8pm): (P) pasta with chicken sausage       Snacks (4pm -  8pm): (P) aurelia truffle balls, Lilliana kisses, cookies     Food choices (Late evening)  R [facility] PEQ NUTRITION HOW OFTEN EATING LATE EVENING: (P) Every day       Fast food meals (8pm - midnight): (P) [employer] cheese enchiladas   Snacks (8pm - midnight: (P) potato chips      Beverages  How much water consumed (per day?): (P) 7   Cups of milk consumed (per day?): (P) 1   Types of milk: (P) Whole milk   Cups of  juice consumed (per day?): (P) 0   Number of supplement shakes (per day?): (P) 0       Number of cups of coffee (per day?): (P) 4   Coffee: (P) Caffinated, Cream, Sugar   Tea:: (P) Milk/Cream   Cups of soda consumed (per day?): (P) 0   Other non-alcoholic beverages consumed (per day?): (P) 0          LIFESTYLE FACTORS    Dinning out: 1-2 x per week    Meal preparation/shopping: Who does the grocery shopping:: (P) Me Who prepares the meals: (P) Me     Sleep: well    Stress Level: moderate    Support System:  spouse    Exercise Regimen: lightly active (low intensity, 1-3 days a week)      Diagnosis    Inadequate energy intake related to skipping some meals and snacks as evidenced by 24 hour recall.      Intervention    Estimated Energy Requirements:   Calories: 1900  Protein: 130-160 grams  Carbohydrates: ~140 grams  Fats: ~90 grams; Choose plant-based heart healthy fats  Total fluid: 110 oz + sweat loss  Limit added sugar to 20 grams or less per day (Note: 1 teaspoon of sugar = ~5 grams)    Recommendations & Goals:  Patient goals and recommendations are tailored to the specific patient's needs, readiness to change, lifestyle, culture, skills, resources, & abilities. Strategies to help achieve these nutrition-related goals were discussed which can include but are not limited to SMART goal setting & mindful eating.     Aim for a minimum of 7 hours sleep   Exercise 60 minutes most days  Eat breakfast within 1-2 hours of waking up  Try not to skip any meals or snacks, not going more than 3-4 hours without eating   At  each meal and snack, try to include a source of fiber + lean protein + healthy fat     Written Materials Provided  These resources are intended to assist the patient in making it easier to choose recommended options when eating out & to identify better-for-you brands at the grocery store:    Meal Planning Guide with recommendations discussed along with portion sizes and a customized meal plan   Fueling Well On-the-Go Food Guide  Eat Fit Shopping Guide  Lifestyle Nutrition Meal Guide  RD contact information    Goals:  Initial goal is to retrain your body to help boost your metabolism by eating (or drink protein) every 3-4 hours. A snack is needed between meals where you are going >4 hours (Setting alarms on your phone may initially help as a reminder)  Food is fuel for your body  Eating often helps boost metabolism  Helps preserve lean muscle  Helps with portion control  Increase exercise/movement à  Run at least 3 days per week for 35-45 minutes. Add 2 days per week of strength training for at least 20 minutes.    -Reminder: Even an additional 15-20-minute daily walk helps you stay consistent and adds up in the long run  Always have a lean protein and/or healthy fat with a carbohydrate source  Increase water intake  Deep fried foods: Enjoy at most twice a month. If initially having 1x/week, then get only 1 item fried, and the other not fried (Ex: Small bledsoe + grilled chicken sandwich)  Focus on understanding at most how many carbohydrate servings I recommend for each meal and snack with dinner being lower in carbs. The remainder of your plate will consist of lean protein and non-starchy veggies  Portion Control:    -Measure and/or weight your recommended (cooked) portions when you start your meal plan. See what each portion looks like on your plate, then eyeball portions for future meals      Monitoring/Evaluation    Monitor the following:  Weight  Sleep  Stress Management  Movement  Nutrient intake in reference to  meal plan    Communicated with healthcare provider and documented plan for referral to appropriate agency/healthcare provider as needed    Supervising Physician: Tyler Alcantar MD    Patient motivation, anticipated barriers, expected compliance: Patient is motivated and has verbalized understanding and intent to comply.     Comprehension: good     Follow-up: 6 weeks

## 2024-02-01 NOTE — PATIENT INSTRUCTIONS
Name: Veronique Louise  Date: 1.31.2024  Nutrition protocol    Daily Energy Requirements:  Calories: 1900  Protein: 130-160 grams  Carbohydrates: ~140 grams  Fats: ~90 grams; Choose plant-based heart healthy fats  Total fluid: 110 oz + sweat loss  Limit added sugar to 20 grams or less per day (Note: 1 teaspoon of sugar = ~5 grams)    Goals:  Initial goal is to retrain your body to help boost your metabolism by eating (or drink protein) every 3-4 hours. A snack is needed between meals where you are going >4 hours (Setting alarms on your phone may initially help as a reminder)  Food is fuel for your body  Eating often helps boost metabolism  Helps preserve lean muscle  Helps with portion control  Increase exercise/movement à  Run at least 3 days per week for 35-45 minutes. Add 2 days per week of strength training for at least 20 minutes.    -Reminder: Even an additional 15-20-minute daily walk helps you stay consistent and adds up in the long run  Always have a lean protein and/or healthy fat with a carbohydrate source  Increase water intake  Deep fried foods: Enjoy at most twice a month. If initially having 1x/week, then get only 1 item fried, and the other not fried (Ex: Small bledsoe + grilled chicken sandwich)  Focus on understanding at most how many carbohydrate servings I recommend for each meal and snack with dinner being lower in carbs. The remainder of your plate will consist of lean protein and non-starchy veggies  Portion Control:    -Measure and/or weight your recommended (cooked) portions when you start your meal plan. See what each portion looks like on your plate, then eyeball portions for future meals    Coffee: Don't add any sugar and keep your half-n-half for now. Eventually do ½ you portion of half-n-half and the remaining half Fairlife whole milk. Add 2 tablespoons Vital Proteins Collagen Peptides (unflavored)    Breakfast: 2 servings of carbohydrates = 30-40 grams + at least 20 grams lean  protein/heart healthy fat  Smoothie: Refer below under 'Building a Better Smoothie where you will find your recommended portions  1 small muffin at most 3 times per week + one of the following protein/fat combinations:  2 whole eggs + 2 tablespoons cheese  Greek yogurt protein drink (see brands options below) + 2 tablespoons nuts   Protein coffee: 4 tablespoons Vital Proteins Collagen Peptides 'frothed' in your coffee + 3 tablespoons nuts of your choice + 1 cup fruit  On-The-Go: Frozen Breakfast sandwich (see brands below)    Snack: A snack is recommended if going >3-4 hours between meals      Lunch: 4 oz lean protein + any non-starchy veggies + 2 servings of carbohydrates = 30-40 grams  Examples of 2 servings of carbohydrates = ~30-40 grams:  1.5 cups cooked lentil pasta -or- 1 cup cooked whole wheat pasta; 2/3 cup cooked brown rice; 1 medium potato or sweet potato (5-6 oz in weight); 1 piece of fruit + 2 thin slices of bread; 2 slices 100-calorie bread; ½ cup corn + ½ cup cooked mashed potatoes; 1 cup cooked beans; ½ cup cooked beans + 1/3 cup cooked brown rice, etc  The rest of your plate will consist of 4 oz lean protein + at least 1 cup cooked non-starchy veggies  Lunch Option Examples:  Salad: Unlimited non-starchy veggies + 4 oz lean protein + 2 salad add-ins (see notes below) + 1 cup fruit  [place]: 2 slices Sathya's Killer 21 whole grains & seeds regular sliced bread + 4 oz freshly sliced ham + 1 tablespoon regular mayonnaise + 1 slice of cheese -or- 2 tablespoons glazed pecans + non-starchy veggies of your choice  Note: If you want a piece of fruit -or- 1 serving of whole grain chips -or- 1 serving of Trisha chips with your sandwich, then do 2 slices Sathya's Killer thin-sliced bread instead of regular sliced bread  Spaghetti & meat sauce: 1.5 cups cooked red lentil pasta -or- 1 cup cooked whole wheat pasta + 4 oz chicken sausage + side of non-starchy veggies  Mini Meal if not too hungry: 1 Chobani Complete  Greek yogurt drink + 1/3 cup nuts + piece of fruit -or- 1 serving of Trisha chips  Soups: 1 cup cooked pea soup + 4-5 tablespoons Collagen Peptides added to soup + side of non-starchy veggies  Red Beans & Rice: ½ cup cooked beans + 1/3 cup cooked brown rice + additional 2 oz lean protein (ex: chicken sausage, ham, chicken, etc) + side of non-starchy veggies  If you don't want rice, then you can do 1 cup cooked beans over riced cauliflower + additional 3 oz lean protein (ex: chicken sausage, ham, chicken, etc) + side of non-starchy veggies  4 oz lean protein + at least 1 cup cooked non-starchy veggies + 1 medium baked potato (5 oz in weight)  Frozen Meals: See guidelines and brand examples below. More brand examples found on Eat Fit Shopping List.   -Add additional 2-3 oz lean protein to the frozen meal or on the side  Restaurants: See tips below, particularly the Pick 1 out of the 4 rule as well as info on Eat Fit restaurant partners  Fast Food  Convenient Options: Refer to Fueling Well On-The-Go Guide       Snack: ~1 serving of carbohydrates = 15-25 grams + at least 15 grams lean protein/heart healthy fat + any non-starchy vegetables  Quest Chocolate Chip Protein Cookie + piece of fruit  1 regular cookie + Chobani Complete Greek yogurt drink  Flavored Greek Yogurt (see brand examples below) + 2 tablespoons nuts -or- low sugar granola  [place] balance break + piece of fruit  1 protein frozen waffle (see brands below) topped with 1 tablespoon nut butter  ¼ cup nuts + piece of fruit  Piece of fruit + 2 tablespoons regular nut butter  Protein Bar (see brand examples below) + piece of fruit  Avocado toast: ½ small Seaman avocado on 1 slice 100% whole grain toast + piece of fruit      Dinner Mini Meal #1: Lower carbohydrate à ~ 1 serving of carbohydrates = 15-20 grams = ~½ cup cooked whole grain starch -or- 1 piece of fruit  Remainder of your plate will consist of 3-4 oz lean protein + 1 cup or more non-starchy veggies  cooked in ~1 tablespoon heart healthy fat (Avocado Oil, Extra Virgin Olive Oil)  If having Pea soup, do ~1/2 cup pea soup + 2 tablespoons Collagen Peptides + additional 2 oz lean protein on the side + side of non-starchy veggies  Carb Swaps if Desired:  Hearts of Palm-Based 'Linguini'  'Riced' Cauliflower  'Riced' Broccoli  Cauliflower Mash   Spaghetti Squash  Zoodles  Spiralized Beets   Low Carb Breads (See brands below)  Think outside the box for low carb dishes:  Kabobs, stir bledsoe made with riced cauliflower or riced broccoli, stuffed bell peppers with no rice, lettuce wraps, eggplant lasagna, shrimp etouffee made with riced cauliflower, request a sushi roll that contains raw fish to be made without rice, etc  Quest frozen pizza à low carb + high protein frozen pizza; Add additional side of non-starchy veggies    Dinner Mini Meal #2: 350 calorie meal to take with your medicine:  Smoothie: 1 scoop Ascent 100% whey protein powder + ½ cup fruit + 1.5 tablespoons healthy fat (PB, avocado oil, ¼ Seaman avocado, etc) mixed 8 oz Fairlife whole milk  Better-For-You Charcuterie Board: 1 oz turkey pastrami + 1 oz turkey pepperoni + 1 oz salami + 1 oz cheese + 1 serving of crackers     Optional Sweet Tooth You Can Have at Any time of the Day: Anything up to 150 calories  'Fun size'      Restaurant Tips    Pick 1 out of the 4 Rule: Instead of eating bread/tortilla chips, an appetizer, alcoholic drink, and dessert, choose just one to have with your entrée   Focus on lean cuts of protein: Refer to lean meat/meat substitutes page in meal planning guidebook  Select items grilled, baked, broiled, braised, poached, or roasted       For your heart health, avoid crispy, crunchy, breaded, paneed or stuffed items and items that are cream based, au gratin or buttered       Order sauces, dressings, and gravies on the side. This way you can add 1-2 tablespoons yourself. This helps with portion control      You can request  double non-starchy vegetables instead of a starchy side dish. If the starchy side is something you love, consider splitting it with someone else at the table      Beverages: Order water with lemon, sparkling water, or unsweetened tea. Avoid sugary beverages such as soft drinks, juices, and mixers   Deep fried foods: Enjoy no more than 2x/month         Dining Out   -[facility]   Designed to take the guesswork out of dining out healthfully, [facility] makes the healthy choice the easy choice  Visit [email or web address] Eat Fit Cookbook  Craft: The Eat Fit Guide to Zero Proof Cocktails  Download the [facility] Eat [facility] neema for free on your smartphone  List of all Eat Fit restaurant partners & dishes by location  Nutrition facts included for all Eat Fit dishes  200+ Eat Fit approved recipes  Eat Fit shopping guide + community wellness resources        Building A Better Smoothie   Protein: Choose one of the following protein options   Plain Greek Yogurt: ~6 oz Nonfat or 2%   Plant-Based Protein Powder: 1.5-2 scoops  [facility]   VR1 [facility] RAW   Truvani  Whey Protein Powder: 1.5-2 scoops  Ascent Protein Powder (This is the brand you are currently doing)  Collagen Peptides: ~8 tablespoons (4 tablespoons = 20 grams protein)  Vital Proteins Collagen Peptides, unflavored  Organic Grass Fed Collagen Peptides  Make it Sweet:   Add ~1 cup fresh -or- No Sugar Added  Unsweetened frozen fruit    Add your Veggies:   Instead of ice, you can use frozen plain cauliflower florets    Cauliflower helps with the detoxification process in our bodies, and it's virtually tasteless!   Greens: spinach or kale  Beets are a great addition to smoothies, especially paired with strawberries and lemon   Cucumbers and celery are refreshing ways to enhance nutrition and hydration within your smoothie   Healthy Fats: add one of the following plant-based fats:    Nut Butter: 1 tablespoon for a snack; 2 tablespoons for a  meal  Natural Peanut Butter   Ronda Butter   Cashew Butter   Avocado (¼ Seaman for a snack, ½ Seaman for a meal)   Avocado Oil  Extra Virgin Olive Oil: 1 tablespoon for a snack; 2 tablespoons for a meal  Add a liquid to reach your desired consistency:   Unsweetened/No Sugar Added Plant-Based Milk Alternative:    Ronda, Hemp, Cashew or Coconut  Milk: Skim, Reduced Fat  Healthy add-ins for an extra nutritional boost:   1 tablespoon Flaxseeds -or- James Seeds          Ordering A Better-For-You Salad   Include a lean protein, any amount of non-starchy vegetables, and a small amount of fat   Order salad dressings on the side to better control portion sizes   Add ~2-3 tablespoons yourself to lightly coat   Pick 2 salad add-ins, each being ~2 tablespoons:   Dressing   Cheese   NutsSeeds   AvocadoGuacamole         Additional Nutrition Tips     - Dieter #7 Rule: Guidelines for food brands found in the aisles of grocery stores   Look for brands that contain < 7 grams added sugar and  > 7 grams protein      -Frozen Meal Guidelines:    ~45 grams or less carbohydrates & at least 20 grams protein   Note: If you find a brand you enjoy that doesn't provide 20 grams protein, you can add leftover lean protein to the frozen meal   See notes below for several brand examples   Refer to the Eat Fit Shopping Guide for additional brand specific recommendations      -Portion Control:   Measure and/or weigh your recommended (cooked) portions when you start your meal plan   See what each portion looks like on your plate, then eyeball portions for future meals and snacks      -Limit the following as these can be inflammatory to our body and make us feel less energized:   Added sugar   White, refined, processed carbohydrates   Alcohol   Deep fried foods     -Michael Cake: 1 slice = size of the diameter of a quarter = 100 calories    -If you're having trouble finding a specific food brand, try looking on the brands website. Most companies have a  'store   find a store' on their website          Better-For-You Food Brands      - Whole Grain Breads:   Sathya's Killer Bread - 21 Whole Grain & Seeds, PowerSeed, GoodSeed  Thin sliced -or- Regular Slice   Recommended to keep refrigerated   Look for 100% whole wheat/whole grain bread options  Low Carb Breads: Freezer Aisle  Base Culture 7 Nut & Seed   Carbonaut: Seeded Low Carb Keto Bread  Unbun: Low Carb Buns, Breads & Bagels     -Whole Grain Tortillas  Wraps:   Corn    Siete: Grain Free Tortillas  Alonso Shasha 100% Whole Wheat Tortillas  La Tortilla Factory Low Carb Whole Wheat Tortillas     -High Protein Pasta's:   Red lentil  Yellow Lentil Pasta   Black Bean Pasta   Edamame-Based Pasta   Barilla: Chickpea & Red Lentil Pasta     -Rice:   Brown Rice  10-minute boil in a bag is great for convenience  Banza Chickpea Rice   RightRice: Made from Vegetables  RightRice   Risotto    -Grab & Go Protein Muffin:   FlapJacked: Mighty Muffin [found on baking aisle]      -Frozen Waffles:   Banza Protein Waffles  Kashi GO Protein Waffles ([employer] only)  Van's Power Grains Protein Waffles   Birch Benders Protein Toaster Waffles     -Frozen Breakfast Sandwiches  Bowls for On-The-Go:   Barney Arteaga' on English Muffin   Barney Arteaga' Egg'Wich- The Breadless Breakfast   Barney Hardy Egg Bites   GoodFood- Egg White Lisa Breakfast [place]  EVOL- Morning Bowls  Lean and Fit Options   [place] Egg White Omelets       -Whole Grain Chips:   SunChips   Beanito's  Beanfields Bean Chips   PopCorners FLEX Protein Crisps     Lesser Evil: Power Curls  Siete Grain Free Tortilla Chips     - Protein Chips:   iwon Organics Protein Stix  Protein Puffs   Quest   Flynn Life Tortilla Style Chips     -Whole Grain Crackers:   Triscuit Thin Crisps  [place] Nut Thins   Kenia's Gone Crackers   Crunchmaster Protein Sea Salt Cracker   Whisps: Parmesan Crisps     -Protein Bars:   KIND Protein   Rx Bar   Rx Layers  Bar  Bulletproof Collagen Protein Bar  Oatmega Bars   :ratio KETO Friendly Crunchy Bar      -Ready-to-Drink Protein Drinks:   PopSeal CORE POWER Elite 42-gram Protein Drink (Top pick)  Fairlife 30-gram Protein Drink   Iconic Grass-Fed Protein Drink   Orgain Clean Plant-Based Protein Drink   OWYN Plant-Based Protein Drink       -Milk:  Fairlife (contains half the sugar and double the protein compared to regular brands, and it lasts ~2 months in your fridge    -Frozen Meals - Single Servings:   Healthy Choice:  Max Bowls  Power Bowls   Zero Bowls  Realgood Co: Real Enchilada's   Simply Line of Healthy Choice Café Steamers  Quest Thin Crust Pizza - Low Carb  Banza Roasted Veggie Pizza  Christi's Kitchen Tofu Scramble with Veggies and Hashbrowns     -Frozen Meals  Bulk Low Carb Options:   Soren'flour Foods: Keto Lasagna   Realgood: Low Carb Lightly Breaded Chicken Strips & Nuggets   Nature's Intent: Baked Cauli & Cheese - [employer] only  Just Bare: Lightly Breaded Chicken Breast Strips & Bites -[employer] only     -Gallo's Natural Foods:  Heat and Eat Entrees    Located in refrigerated section of most grocery stores  Keto Cauli Mac-n-Cheese  Found at select [employer] locations in refrigerated section  - Flavored Greek Yogurt:   Oikos Pro 20g Protein Greek Yogurt  Oikos Triple Zero Greek Yogurt  :ratio 25g Protein Greek Yogurt  Two Good - All varieties      -Greek Yogurt Protein Drinks:  Chobani Complete (20 grams protein)  Two Good Smoothies (You will need two of these)  POWERFUL Protein Shake Made with Greek Yogurt     - Red Sauce in a Jar:   Jori & Lynette's Heart Smart Sauce    Classico Original   Engine 2 Plant-Strong   Clark's Homemade Red Sauces    -Better-For-You Ice Cream  Ice Cream Bars:   Halo Top Ice Cream  Enlightened 'Light' Ice Cream   KETO: Pint Ice Cream Bars   Rebel Ice Cream  Yasso Frozen Greek Yogurt Bar     -Lili's Sweets: 70% Cocoa Dark Chocolate  -Hu: 70% Cocoa Dark Chocolate Bar    -Collagen Peptides:  Great pantry  staple: can be added to soups, hummus, coffee, etc to make higher protein   Favorite Brands:  Vital Proteins Collagen Peptides, Unflavored (top pick)  Organic Grass Fed Collagen Peptides    -Supplements:   Vitamin D3: 2,000 international units per day    Fish Oil: Look for at least 1200 mg EPA + DHA per 2 capsules   Nordic Naturals: Ultimate Omega   Prenatal             If you need to reschedule a nutrition follow-up appointment, please call [facility] at [phone]

## 2024-02-23 ENCOUNTER — PATIENT MESSAGE (OUTPATIENT)
Dept: ENDOCRINOLOGY | Facility: CLINIC | Age: 44
End: 2024-02-23
Payer: COMMERCIAL

## 2024-03-14 ENCOUNTER — HOSPITAL ENCOUNTER (OUTPATIENT)
Dept: RADIOLOGY | Facility: HOSPITAL | Age: 44
Discharge: HOME | End: 2024-03-14
Attending: STUDENT IN AN ORGANIZED HEALTH CARE EDUCATION/TRAINING PROGRAM
Payer: COMMERCIAL

## 2024-03-14 DIAGNOSIS — E05.90 SUBCLINICAL HYPERTHYROIDISM: ICD-10-CM

## 2024-03-14 PROCEDURE — 78014 THYROID IMAGING W/BLOOD FLOW: CPT | Mod: 26,,, | Performed by: STUDENT IN AN ORGANIZED HEALTH CARE EDUCATION/TRAINING PROGRAM

## 2024-03-14 PROCEDURE — A9516 IODINE I-123 SOD IODIDE MIC: HCPCS | Performed by: STUDENT IN AN ORGANIZED HEALTH CARE EDUCATION/TRAINING PROGRAM

## 2024-03-14 PROCEDURE — 78014 THYROID IMAGING W/BLOOD FLOW: CPT | Mod: TC

## 2024-03-14 RX ORDER — SODIUM IODIDE I 123 200 UCI/1
318 CAPSULE, GELATIN COATED ORAL
Status: COMPLETED | OUTPATIENT
Start: 2024-03-14 | End: 2024-03-14

## 2024-03-14 RX ADMIN — SODIUM IODIDE I 123 318 MICROCI: 200 CAPSULE, GELATIN COATED ORAL at 08:03

## 2024-03-15 ENCOUNTER — HOSPITAL ENCOUNTER (OUTPATIENT)
Dept: RADIOLOGY | Facility: HOSPITAL | Age: 44
Discharge: HOME | End: 2024-03-15
Attending: STUDENT IN AN ORGANIZED HEALTH CARE EDUCATION/TRAINING PROGRAM
Payer: COMMERCIAL

## 2024-03-26 ENCOUNTER — CLINICAL SUPPORT (OUTPATIENT)
Dept: NUTRITION | Facility: CLINIC | Age: 44
End: 2024-03-26
Payer: COMMERCIAL

## 2024-03-26 DIAGNOSIS — Z71.3 NUTRITIONAL COUNSELING: ICD-10-CM

## 2024-03-26 PROCEDURE — 97803 MED NUTRITION INDIV SUBSEQ: CPT | Mod: 95,,, | Performed by: NUTRITIONIST

## 2024-03-26 NOTE — PROGRESS NOTES
"Nutrition Assessment    Visit Type: Insurance follow-up/established patient  Session Time:  30 minutes  Reason for MNT visit: Pt in for education and nutrition counseling regarding Obesity.     The patient location is: her home  The chief complaint leading to consultation is: wanting to lose weight and prevent getting Diabetes since Diabetes runs in her family    Visit type: audiovisual    Face to Face time with patient: 25  30 minutes of total time spent on the encounter, which includes face to face time and non-face to face time preparing to see the patient (eg, review of tests), Obtaining and/or reviewing separately obtained history, Documenting clinical information in the electronic or other health record, Independently interpreting results (not separately reported) and communicating results to the patient/family/caregiver, or Care coordination (not separately reported).     Each patient to whom he or she provides medical services by telemedicine is:  (1) informed of the relationship between the physician and patient and the respective role of any other health care provider with respect to management of the patient; and (2) notified that he or she may decline to receive medical services by telemedicine and may withdraw from such care at any time.    Age: 43 y.o.  Wt: 220 pounds  Wt Readings from Last 1 Encounters:   08/16/23 96.5 kg (212 lb 11.9 oz)     Ht: 5'4"  Ht Readings from Last 1 Encounters:   08/16/23 5' 4" (1.626 m)     BMI: 37.7  BMI Readings from Last 1 Encounters:   08/16/23 36.52 kg/m²       Client states:      3.26.2024 follow-up:     1.31.2024 initial appt: She wants to lose weight and prevent getting Diabetes. Diabetes runs in her family. Veronique works from home and has two kids ages 8 and 13 years of age. She has a sweet tooth and wants to balance her likes with her meal plan    Medical History  Problem List             Resolved    Family history of colon cancer         Subclinical " hyperthyroidism         Hypocalcemia         Status post bilateral salpingectomy         Major depressive disorder, recurrent, unspecified         BMI 36.0-36.9,adult         Bipolar II disorder            Past Medical History:   Diagnosis Date    Anemia     Breast disorder     Abscess post childbirth    Gestational diabetes     Gestational with last child    HSV infection     Major depressive disorder, recurrent, unspecified 10/12/2015    Subclinical hyperthyroidism 12/19/2016       Past Surgical History:   Procedure Laterality Date    COLONOSCOPY N/A 11/30/2022    Procedure: COLONOSCOPY;  Surgeon: Albin Louise MD;  Location: [address]);  Service: Endoscopy;  Laterality: N/A;  pt requested Sutab  10/26 instructions to portal-st    EXPLORATORY LAPAROTOMY      AFTER UTERINE PERF    HYSTEROSCOPY      IUD REMOVAL WITH PERF    INCISION AND DRAINAGE OF BREAST Left 2015    INTRAUTERINE DEVICE INSERTION  2011    MIRENA / PARAGAURD     LAPAROSCOPIC SALPINGECTOMY Bilateral 12/26/2019    Procedure: SALPINGECTOMY, LAPAROSCOPIC;  Surgeon: Petr Foley Jr., MD;  Location: [facility];  Service: OB/GYN;  Laterality: Bilateral;    TONSILLECTOMY, ADENOIDECTOMY      TUBAL LIGATION            Medications    Prior to Admission medications    Medication Sig Start Date End Date Taking? Authorizing Provider   busPIRone (BUSPAR) 10 MG tablet Take 1 tablet (10 mg) by mouth 2 times per day 7/8/22      busPIRone (BUSPAR) 10 MG tablet Take 1 tablet (10 mg) by mouth 3 times per day 9/5/23      lisdexamfetamine (VYVANSE) 30 MG capsule Take 1 capsule (30 mg) by mouth daily in the morning 9/5/23      lisdexamfetamine (VYVANSE) 30 MG capsule Take 1 capsule (30 mg) by mouth daily in the morning 10/13/23      lurasidone (LATUDA) 40 mg Tab tablet Take 1 tablet (40 mg) by mouth daily with food 10/28/22      lurasidone (LATUDA) 40 mg Tab tablet Take 1 tablet (40 mg) by mouth daily with food 9/5/23      methIMAzole (TAPAZOLE) 5 MG Tab Take  5 mg by mouth.    Provider, Historical   norethindrone (AYGESTIN) 5 mg Tab Take 5 mg by mouth 3 (three) times daily. 9/7/22   Provider, Historical   topiramate (TOPAMAX) 25 MG tablet Take 1 tablet by mouth nightly for 1 week, then twice a day for 2 weeks. 1/17/24   González Alves MD   traZODone (DESYREL) 50 MG tablet Take 1 tablet (50 mg) by mouth daily at bedtime as needed 7/8/22      valACYclovir (VALTREX) 1000 MG tablet Take 1 tablet (1,000 mg total) by mouth once daily. 12/13/23 12/12/24  González Alves MD        Vitamins, Minerals, and/or Supplements:  Prenatal      Food Allergies or Intolerances:  NKFA     Social History    Marital status:      Social History     Tobacco Use    Smoking status: Never    Smokeless tobacco: Never   Substance Use Topics    Alcohol use: Yes     Alcohol/week: 1.0 standard drink of alcohol     Types: 1 Drinks containing 0.5 oz of alcohol per week     Comment: Occasionally.     Current Alcohol use: Yes. 1 drink on Saturday nights. Either a Hi Noon or a vodka soda cocktail    Lab Reports   Reviewed and noted    Lab Results   Component Value Date    TSH 0.144 (L) 08/10/2023    FREET4 1.00 08/10/2023    AST 17 05/02/2023    ALT 20 05/02/2023    HDL 64 07/26/2022    LDLCALC 110.8 07/26/2022    TRIG 66 07/26/2022    HGBA1C 5.2 05/02/2023    HGBA1C 5.3 07/26/2022         BP Readings from Last 1 Encounters:   08/16/23 126/80        24-hour Recall: Reviewed and noted during consult     Beverages  Water: 5-8 drinks per day  Coffee: 2 cups per day (lots of half n half and sugar)  Milk: whole      LIFESTYLE FACTORS    Dinning out: 1-2 x per week    Meal preparation/shopping:         Sleep: well    Stress Level: moderate    Support System:  spouse    Exercise Regimen: lightly active (low intensity, 1-3 days a week)      Diagnosis    Inadequate energy intake related to skipping some meals and snacks as evidenced by 24 hour recall.      Intervention    Estimated Energy  Requirements:   Calories: 1900  Protein: 130-160 grams  Carbohydrates: ~140 grams  Fats: ~90 grams; Choose plant-based heart healthy fats  Total fluid: 110 oz + sweat loss  Limit added sugar to 20 grams or less per day (Note: 1 teaspoon of sugar = ~5 grams)    Recommendations & Goals:  Patient goals and recommendations are tailored to the specific patient's needs, readiness to change, lifestyle, culture, skills, resources, & abilities. Strategies to help achieve these nutrition-related goals were discussed which can include but are not limited to SMART goal setting & mindful eating.     Aim for a minimum of 7 hours sleep   Exercise 60 minutes most days  Eat breakfast within 1-2 hours of waking up  Try not to skip any meals or snacks, not going more than 3-4 hours without eating   At each meal and snack, try to include a source of fiber + lean protein + healthy fat     Written Materials Provided  These resources are intended to assist the patient in making it easier to choose recommended options when eating out & to identify better-for-you brands at the grocery store:    Meal Planning Guide with recommendations discussed along with portion sizes and a customized meal plan   Fueling Well On-the-Go Food Guide  Eat Fit Shopping Guide  Lifestyle Nutrition Meal Guide  RD contact information    Goals:  Initial goal is to retrain your body to help boost your metabolism by eating (or drink protein) every 3-4 hours. A snack is needed between meals where you are going >4 hours (Setting alarms on your phone may initially help as a reminder)  Food is fuel for your body  Eating often helps boost metabolism  Helps preserve lean muscle  Helps with portion control  Increase exercise/movement à  Run at least 3 days per week for 35-45 minutes. Add 2 days per week of strength training for at least 20 minutes.    -Reminder: Even an additional 15-20-minute daily walk helps you stay consistent and adds up in the long run  Always have a  lean protein and/or healthy fat with a carbohydrate source  Increase water intake  Deep fried foods: Enjoy at most twice a month. If initially having 1x/week, then get only 1 item fried, and the other not fried (Ex: Small bledsoe + grilled chicken sandwich)  Focus on understanding at most how many carbohydrate servings I recommend for each meal and snack with dinner being lower in carbs. The remainder of your plate will consist of lean protein and non-starchy veggies  Portion Control:    -Measure and/or weight your recommended (cooked) portions when you start your meal plan. See what each portion looks like on your plate, then eyeball portions for future meals      Monitoring/Evaluation    Monitor the following:  Weight  Sleep  Stress Management  Movement  Nutrient intake in reference to meal plan    Communicated with healthcare provider and documented plan for referral to appropriate agency/healthcare provider as needed    Supervising Physician: Tyler Alcantar MD    Patient motivation, anticipated barriers, expected compliance: Patient is motivated and has verbalized understanding and intent to comply.     Comprehension: good     Follow-up: prn

## 2024-03-26 NOTE — PATIENT INSTRUCTIONS
-Dinner #2 --> Focus on low carb  -Continue to try to eat every 4 hours (Setting an alarm on your phone may be helpful as a reminder)  -Continue to work on not grazing when cooking & preparing foods  -[employer]: Check out the Eat Fit menu. If you want one fun food item, please do! The rest focus on eat fit options  -Continue logging your foods in an neema  -Consistency is key and you're doing great!  - Feel free to email me a few days worth of food logs ([email or web address])

## 2024-04-02 ENCOUNTER — TELEPHONE (OUTPATIENT)
Dept: ENDOCRINOLOGY | Facility: CLINIC | Age: 44
End: 2024-04-02
Payer: COMMERCIAL

## 2024-04-02 DIAGNOSIS — E05.90 SUBCLINICAL HYPERTHYROIDISM: Primary | ICD-10-CM

## 2024-04-04 NOTE — TELEPHONE ENCOUNTER
Called patient to get her labs scheduled and I informed her that she can open link on her portal to scheduled her vv with her .

## 2024-04-09 ENCOUNTER — LAB VISIT (OUTPATIENT)
Dept: LAB | Facility: OTHER | Age: 44
End: 2024-04-09
Attending: INTERNAL MEDICINE
Payer: COMMERCIAL

## 2024-04-09 DIAGNOSIS — E05.90 SUBCLINICAL HYPERTHYROIDISM: ICD-10-CM

## 2024-04-09 LAB
ALBUMIN SERPL BCP-MCNC: 3.5 G/DL (ref 3.5–5.2)
ALP SERPL-CCNC: 53 U/L (ref 55–135)
ALT SERPL W/O P-5'-P-CCNC: 16 U/L (ref 10–44)
ANION GAP SERPL CALC-SCNC: 8 MMOL/L (ref 8–16)
AST SERPL-CCNC: 13 U/L (ref 10–40)
BILIRUB SERPL-MCNC: 0.3 MG/DL (ref 0.1–1)
BUN SERPL-MCNC: 14 MG/DL (ref 6–20)
CALCIUM SERPL-MCNC: 8.9 MG/DL (ref 8.7–10.5)
CHLORIDE SERPL-SCNC: 104 MMOL/L (ref 95–110)
CO2 SERPL-SCNC: 25 MMOL/L (ref 23–29)
CREAT SERPL-MCNC: 0.7 MG/DL (ref 0.5–1.4)
EST. GFR  (NO RACE VARIABLE): >60 ML/MIN/1.73 M^2
ESTIMATED AVG GLUCOSE: 105 MG/DL (ref 68–131)
GLUCOSE SERPL-MCNC: 85 MG/DL (ref 70–110)
HBA1C MFR BLD: 5.3 % (ref 4–5.6)
POTASSIUM SERPL-SCNC: 4.4 MMOL/L (ref 3.5–5.1)
PROT SERPL-MCNC: 7.1 G/DL (ref 6–8.4)
SODIUM SERPL-SCNC: 137 MMOL/L (ref 136–145)
T4 FREE SERPL-MCNC: 1.04 NG/DL (ref 0.71–1.51)
TSH SERPL DL<=0.005 MIU/L-ACNC: 0.33 UIU/ML (ref 0.4–4)

## 2024-04-09 PROCEDURE — 84443 ASSAY THYROID STIM HORMONE: CPT | Performed by: INTERNAL MEDICINE

## 2024-04-09 PROCEDURE — 83036 HEMOGLOBIN GLYCOSYLATED A1C: CPT | Performed by: INTERNAL MEDICINE

## 2024-04-09 PROCEDURE — 80053 COMPREHEN METABOLIC PANEL: CPT | Performed by: INTERNAL MEDICINE

## 2024-04-09 PROCEDURE — 84439 ASSAY OF FREE THYROXINE: CPT | Performed by: INTERNAL MEDICINE

## 2024-04-09 PROCEDURE — 36415 COLL VENOUS BLD VENIPUNCTURE: CPT | Performed by: INTERNAL MEDICINE

## 2024-04-10 ENCOUNTER — OFFICE VISIT (OUTPATIENT)
Dept: ENDOCRINOLOGY | Facility: CLINIC | Age: 44
End: 2024-04-10
Payer: COMMERCIAL

## 2024-04-10 DIAGNOSIS — E05.90 SUBCLINICAL HYPERTHYROIDISM: Primary | ICD-10-CM

## 2024-04-10 PROCEDURE — 1159F MED LIST DOCD IN RCRD: CPT | Mod: CPTII,95,, | Performed by: INTERNAL MEDICINE

## 2024-04-10 PROCEDURE — 99214 OFFICE O/P EST MOD 30 MIN: CPT | Mod: 95,,, | Performed by: INTERNAL MEDICINE

## 2024-04-10 PROCEDURE — 3044F HG A1C LEVEL LT 7.0%: CPT | Mod: CPTII,95,, | Performed by: INTERNAL MEDICINE

## 2024-04-10 PROCEDURE — 1160F RVW MEDS BY RX/DR IN RCRD: CPT | Mod: CPTII,95,, | Performed by: INTERNAL MEDICINE

## 2024-04-10 NOTE — ASSESSMENT & PLAN NOTE
Reviewed that her thyroid labs are only mildly abnormal and there would be no contraindication to trying semaglutide

## 2024-04-10 NOTE — ASSESSMENT & PLAN NOTE
Reviewed that there is no evidence of autoimmune thyroid disease, the thyroid scan and uptake did not show any hot nodules it did not show increased uptake.  So at this point the differential really is this mild subclinical hyperthyroidism versus normal variant.  We will follow labs every 6 months and discuss treatment if the TSH is less than 0.01 or if she becomes symptomatic.

## 2024-04-10 NOTE — PROGRESS NOTES
Subjective:      Patient ID: Veronique Louise is a 43 y.o.    Chief Complaint:  Abnormal thyroid labs-- Subclinical hyperthyroidism versus normal variant    History of Present Illness    With regards to the subclinical hyperthyroidism, she was noted to have a mildly low TSH since 2016.  She was started on methimazole for a number of years  Stopped 2020    TSH receptor antibody was undetectable      Thyroid scan and uptake from 03/15/2024    FINDINGS:  The 24 hour uptake is normal at 16.8 % (normal 10-30%).     Thyroid appears within normal limits for size noting slight asymmetric enlargement of right lobe as compared to the left lobe.  Homogeneous tracer distribution throughout both lobes.  No evidence of hyper or hypofunctioning nodule.     Impression:     1. Normal 24 hour radioiodine uptake by the thyroid.  2. Normal scintigraphic appearance of the thyroid.    She is having periods she did have a bilateral tubal ligation    With regards to obesity, There is no height or weight on file to calculate BMI.,    Denies symptoms of hyperglycemia such as polyuria, polydipsia, nocturia, unexplained weight loss or blurred vision    She is interested in starting semaglutide and is wondering if there is any contraindications    ROS:   As above    Objective:     There were no vitals taken for this visit.    There is no height or weight on file to calculate BMI.      Physical Exam  Constitutional:       Appearance: Normal appearance.   Psychiatric:         Attention and Perception: Attention normal.         Mood and Affect: Mood normal.         Speech: Speech normal.         Behavior: Behavior normal.         Thought Content: Thought content normal.         Judgment: Judgment normal.                Lab Review:   Lab Results   Component Value Date    HGBA1C 5.3 04/09/2024     Lab Results   Component Value Date    CHOL 188 07/26/2022    HDL 64 07/26/2022    LDLCALC 110.8 07/26/2022    TRIG 66 07/26/2022    CHOLHDL 34.0  "07/26/2022     Lab Results   Component Value Date     04/09/2024    K 4.4 04/09/2024     04/09/2024    CO2 25 04/09/2024    GLU 85 04/09/2024    BUN 14 04/09/2024    CREATININE 0.7 04/09/2024    CALCIUM 8.9 04/09/2024    PROT 7.1 04/09/2024    ALBUMIN 3.5 04/09/2024    BILITOT 0.3 04/09/2024    ALKPHOS 53 (L) 04/09/2024    AST 13 04/09/2024    ALT 16 04/09/2024    ANIONGAP 8 04/09/2024    ESTGFRAFRICA >60 07/26/2022    EGFRNONAA >60 07/26/2022    TSH 0.331 (L) 04/09/2024     No results found for: "[id number]"  Lab Results   Component Value Date    WBC 8.82 05/02/2023    HGB 11.5 (L) 05/02/2023    HCT 35.9 (L) 05/02/2023    MCV 93 05/02/2023     05/02/2023           Assessment and Plan     Subclinical hyperthyroidism  Reviewed that there is no evidence of autoimmune thyroid disease, the thyroid scan and uptake did not show any hot nodules it did not show increased uptake.  So at this point the differential really is this mild subclinical hyperthyroidism versus normal variant.  We will follow labs every 6 months and discuss treatment if the TSH is less than 0.01 or if she becomes symptomatic.    BMI 36.0-36.9,adult  Reviewed that her thyroid labs are only mildly abnormal and there would be no contraindication to trying semaglutide      The patient location is: [place]  The chief complaint leading to consultation is: above     Visit type: audiovisual    Level of service is based on medical decision-making and not time      Each patient to whom he or she provides medical services by telemedicine is:  (1) informed of the relationship between the physician and patient and the respective role of any other health care provider with respect to management of the patient; and (2) notified that he or she may decline to receive medical services by telemedicine and may withdraw from such care at any time.      "

## 2024-04-10 NOTE — PATIENT INSTRUCTIONS
Thank you for completing a virtual visit with me!     Per our conversation, we will recheck thyroid labs in 6 months and see you in 1 year.  If the TSH is less than 0.1 or you become symptomatic we will discuss treatment at that time.    Please let me know if you have any other questions.    Thank you,  Renetta Samaniego MD

## 2024-05-14 ENCOUNTER — OFFICE VISIT (OUTPATIENT)
Dept: INTERNAL MEDICINE | Facility: CLINIC | Age: 44
End: 2024-05-14
Payer: COMMERCIAL

## 2024-05-14 ENCOUNTER — LAB VISIT (OUTPATIENT)
Dept: LAB | Facility: OTHER | Age: 44
End: 2024-05-14
Attending: STUDENT IN AN ORGANIZED HEALTH CARE EDUCATION/TRAINING PROGRAM
Payer: COMMERCIAL

## 2024-05-14 VITALS
OXYGEN SATURATION: 98 % | WEIGHT: 216.25 LBS | BODY MASS INDEX: 36.92 KG/M2 | HEIGHT: 64 IN | DIASTOLIC BLOOD PRESSURE: 86 MMHG | SYSTOLIC BLOOD PRESSURE: 130 MMHG | HEART RATE: 83 BPM

## 2024-05-14 DIAGNOSIS — Z00.00 ANNUAL PHYSICAL EXAM: Primary | ICD-10-CM

## 2024-05-14 DIAGNOSIS — Z00.00 ANNUAL PHYSICAL EXAM: ICD-10-CM

## 2024-05-14 DIAGNOSIS — E05.90 SUBCLINICAL HYPERTHYROIDISM: ICD-10-CM

## 2024-05-14 DIAGNOSIS — Z80.0 FAMILY HISTORY OF COLON CANCER: ICD-10-CM

## 2024-05-14 DIAGNOSIS — F31.81 BIPOLAR II DISORDER: ICD-10-CM

## 2024-05-14 DIAGNOSIS — F33.42 RECURRENT MAJOR DEPRESSIVE DISORDER, IN FULL REMISSION: ICD-10-CM

## 2024-05-14 DIAGNOSIS — D64.9 ANEMIA, UNSPECIFIED TYPE: ICD-10-CM

## 2024-05-14 LAB
ALBUMIN SERPL BCP-MCNC: 3.8 G/DL (ref 3.5–5.2)
ALP SERPL-CCNC: 52 U/L (ref 55–135)
ALT SERPL W/O P-5'-P-CCNC: 12 U/L (ref 10–44)
ANION GAP SERPL CALC-SCNC: 10 MMOL/L (ref 8–16)
AST SERPL-CCNC: 13 U/L (ref 10–40)
BILIRUB SERPL-MCNC: 0.3 MG/DL (ref 0.1–1)
BUN SERPL-MCNC: 12 MG/DL (ref 6–20)
CALCIUM SERPL-MCNC: 9.3 MG/DL (ref 8.7–10.5)
CHLORIDE SERPL-SCNC: 103 MMOL/L (ref 95–110)
CHOLEST SERPL-MCNC: 178 MG/DL (ref 120–199)
CHOLEST/HDLC SERPL: 3 {RATIO} (ref 2–5)
CO2 SERPL-SCNC: 27 MMOL/L (ref 23–29)
CREAT SERPL-MCNC: 0.7 MG/DL (ref 0.5–1.4)
ERYTHROCYTE [DISTWIDTH] IN BLOOD BY AUTOMATED COUNT: 12.8 % (ref 11.5–14.5)
EST. GFR  (NO RACE VARIABLE): >60 ML/MIN/1.73 M^2
ESTIMATED AVG GLUCOSE: 108 MG/DL (ref 68–131)
FERRITIN SERPL-MCNC: 37 NG/ML (ref 20–300)
GLUCOSE SERPL-MCNC: 88 MG/DL (ref 70–110)
HBA1C MFR BLD: 5.4 % (ref 4–5.6)
HCT VFR BLD AUTO: 39 % (ref 37–48.5)
HDLC SERPL-MCNC: 59 MG/DL (ref 40–75)
HDLC SERPL: 33.1 % (ref 20–50)
HGB BLD-MCNC: 12.5 G/DL (ref 12–16)
LDLC SERPL CALC-MCNC: 105.6 MG/DL (ref 63–159)
MCH RBC QN AUTO: 29.6 PG (ref 27–31)
MCHC RBC AUTO-ENTMCNC: 32.1 G/DL (ref 32–36)
MCV RBC AUTO: 92 FL (ref 82–98)
NONHDLC SERPL-MCNC: 119 MG/DL
PLATELET # BLD AUTO: 255 K/UL (ref 150–450)
PMV BLD AUTO: 10 FL (ref 9.2–12.9)
POTASSIUM SERPL-SCNC: 4.3 MMOL/L (ref 3.5–5.1)
PROT SERPL-MCNC: 7.5 G/DL (ref 6–8.4)
RBC # BLD AUTO: 4.22 M/UL (ref 4–5.4)
SODIUM SERPL-SCNC: 140 MMOL/L (ref 136–145)
T4 FREE SERPL-MCNC: 1.1 NG/DL (ref 0.71–1.51)
TRIGL SERPL-MCNC: 67 MG/DL (ref 30–150)
TSH SERPL DL<=0.005 MIU/L-ACNC: 0.06 UIU/ML (ref 0.4–4)
WBC # BLD AUTO: 7.76 K/UL (ref 3.9–12.7)

## 2024-05-14 PROCEDURE — 80061 LIPID PANEL: CPT | Performed by: STUDENT IN AN ORGANIZED HEALTH CARE EDUCATION/TRAINING PROGRAM

## 2024-05-14 PROCEDURE — 84439 ASSAY OF FREE THYROXINE: CPT | Performed by: STUDENT IN AN ORGANIZED HEALTH CARE EDUCATION/TRAINING PROGRAM

## 2024-05-14 PROCEDURE — 82728 ASSAY OF FERRITIN: CPT | Performed by: STUDENT IN AN ORGANIZED HEALTH CARE EDUCATION/TRAINING PROGRAM

## 2024-05-14 PROCEDURE — 3075F SYST BP GE 130 - 139MM HG: CPT | Mod: CPTII,S$GLB,, | Performed by: STUDENT IN AN ORGANIZED HEALTH CARE EDUCATION/TRAINING PROGRAM

## 2024-05-14 PROCEDURE — 3079F DIAST BP 80-89 MM HG: CPT | Mod: CPTII,S$GLB,, | Performed by: STUDENT IN AN ORGANIZED HEALTH CARE EDUCATION/TRAINING PROGRAM

## 2024-05-14 PROCEDURE — 85027 COMPLETE CBC AUTOMATED: CPT | Performed by: STUDENT IN AN ORGANIZED HEALTH CARE EDUCATION/TRAINING PROGRAM

## 2024-05-14 PROCEDURE — 3044F HG A1C LEVEL LT 7.0%: CPT | Mod: CPTII,S$GLB,, | Performed by: STUDENT IN AN ORGANIZED HEALTH CARE EDUCATION/TRAINING PROGRAM

## 2024-05-14 PROCEDURE — 80053 COMPREHEN METABOLIC PANEL: CPT | Performed by: STUDENT IN AN ORGANIZED HEALTH CARE EDUCATION/TRAINING PROGRAM

## 2024-05-14 PROCEDURE — 84443 ASSAY THYROID STIM HORMONE: CPT | Performed by: STUDENT IN AN ORGANIZED HEALTH CARE EDUCATION/TRAINING PROGRAM

## 2024-05-14 PROCEDURE — 99999 PR PBB SHADOW E&M-EST. PATIENT-LVL III: CPT | Mod: PBBFAC,,, | Performed by: STUDENT IN AN ORGANIZED HEALTH CARE EDUCATION/TRAINING PROGRAM

## 2024-05-14 PROCEDURE — 1159F MED LIST DOCD IN RCRD: CPT | Mod: CPTII,S$GLB,, | Performed by: STUDENT IN AN ORGANIZED HEALTH CARE EDUCATION/TRAINING PROGRAM

## 2024-05-14 PROCEDURE — 36415 COLL VENOUS BLD VENIPUNCTURE: CPT | Performed by: STUDENT IN AN ORGANIZED HEALTH CARE EDUCATION/TRAINING PROGRAM

## 2024-05-14 PROCEDURE — 3008F BODY MASS INDEX DOCD: CPT | Mod: CPTII,S$GLB,, | Performed by: STUDENT IN AN ORGANIZED HEALTH CARE EDUCATION/TRAINING PROGRAM

## 2024-05-14 PROCEDURE — 83036 HEMOGLOBIN GLYCOSYLATED A1C: CPT | Performed by: STUDENT IN AN ORGANIZED HEALTH CARE EDUCATION/TRAINING PROGRAM

## 2024-05-14 PROCEDURE — 99396 PREV VISIT EST AGE 40-64: CPT | Mod: S$GLB,,, | Performed by: STUDENT IN AN ORGANIZED HEALTH CARE EDUCATION/TRAINING PROGRAM

## 2024-05-14 RX ORDER — LISDEXAMFETAMINE DIMESYLATE 40 MG/1
40 CAPSULE ORAL EVERY MORNING
COMMUNITY
Start: 2024-04-15

## 2024-05-14 NOTE — PROGRESS NOTES
[facility]    Subjective:       Patient ID: Veronique Louise is a 44 y.o. female.    Chief Complaint: Annual Exam      History was obtained from the patient and supplemented through chart review.  This patient is known to me.     HPI:    Patient is a 44 y.o. female who presents for annual    Subclinical hyperthyroidism  Was on methimazole in the past, waiting until  Endo, being followed by Dr. Manzo and fellows  $1800 for NM radioactive uptake, inconclusive    Obesity 2/2 psychoactive medications  Already working on diet, exercise  Going to Nutrition   Taking GLP1 from outside source, no contraindications, difficult to tolerate, will be taking low dose once every 2 weeks to minimize effects  Different relationship with food lately    Major depression  Later dx of bipolar, PTSD, anxiety, ADHD  Loss of child with choking incident  Went to therapy  On [place]  Med psychistatrist/psychologist Dr. Estevan Armendariz    Anemia  Check iron labs in addition to recent labs ordered by endo    Interested in genetics at some point?    Family hx of colon cancer in Dad age 63, late stage  C-scope 11/2022 Dr. CORAL Louise at [facility], repeat 5 years    Working full time    HSV  Valtrex 1000 mg PRN, sleep   Every 3-5 months    Medical History  Past Medical History:   Diagnosis Date    Anemia     Breast disorder     Abscess post childbirth    Gestational diabetes     Gestational with last child    HSV infection     Major depressive disorder, recurrent, unspecified 10/12/2015    Subclinical hyperthyroidism 12/19/2016       Review of Systems   Constitutional:  Negative for fever.   HENT:  Negative for trouble swallowing.    Respiratory:  Negative for shortness of breath.    Cardiovascular:  Negative for chest pain.   Gastrointestinal:  Positive for nausea. Negative for constipation, diarrhea and vomiting.   Musculoskeletal:  Negative for gait problem.   Neurological:  Negative for dizziness and seizures.  "  Psychiatric/Behavioral:  Negative for hallucinations.          Surgical hx, family hx, social hx   Have been reviewed      Current Outpatient Medications:     busPIRone (BUSPAR) 10 MG tablet, Take 1 tablet (10 mg) by mouth 2 times per day, Disp: 180 tablet, Rfl: 3    busPIRone (BUSPAR) 10 MG tablet, Take 1 tablet (10 mg) by mouth 3 times per day, Disp: 90 tablet, Rfl: 3    lisdexamfetamine (VYVANSE) 40 MG Cap, Take 40 mg by mouth every morning., Disp: , Rfl:     lurasidone (LATUDA) 40 mg Tab tablet, Take 1 tablet (40 mg) by mouth daily with food, Disp: 90 tablet, Rfl: 3    lurasidone (LATUDA) 40 mg Tab tablet, Take 1 tablet (40 mg) by mouth daily with food, Disp: 30 tablet, Rfl: 3    lurasidone (LATUDA) 40 mg Tab tablet, Take 1 tablet (40 mg) by mouth daily with food, Disp: 90 tablet, Rfl: 3    norethindrone (AYGESTIN) 5 mg Tab, Take 5 mg by mouth 3 (three) times daily., Disp: , Rfl:     traZODone (DESYREL) 50 MG tablet, Take 1 tablet (50 mg) by mouth daily at bedtime as needed, Disp: 90 tablet, Rfl: 3    valACYclovir (VALTREX) 1000 MG tablet, Take 1 tablet (1,000 mg total) by mouth once daily., Disp: 90 tablet, Rfl: 3    Objective:        Body mass index is 37.12 kg/m².  Vitals:    05/14/24 0831 05/14/24 0855   BP: 130/88 130/86   Pulse: 83    SpO2: 98%    Weight: 98.1 kg (216 lb 4.3 oz)    Height: 5' 4" (1.626 m)    PainSc: 0-No pain          Physical Exam  Vitals and nursing note reviewed.   Constitutional:       General: She is not in acute distress.     Appearance: Normal appearance. She is not ill-appearing.   HENT:      Head: Normocephalic and atraumatic.   Eyes:      General: No scleral icterus.  Cardiovascular:      Rate and Rhythm: Normal rate and regular rhythm.      Heart sounds: Normal heart sounds.   Pulmonary:      Effort: Pulmonary effort is normal.   Musculoskeletal:         General: No deformity.      Cervical back: Normal range of motion.   Skin:     General: Skin is warm and dry. "   Neurological:      Mental Status: She is alert and oriented to person, place, and time.   Psychiatric:         Behavior: Behavior normal.           Lab Results   Component Value Date    WBC 8.82 05/02/2023    HGB 11.5 (L) 05/02/2023    HCT 35.9 (L) 05/02/2023     05/02/2023    CHOL 188 07/26/2022    TRIG 66 07/26/2022    HDL 64 07/26/2022    ALT 16 04/09/2024    AST 13 04/09/2024     04/09/2024    K 4.4 04/09/2024     04/09/2024    CREATININE 0.7 04/09/2024    BUN 14 04/09/2024    CO2 25 04/09/2024    TSH 0.331 (L) 04/09/2024    HGBA1C 5.3 04/09/2024       The 10-year ASCVD risk score (Aga WESTFALL, et al., 2019) is: 0.5%    Values used to calculate the score:      Age: 44 years      Sex: Female      Is Non- : Yes      Diabetic: No      Tobacco smoker: No      Systolic Blood Pressure: 130 mmHg      Is BP treated: No      HDL Cholesterol: 64 mg/dL      Total Cholesterol: 188 mg/dL    (Imaging have been independently reviewed)    Reviewed mammo    Assessment:         1. Annual physical exam    2. Subclinical hyperthyroidism    3. Family history of colon cancer    4. Bipolar II disorder    5. Recurrent major depressive disorder, in full remission    6. BMI 37.0-37.9, adult              Plan:     Veronique was seen today for annual exam.    Diagnoses and all orders for this visit:    Annual physical exam    Subclinical hyperthyroidism    Family history of colon cancer    Bipolar II disorder    Recurrent major depressive disorder, in full remission    BMI 37.0-37.9, adult            Health Maintenance  - Lipids:  - A1C:   - Colon Ca Screen: C-scope 11/2022 Dr. CORAL Louise at [facility], repeat 5 years  - Immunizations:    Women's health  - Pap: HPV neg 2018, due July Dr. Campos  - Mammo: Birads 1 9/5/2023  - Dexa: na  - Contraception: s/p salpingectomy     All of your core healthy metrics are met.      Follow up in about 1 year (around 5/14/2025). or sooner prn        All medications were  reviewed including potential side effects and risks/benefits.  Pt was counseled to call back if anything worsens or if questions arise.        González Alves MD  Family Medicine  [facility]  [facility] [address] [address]  Phone [phone]  Fax [phone]

## 2024-05-28 ENCOUNTER — TELEPHONE (OUTPATIENT)
Dept: INTERNAL MEDICINE | Facility: CLINIC | Age: 44
End: 2024-05-28
Payer: COMMERCIAL

## 2024-05-28 ENCOUNTER — CLINICAL SUPPORT (OUTPATIENT)
Dept: INTERNAL MEDICINE | Facility: CLINIC | Age: 44
End: 2024-05-28
Payer: COMMERCIAL

## 2024-05-28 ENCOUNTER — PATIENT MESSAGE (OUTPATIENT)
Dept: INTERNAL MEDICINE | Facility: CLINIC | Age: 44
End: 2024-05-28

## 2024-05-28 VITALS — SYSTOLIC BLOOD PRESSURE: 130 MMHG | DIASTOLIC BLOOD PRESSURE: 85 MMHG

## 2024-05-28 DIAGNOSIS — E66.9 OBESITY (BMI 35.0-39.9 WITHOUT COMORBIDITY): Primary | ICD-10-CM

## 2024-05-28 PROCEDURE — 99999 PR PBB SHADOW E&M-EST. PATIENT-LVL I: CPT | Mod: PBBFAC,,,

## 2024-05-28 NOTE — PROGRESS NOTES
Veronique Hussain Louise 44 y.o. female is here for Blood Pressure check. remote    Manual Blood pressure reading was  130/85, Pulse 67. (Patient took blood pressure at 0715 am due to work)    Diagnosed with Hypertension: no.    Patient took blood pressure medication today: Patient is not on any blood pressure medication.    All Medications and OTC medication updated: yes    Does patient have record of home blood pressure readings / Blood Pressure Log: no.     Patient is asymptomatic.     Were you sitting still for 5-10 minutes prior to taking your Blood pressure? yes     Has your blood pressure monitor ever been checked? no When was last time we checked your blood pressure monitor? N/A    Updated vitals: yes    Follow up date: none    Dr. Alves notified.     Creatinine   Date Value Ref Range Status   05/14/2024 0.7 0.5 - 1.4 mg/dL Final     Sodium   Date Value Ref Range Status   05/14/2024 140 136 - 145 mmol/L Final     Potassium   Date Value Ref Range Status   05/14/2024 4.3 3.5 - 5.1 mmol/L Final

## 2024-05-28 NOTE — TELEPHONE ENCOUNTER
Veronique Nixon Dani 44 y.o. female is here for Blood Pressure check. remote    Manual Blood pressure reading was  130/85, Pulse 67. (Patient took blood pressure at 0715 am due to work)    Diagnosed with Hypertension: no.    Patient took blood pressure medication today: Patient is not on any blood pressure medication.    All Medications and OTC medication updated: yes    Does patient have record of home blood pressure readings / Blood Pressure Log: no.     Patient is asymptomatic.     Were you sitting still for 5-10 minutes prior to taking your Blood pressure? yes     Has your blood pressure monitor ever been checked? no When was last time we checked your blood pressure monitor? N/A    Updated vitals: yes    Follow up date: none    Dr. Alves notified.

## 2024-06-27 ENCOUNTER — PATIENT MESSAGE (OUTPATIENT)
Dept: INTERNAL MEDICINE | Facility: CLINIC | Age: 44
End: 2024-06-27
Payer: COMMERCIAL

## 2024-06-27 DIAGNOSIS — F33.42 RECURRENT MAJOR DEPRESSIVE DISORDER, IN FULL REMISSION: Primary | ICD-10-CM

## 2024-06-27 DIAGNOSIS — F31.81 BIPOLAR II DISORDER: ICD-10-CM

## 2024-07-09 ENCOUNTER — OFFICE VISIT (OUTPATIENT)
Dept: INTERNAL MEDICINE | Facility: CLINIC | Age: 44
End: 2024-07-09
Payer: COMMERCIAL

## 2024-07-09 ENCOUNTER — TELEPHONE (OUTPATIENT)
Dept: HEPATOLOGY | Facility: HOSPITAL | Age: 44
End: 2024-07-09
Payer: COMMERCIAL

## 2024-07-09 ENCOUNTER — PATIENT MESSAGE (OUTPATIENT)
Dept: HEPATOLOGY | Facility: HOSPITAL | Age: 44
End: 2024-07-09
Payer: COMMERCIAL

## 2024-07-09 ENCOUNTER — PATIENT MESSAGE (OUTPATIENT)
Dept: INTERNAL MEDICINE | Facility: CLINIC | Age: 44
End: 2024-07-09

## 2024-07-09 DIAGNOSIS — E66.9 OBESITY (BMI 35.0-39.9 WITHOUT COMORBIDITY): Primary | ICD-10-CM

## 2024-07-09 PROCEDURE — 99499 UNLISTED E&M SERVICE: CPT | Mod: 95,,,

## 2024-07-09 RX ORDER — SEMAGLUTIDE 0.5 MG/.5ML
0.5 INJECTION, SOLUTION SUBCUTANEOUS
Qty: 2 ML | Refills: 1 | Status: ACTIVE | OUTPATIENT
Start: 2024-08-09

## 2024-07-09 RX ORDER — SEMAGLUTIDE 0.25 MG/.5ML
0.25 INJECTION, SOLUTION SUBCUTANEOUS
Qty: 2 ML | Refills: 0 | Status: ACTIVE | OUTPATIENT
Start: 2024-07-09

## 2024-07-09 NOTE — PATIENT INSTRUCTIONS
WEGOVY® (wee-JAROD-vee), (semaglutide) injection, for subcutaneous use  Read this Medication Guide and Instructions for Use before you start using WEGOVY and each time you get a refill. There may be new information. This information does not take the place of talking to your healthcare provider about your medical condition or your treatment.  What is the most important information I should know about WEGOVY?   WEGOVY may cause serious side effects, including:   Possible thyroid tumors, including cancer. Tell your healthcare provider if you get a lump or swelling in your neck, hoarseness, trouble swallowing, or shortness of breath. These may be symptoms of thyroid cancer. In studies with rodents, WEGOVY and medicines that work like WEGOVY caused thyroid tumors, including thyroid cancer. It is not known if WEGOVY will cause thyroid tumors or a type of thyroid cancer called medullary thyroid carcinoma (MTC) in people.   Do not use WEGOVY if you or any of your family have ever had a type of thyroid cancer called medullary thyroid carcinoma (MTC), or if you have an endocrine system condition called Multiple Endocrine Neoplasia syndrome type 2 (MEN 2).  What is WEGOVY?   WEGOVY is an injectable prescription medicine used with a reduced calorie diet and increased physical activity:   to reduce the risk of major cardiovascular events such as death, heart attack, or stroke in adults with known heart disease and with either obesity or overweight.   that may help adults and children aged 12 years and older with obesity, or some adults with overweight who also have weight-related medical problems, to help them lose excess body weight and keep the weight off.   WEGOVY contains semaglutide and should not be used with other semaglutide-containing products or other GLP-1 receptor agonist medicines.  It is not known if WEGOVY is safe and effective for use in children under 12 years of age.  Do not use WEGOVY if:   you or any of your  family have ever had a type of thyroid cancer called medullary thyroid carcinoma (MTC) or if you have an endocrine system condition called Multiple Endocrine Neoplasia syndrome type 2 (MEN 2).   you have had a serious allergic reaction to semaglutide or any of the ingredients in WEGOVY. See the end of this Medication Guide for a complete list of ingredients in WEGOVY. Symptoms of a serious allergic reaction include:   swelling of your face, lips, tongue or throat   fainting or feeling dizzy   problems breathing or swallowing   very rapid heartbeat   severe rash or itching  Before using WEGOVY, tell your healthcare provider if you have any other medical conditions, including if you:   have or have had problems with your pancreas or kidneys.   have type 2 diabetes and a history of diabetic retinopathy.   have or have had depression or suicidal thoughts, or mental health issues.   are pregnant or plan to become pregnant. WEGOVY may harm your unborn baby. You should stop using WEGOVY 2 months before you plan to become pregnant.   Pregnancy Exposure Registry: There is a pregnancy exposure registry for women who use WEGOVY during pregnancy. The purpose of this registry is to collect information about the health of you and your baby. Talk to your healthcare provider about how you can take part in this registry or you may contact [employer] at 1-806.696.8927.   are breastfeeding or plan to breastfeed. It is not known if WEGOVY passes into your breast milk. You should talk with your healthcare provider about the best way to feed your baby while using WEGOVY.   Tell your healthcare provider about all the medicines you take, including prescription and over-the-counter medicines, vitamins, and herbal supplements. WEGOVY may affect the way some medicines work and some medicines may affect the way WEGOVY works. Tell your healthcare provider if you are taking other medicines to treat diabetes, including sulfonylureas or insulin.  WEGOVY slows stomach emptying and can affect medicines that need to pass through the stomach quickly.   Know the medicines you take. Keep a list of them to show your healthcare provider and pharmacist when you get a new medicine.  How should I use WEGOVY?   Read the Instructions for Use that comes with WEGOVY.   Use WEGOVY exactly as your healthcare provider tells you to.   Your healthcare provider should show you how to use WEGOVY before you use it for the first time.   WEGOVY is injected under the skin (subcutaneously) of your stomach (abdomen), thigh, or upper arm. Do not inject WEGOVY into a muscle (intramuscularly) or vein (intravenously).   Change (rotate) your injection site with each injection. Do not use the same site for each injection.   Use WEGOVY 1 time each week, on the same day each week, at any time of the day.   Start WEGOVY with 0.25 mg per week in your first month. In your second month, increase your weekly dose to 0.5 mg. In the third month, increase your weekly dose to 1 mg. In the fourth month, increase your weekly dose to 1.7 mg. In the fifth month onwards, your healthcare provider may either maintain your dose at 1.7 mg weekly or increase your weekly dose to 2.4 mg.   If you need to change the day of the week, you may do so as long as your last dose of WEGOVY was given 2 or more days before.   If you miss a dose of WEGOVY and the next scheduled dose is more than 2 days away (48 hours), take the missed dose as soon as possible. If you miss a dose of WEGOVY and the next schedule dose is less than 2 days away (48 hours), do not administer the dose. Take your next dose on the regularly scheduled day.   If you miss doses of WEGOVY for more than 2 weeks, take your next dose on the regularly scheduled day or call your healthcare provider to talk about how to restart your treatment.   You can take WEGOVY with or without food.   If you take too much WEGOVY, you may have severe nausea, severe vomiting  and severe low blood sugar. Call your healthcare provider or go to the nearest hospital emergency room right away if you experience any of these symptoms.  What are the possible side effects of WEGOVY?   WEGOVY may cause serious side effects, including:   See What is the most important information I should know about WEGOVY?   inflammation of your pancreas (pancreatitis). Stop using WEGOVY and call your healthcare provider right away if you have severe pain in your stomach area (abdomen) that will not go away, with or without vomiting. You may feel the pain from your abdomen to your back.   gallbladder problems. WEGOVY may cause gallbladder problems including gallstones. Some gallbladder problems need surgery. Call your healthcare provider if you have any of the following symptoms:   pain in your upper stomach (abdomen)   yellowing of skin or eyes (jaundice)   fever   toby-colored stools   increased risk of low blood sugar (hypoglycemia), especially those who also take medicines to treat diabetes mellitus such as insulin or sulfonylureas. Low blood sugar in patients with diabetes who receive WEGOVY can be a serious side effect. Talk to your healthcare provider about how to recognize and treat low blood sugar. You should check your blood sugar before you start taking WEGOVY and while you take WEGOVY. Signs and symptoms of low blood sugar may include:   dizziness or light-headedness   sweating   shakiness   blurred vision   slurred speech   weakness   anxiety   hunger   headache   irritability or mood changes   confusion or drowsiness   fast heartbeat   feeling jittery  kidney problems (kidney failure). In people who have kidney problems, diarrhea, nausea, and vomiting may cause a loss of fluids (dehydration) which may cause kidney problems to get worse. It is important for you to drink fluids to help reduce your chance of dehydration.   serious allergic reactions. Stop using WEGOVY and get medical help right away,  if you have any symptoms of a serious allergic reaction including:   swelling of your face, lips, tongue   severe rash or itching o very rapid heartbeat or throat   problems breathing or swallowing fainting or feeling dizzy   change in vision in people with type 2 diabetes. Tell your healthcare provider if you have changes in vision during treatment with WEGOVY.   increased heart rate. WEGOVY can increase your heart rate while you are at rest. Your healthcare provider should check your heart rate while you take WEGOVY. Tell your healthcare provider if you feel your heart racing or pounding in your chest and it lasts for several minutes.   depression or thoughts of suicide. You should pay attention to any mental changes, especially sudden changes in your mood, behaviors, thoughts, or feelings. Call your healthcare provider right away if you have any mental changes that are new, worse, or worry you.   The most common side effects of WEGOVY in adults or children aged 12 years and older may include:  nausea   stomach (abdomen) pain   dizziness   gas   diarrhea   headache   feeling bloated   stomach flu   vomiting   tiredness (fatigue)   belching   heartburn   constipation   upset stomach   low blood sugar in people   runny nose or sore throat with type 2 diabetes   Talk to your healthcare provider about any side effect that bothers you or does not go away. These are not all the possible side effects of WEGOVY. Call your doctor for medical advice about side effects. You may report side effects to FDA at 0-628-FDA-6438.  General information about the safe and effective use of WEGOVY.  Medicines are sometimes prescribed for purposes other than those listed in a Medication Guide. Do not use WEGOVY for a condition for which it was not prescribed. Do not give WEGOVY to other people, even if they have the same symptoms that you have. It may harm them. You can ask your pharmacist or healthcare provider for information about  WEGOVY that is written for health professionals.  What are the ingredients in WEGOVY?   Active Ingredient: semaglutide   Inactive Ingredients: disodium phosphate dihydrate, 1.42 mg; sodium chloride, 8.25 mg; water for injection; and hydrochloric acid or sodium hydroxide may be added to adjust pH.  For more information, go to [employer] or call 5-069-Kvjlsu-7.

## 2024-07-09 NOTE — TELEPHONE ENCOUNTER
Practitioner called patient for consultation after receiving a message on patient's behalf. Practitioner scheduled patient with an appointment.

## 2024-07-09 NOTE — PROGRESS NOTES
"Patient ID: Veronique Louise is a 44 y.o. Black or  female    Subjective  Chief Complaint: patient presents for medical weight loss management.    Contraindications to GLP-1 receptor agonist therapy:   No personal or family history of MTC, personal history of MEN2, history of allergic reaction while taking a GLP-1 receptor agonist, and history of pancreatitis while taking a GLP-1 receptor agonist     History of weight loss therapy:  Reports has been taking compounded semaglutide 0.25 mg. Reports decreased feelings of hunger where she has to remind herself to eat during the day. States she can eat "micro meals" throughout the day instead of eating larger standard meals daily. Has a visit scheduled with the [facility] dietician. Would like to try a slower titration schedule to get used to the medication.      Weight loss history:  Current weight:    6/10/2024   Recent Readings    Weight (lbs) 209 lb    BMI 35.87 BMI        Tolerance to current therapy:  Denies nausea, vomiting, diarrhea, constipation    Objective  Lab Results   Component Value Date     05/14/2024     04/09/2024     05/02/2023     Lab Results   Component Value Date    K 4.3 05/14/2024    K 4.4 04/09/2024    K 3.8 05/02/2023     Lab Results   Component Value Date     05/14/2024     04/09/2024     05/02/2023     Lab Results   Component Value Date    CO2 27 05/14/2024    CO2 25 04/09/2024    CO2 29 05/02/2023     Lab Results   Component Value Date    BUN 12 05/14/2024    BUN 14 04/09/2024    BUN 9 05/02/2023     Lab Results   Component Value Date    GLU 88 05/14/2024    GLU 85 04/09/2024    GLU 95 05/02/2023     Lab Results   Component Value Date    CALCIUM 9.3 05/14/2024    CALCIUM 8.9 04/09/2024    CALCIUM 9.0 05/02/2023     Lab Results   Component Value Date    PROT 7.5 05/14/2024    PROT 7.1 04/09/2024    PROT 7.1 05/02/2023     Lab Results   Component Value Date    ALBUMIN 3.8 " 05/14/2024    ALBUMIN 3.5 04/09/2024    ALBUMIN 3.8 05/02/2023     Lab Results   Component Value Date    BILITOT 0.3 05/14/2024    BILITOT 0.3 04/09/2024    BILITOT 0.3 05/02/2023     Lab Results   Component Value Date    AST 13 05/14/2024    AST 13 04/09/2024    AST 17 05/02/2023     Lab Results   Component Value Date    ALT 12 05/14/2024    ALT 16 04/09/2024    ALT 20 05/02/2023     Lab Results   Component Value Date    ANIONGAP 10 05/14/2024    ANIONGAP 8 04/09/2024    ANIONGAP 5 (L) 05/02/2023     Lab Results   Component Value Date    CREATININE 0.7 05/14/2024    CREATININE 0.7 04/09/2024    CREATININE 0.7 05/02/2023     Lab Results   Component Value Date    EGFRNORACEVR >60 05/14/2024    EGFRNORACEVR >60 04/09/2024    EGFRNORACEVR >60 05/02/2023         Assessment/Plan  -Continuation of GLP-1 RA therapy  -Will continue Wegovy 0.25 mg once weekly for 1 month  -Then increase to Wegovy 0.5 mg once weekly  -RTC in 3 months to assess progress  -Counseling provided regarding eating regularly and nutrition intake. Pt will keep appt with dietician.       Patient consented to pharmacist management via collaborative practice.

## 2024-07-18 ENCOUNTER — PATIENT MESSAGE (OUTPATIENT)
Dept: FAMILY MEDICINE | Facility: HOSPITAL | Age: 44
End: 2024-07-18

## 2024-07-18 ENCOUNTER — OFFICE VISIT (OUTPATIENT)
Dept: FAMILY MEDICINE | Facility: HOSPITAL | Age: 44
End: 2024-07-18
Payer: COMMERCIAL

## 2024-07-18 DIAGNOSIS — Z63.0 RELATIONSHIP PROBLEM BETWEEN PARTNERS: Primary | ICD-10-CM

## 2024-07-18 PROCEDURE — 99211 OFF/OP EST MAY X REQ PHY/QHP: CPT | Performed by: PSYCHOLOGIST

## 2024-07-18 SDOH — SOCIAL DETERMINANTS OF HEALTH (SDOH): PROBLEMS IN RELATIONSHIP WITH SPOUSE OR PARTNER: Z63.0

## 2024-07-18 NOTE — PROGRESS NOTES
[facility]  [place] [address]  [phone]    Diagnostic Assessment    A description of the assessment process, the initial 50-55-minute diagnostic appointment, 20-30-minute follow-up appointments, and patient confidentiality were reviewed. Patient was fully cooperative throughout the interview and was an adequate historian. Patient willingly signed a consent form for treatment and limits of confidentiality were explained in this context.    Note: All information described in this report has been directly reported by the patient in the appointment (unless specifically noted) except for Mental Status, Diagnosis, and Conclusions/Recommendations/Initial Treatment Goals.    Date of Service: 2024  Patient Name:  Veronique Louise  MRN: [id number]  : May 10, 1980  Age:  44  Length of Session: 60 minutes  Present in Session:  Patient  Provider: Blanca Merritt Psy.D.      Identifying Information:  Patient is a 44-year-old  female who is currently living in [place].     Referral Source:  Patient called to schedule an appointment.    Chief Complaint/Presenting Problem:  Patient stated she is in the midst of a challenging life situation / decision.    History of Chief Complaint/Presenting Problem:  Patient stated she has been dealing with her challenging life situation for roughly 5 years. She discussed having found herself romantically attracted to her  as well as her best friend. Patient stated she is unsure how to navigate this pull.    Current Significant/Relationship/Partner:  Patient stated she has been romantically involved with her  for roughly 20 years and has been  to her  for 18 years.    Patient stated she has been romantically involved for 5 years with her best friend of 20 years.     Current Mental Health Symptoms: Patient endorsed/denied changes in the following areas:    Mood: Not assessed due to  time constraints and presenting problem.    Anhedonia: Denied. Patient reported enjoying reading, spending time with friends, and running.     Sleep: Not assessed due to time constraints and presenting problem.    Level of Energy: Not assessed due to time constraints and presenting problem.    Appetite: Not assessed due to time constraints and presenting problem     Hopelessness/Worthlessness: Denied; however, patient discussed self-doubt, particularly surrounding her role as a mother.    Anger/Irritability/Aggression: Not assessed due to time constraints and presenting problem.    Concentration/Attention/Hyperactivity: Not assessed due to time constraints and presenting problem.       Anxiety: Endorsed. Patient discussed particularly experiencing anxiety surrounding her role as a mother.    Trauma/Re-experiencing: Endorsed.    Impulsivity: Not assessed due to time constraints and presenting problem.    Psychotic symptoms/Mone/Hypomania: Not assessed due to time constraints and presenting problem; however, patient reported being diagnosed with Bipolar II Disorder.    Suicidal or Homicidal Ideation: Patient denied any past or present suicidal or homicidal ideation, plan, or intent.     Self-harming behaviors: Patient denied engaging in any past or present self-harming behaviors.    Mental Health History:    Psychiatric History:  Psychiatric Hospitalizations: Not assessed due to time constraints and presenting problem.    Suicidal ideation/attempts: Denied.    Psychiatric Medications: Patient's chart lists prescriptions for trazodone 50 mg; lurasidone 40 mg; lisdexamfetamine 40 mg; and buspirone 10 mg.    Family Psychiatric History: Patient reported her daughters have been diagnosed with anxiety and her youngest daughter has been diagnosed with ADHD.    Psychotherapy History: Patient stated she previously utilized a mental health professional and noted she desired a new professional due to potential blurred lines due  to her romantic life. Patient's chart lists diagnoses of Bipolar II Disorder, depression, anxiety, and agoraphobia.    Medical History:  Patient's chart lists medical concerns related to subclinical hyperthyroidism and anemia.    Regarding surgical history, patient's chart indicates that patient has undergone a colonoscopy (2022); laparoscopic salpingectomy (2019); incision and drainage of left breast (2011); intrauterine device insertion; hysteroscopy; tonsillectomy; adenoidectomy; and a tubal ligation.    Current/Past Medications:   Patient's chart lists the following medications: buspirone 10 mg; lisdexamfetamine 40 mg; lurasidone 40 mg; norethindrone 5 mg; semaglutide 0.5 mg/0.5 ml; trazodone 50 mg; and valacyclovir 1000 mg.    Substance Use:  Not assessed due to time constraints and presenting problem.    Family substance use: Not assessed.    Family History:       Father: Patient reported her father passed away several years prior and described a positive relationship with her father.    Mother: Patient described a complex but positive relationship with her mother.    Siblings: Patient reported being one of 5 children and stated she is the youngest paternal child. She described positive relationships with 3 siblings and described a distant relationship with her older sister.    Children: Patient reported having 3 daughters. She discussed the loss of her middle daughter a few years prior. She described positive relationships with her surviving daughters.    Physical, Verbal, or Sexual Abuse: Not assessed due to time constraints and presenting problem.    Social/Peer History:  Patient listed her , best friend, family friend, and 3 siblings as her support network.    Occupational/Educational History:  Educational History:   Not assessed.    Occupational History:   Patient is currently employed in a full-time capacity.    Current/Past Legal Involvement: Not assessed due to time constraints and presenting  problem.    Spirituality:  Patient identifies as spiritual.    Expectations/Goals for Treatment:  Patient stated she would like to gain a clearer picture of what I actually want and to take action to do the things I want.    Mental Status Exam:  Appearance: Patient was appropriately dressed for her session and had good hygiene.  Expressed Mood: Not assessed due to time constraints and presenting problem.  Affect: Normal range and intensity.  Attitude during Interview: Open and friendly. Candid and cooperative.  Movement and Behavior: No unusual movements or psychomotor changes.  Speech: Normal rate/tone/volume, without pressure.  Eye Contact: Makes eye contact.  Thought processes: Clear, coherent, and organized.  Thought content: No indication of hallucinations, delusions, obsessions, ideas of reference, or symptoms of dissociation.  Suicidal ideation: Denied current thoughts, plan, and intent.  Homicidal ideation: Denied current thoughts, plan, and intent.  Orientation: Oriented x 4 (person, place, time, and situation).  Memory/concentration: WNL.  Insight/Judgment: Strong.    Safety Issues/Plan for Safety/ Risk Management:  Patient denies current fears or concerns for personal safety.  Patient denies current or recent suicidal ideation or behaviors.  Patient denies current or recent homicidal ideation or behaviors.  Patient denies current or recent self-injurious behavior or ideation.  Patient denies other safety concerns.    A safety and risk management plan has not been developed at this time.    Interactive Complexity: None.    Diagnostic Impressions:  Z63.0 Relationship Distress With Spouse or Intimate Partner  F41.9 Unspecified Anxiety Disorder    Conclusions/Recommendations/Treatment Goals:   Patient and practitioner will meet on July 25, 2024, at 10:30 am to continue discussing patient's relationship dynamics, emotional reactions, and sense of self.

## 2024-07-25 ENCOUNTER — TELEPHONE (OUTPATIENT)
Dept: HEPATOLOGY | Facility: HOSPITAL | Age: 44
End: 2024-07-25
Payer: COMMERCIAL

## 2024-07-25 ENCOUNTER — OFFICE VISIT (OUTPATIENT)
Dept: FAMILY MEDICINE | Facility: HOSPITAL | Age: 44
End: 2024-07-25
Payer: COMMERCIAL

## 2024-07-25 DIAGNOSIS — Z63.0 RELATIONSHIP PROBLEM BETWEEN PARTNERS: Primary | ICD-10-CM

## 2024-07-25 SDOH — SOCIAL DETERMINANTS OF HEALTH (SDOH): PROBLEMS IN RELATIONSHIP WITH SPOUSE OR PARTNER: Z63.0

## 2024-07-25 NOTE — PROGRESS NOTES
The patient location is: [place]  The chief complaint leading to consultation is: Relationship Distress With Spouse or Intimate Partner  Unspecified Anxiety Disorder      Visit type: audiovisual    Face to Face time with patient: 53 minutes  57 minutes of total time spent on the encounter, which includes face to face time and non-face to face time preparing to see the patient (e.g., review of tests), Obtaining and/or reviewing separately obtained history, Documenting clinical information in the electronic or other health record, Independently interpreting results (not separately reported) and communicating results to the patient/family/caregiver, or Care coordination (not separately reported).       Each patient to whom he or she provides medical services by telemedicine is: (1) informed of the relationship between the physician and patient and the respective role of any other health care provider with respect to management of the patient; and (2) notified that he or she may decline to receive medical services by telemedicine and may withdraw from such care at any time.    Notes:    Patient discussed her romantic life. Patient was appropriately dressed for her session and appeared to have good hygiene. She displayed depressive mood and affect, with normal range and intensity. She displayed no unusual movements or psychomotor changes. She utilized speech that was normal with regard to rate, tone, and volume, without pressure. She maintained appropriate eye contact and displayed thought processes that were clear, coherent, and organized. Practitioner provided empathy and support while exploring patient's romantic life. Patient discussed being unsure of what she wants. She discussed her marriage; more specifically, the amount of time spent with her  and her concern for him. Practitioner validated patient's emotions and explored patient's marriage. Patient expressed her frustrations with him and her ponderings  regarding his investment into their relationship. Patient discussed her romantic interest and their potential life together. Patient also discussed her concern for her children and any potential ramifications on their wellbeing should she leave her marriage. Patient expressed her belief that she will need to have a difficult conversation with her  and began to brainstorm what she wanted to discuss. Patient and practitioner will meet on August 1, 2024, at 10:30 am to continue discussing patient's romantic life.    Of note, patient asked practitioner for her number. Practitioner provided a work number.

## 2024-07-25 NOTE — TELEPHONE ENCOUNTER
Practitioner called patient to schedule patient's follow up appointments, as practitioner was unable to do so previously.

## 2024-08-01 ENCOUNTER — OFFICE VISIT (OUTPATIENT)
Dept: FAMILY MEDICINE | Facility: HOSPITAL | Age: 44
End: 2024-08-01
Payer: COMMERCIAL

## 2024-08-01 DIAGNOSIS — Z63.0 RELATIONSHIP PROBLEM BETWEEN PARTNERS: Primary | ICD-10-CM

## 2024-08-01 SDOH — SOCIAL DETERMINANTS OF HEALTH (SDOH): PROBLEMS IN RELATIONSHIP WITH SPOUSE OR PARTNER: Z63.0

## 2024-08-02 ENCOUNTER — PATIENT MESSAGE (OUTPATIENT)
Dept: FAMILY MEDICINE | Facility: HOSPITAL | Age: 44
End: 2024-08-02
Payer: COMMERCIAL

## 2024-08-15 ENCOUNTER — OFFICE VISIT (OUTPATIENT)
Dept: FAMILY MEDICINE | Facility: HOSPITAL | Age: 44
End: 2024-08-15
Payer: COMMERCIAL

## 2024-08-15 DIAGNOSIS — Z03.89 NO DIAGNOSIS ON AXIS I: Primary | ICD-10-CM

## 2024-08-15 NOTE — PROGRESS NOTES
The patient location is: [place]  The chief complaint leading to consultation is:     Relationship Distress With Spouse or Intimate Partner  Unspecified Anxiety Disorder    Visit type: audiovisual    Face to Face time with patient: 50 minutes  52 minutes of total time spent on the encounter, which includes face to face time and non-face to face time preparing to see the patient (e.g., review of tests), Obtaining and/or reviewing separately obtained history, Documenting clinical information in the electronic or other health record, Independently interpreting results (not separately reported) and communicating results to the patient/family/caregiver, or Care coordination (not separately reported).       Each patient to whom he or she provides medical services by telemedicine is: (1) informed of the relationship between the physician and patient and the respective role of any other health care provider with respect to management of the patient; and (2) notified that he or she may decline to receive medical services by telemedicine and may withdraw from such care at any time.    Notes:    Patient discussed her desire for self-love and action items. Patient was appropriately dressed for her session and appeared to have good hygiene. She displayed depressive mood and affect, with normal range and intensity. She displayed no unusual movements or psychomotor changes. She utilized speech that was normal with regard to rate, tone, and volume, without pressure. She maintained appropriate eye contact and displayed thought processes that were clear, coherent, and organized. Practitioner provided empathy and support while exploring patient's sense of self-love. Patient discussed feeling judged and feeling different from her siblings and family when expressing herself. Patient recalled hearing comments of judgement. Practitioner summarized the impact feeling distant and judged can have on a child, particularly from close loved  ones. Practitioner inquired if patient judged herself similarly to the way she felt judged. Patient agreed and stated there are parts of herself she dislikes, causing her anxiety. Practitioner explored what patient did not like about herself. Patient expressed her belief that she does not set clear boundaries with her loved ones nor demonstrate how she wants to be treated/loved. Patient stated she also struggles with her premium on comfort, causing her to miss opportunities, and patient discussed her perception that she is never satisfied. Patient also discussed feeling as though she is loved based on what she can do for others and discussed struggling with believing that she is loved for herself. Practitioner inquired if patient felt comfortable with asking her loved one to express the reasons patient is lovable. Patient agreed. Patient and practitioner also discussed patient setting and maintaining boundaries for herself and her time after patient identified that she wanted to be considered and treated as though she has her own life rather than treated solely in service to others. Patient and practitioner will meet on August 22, 2024, at 9:30 am to continue exploring patient's self-love and patient's boundary setting.    Patient inquired about billing for sessions. Practitioner reported she would ask [facility] billing.

## 2024-08-20 ENCOUNTER — PATIENT MESSAGE (OUTPATIENT)
Dept: FAMILY MEDICINE | Facility: HOSPITAL | Age: 44
End: 2024-08-20
Payer: COMMERCIAL

## 2024-08-22 ENCOUNTER — OFFICE VISIT (OUTPATIENT)
Dept: FAMILY MEDICINE | Facility: HOSPITAL | Age: 44
End: 2024-08-22
Payer: COMMERCIAL

## 2024-08-22 DIAGNOSIS — F41.8 OTHER SPECIFIED ANXIETY DISORDERS: Primary | ICD-10-CM

## 2024-08-22 NOTE — PROGRESS NOTES
The patient location is: [place]  The chief complaint leading to consultation is:     Relationship Distress With Spouse or Intimate Partner  Unspecified Anxiety Disorder      Visit type: audiovisual    Face to Face time with patient: 50 minutes  55 minutes of total time spent on the encounter, which includes face to face time and non-face to face time preparing to see the patient (e.g., review of tests), Obtaining and/or reviewing separately obtained history, Documenting clinical information in the electronic or other health record, Independently interpreting results (not separately reported) and communicating results to the patient/family/caregiver, or Care coordination (not separately reported).       Each patient to whom he or she provides medical services by telemedicine is: (1) informed of the relationship between the physician and patient and the respective role of any other health care provider with respect to management of the patient; and (2) notified that he or she may decline to receive medical services by telemedicine and may withdraw from such care at any time.    Notes:    Patient discussed her stressors. Patient was appropriately dressed for her session and appeared to have good hygiene. She displayed somewhat depressive mood and affect, with normal range and intensity. She displayed no unusual movements or psychomotor changes. She utilized speech that was normal with regard to rate, tone, and volume, without pressure. She maintained appropriate eye contact and displayed thought processes that were clear, coherent, and organized. Practitioner provided empathy and support while exploring patient's stressors and explored patient's experience of setting boundaries. Patient stated she set small boundaries but overall had difficulty. Patient expressed her desire to remove herself from her responsibilities and spend time alone to continue learning herself. Patient noted she would like to distance herself  from her romantic interest due to her feelings of jealousy for her other partner. Patient and practitioner explored what a relationship with her interest might look like. Patient provided a general picture of the relationship she wanted. Patient discussed the stresses of parenthood and highlighted her belief that she should take care of several things for her loved ones. Patient noted she learned this behavior from her mother. Patient discussed her propensity to put others first and noted she is unsure how to apply the logic of self-love to herself. Patient noted she sees positives in herself only through others' eyes. Practitioner validated patient's emotions and inquired if it would be helpful to ask her loved ones what they love about her to begin to internalize these traits. Patient denied. Practitioner suggested patient create a list of things she likes about herself to begin to strengthen and build upon the list. Patient agreed. Patient and practitioner will meet on August 29, 2024, at 9:30 am to continue exploring patient's sense of self and self-love.   Statement Selected

## 2024-08-29 ENCOUNTER — PATIENT MESSAGE (OUTPATIENT)
Dept: FAMILY MEDICINE | Facility: HOSPITAL | Age: 44
End: 2024-08-29

## 2024-08-29 ENCOUNTER — OFFICE VISIT (OUTPATIENT)
Dept: FAMILY MEDICINE | Facility: HOSPITAL | Age: 44
End: 2024-08-29
Payer: COMMERCIAL

## 2024-08-29 DIAGNOSIS — Z63.0 RELATIONSHIP PROBLEM BETWEEN PARTNERS: ICD-10-CM

## 2024-08-29 DIAGNOSIS — F41.8 OTHER SPECIFIED ANXIETY DISORDERS: Primary | ICD-10-CM

## 2024-08-29 SDOH — SOCIAL DETERMINANTS OF HEALTH (SDOH): PROBLEMS IN RELATIONSHIP WITH SPOUSE OR PARTNER: Z63.0

## 2024-08-29 NOTE — PROGRESS NOTES
The patient location is: [place]  The chief complaint leading to consultation is:     Relationship Distress With Spouse or Intimate Partner  Unspecified Anxiety Disorder      Visit type: audiovisual    Face to Face time with patient: 60 minutes  60 minutes of total time spent on the encounter, which includes face to face time and non-face to face time preparing to see the patient (e.g., review of tests), Obtaining and/or reviewing separately obtained history, Documenting clinical information in the electronic or other health record, Independently interpreting results (not separately reported) and communicating results to the patient/family/caregiver, or Care coordination (not separately reported).       Each patient to whom he or she provides medical services by telemedicine is: (1) informed of the relationship between the physician and patient and the respective role of any other health care provider with respect to management of the patient; and (2) notified that he or she may decline to receive medical services by telemedicine and may withdraw from such care at any time.    Notes:    Patient discussed her sense of self. Patient was appropriately dressed for her session and had good hygiene. She displayed calm mood and affect with normal range and intensity. She displayed no unusual movements or psychomotor changes. She utilized speech that was normal with regard to rate, tone, and volume, without pressure. She maintained appropriate eye contact and displayed thought processes that were clear, coherent, and organized. Practitioner provided empathy and support while exploring patient's sense of self.     Patient discussed her list of strengths. Patient also discussed the notion that there are 2 different versions of her: one that self-doubts and defers to others' leadership. Patient stated she is fearful that becoming the version of herself she desires would be too great of a departure from the person she is  currently.  Practitioner inquired if patient feared this departure due to her propensity for comfort. Patient denied and discussed her early learning and perception of judgement.     Patient discussed learning self-doubt and that familial responsibility is toil from her mother and family dynamics. Patient discussed hearing her family  others and deciding she would dress and behave in ways to not be judged. Practitioner explored patient's feeling of being judged. Patient stated she wanted to be liked. Practitioner explored patient's desire to be liked. Patient stated she is comfortable not being liked when she is speaking and advocating about things on which she is knowledgeable, but highlighted retreating on subjects about which she is less familiar.     Practitioner inquired about why others' opinions were so poignant. Patient stated she was unsure and noted others' opinion was more important that she wanted to admit. Practitioner inquired if patient is fearful about being judged regarding her family life due to her uncertainty about what she wanted. Patient stated she had an idea of what she wanted but believed it was unrealistic, including financial restraints. Patient discussed her feeling of guilt, noting she would like to begin extricating herself from her family's needs but indicated she also placed herself in the center of their lives. Practitioner summarized patient's sense of exhaustion. Patient discussed her desire to vacation every few months and asked if that was feasible. Practitioner agreed. Practitioner inquired if patient felt she needed permission from her family to be herself. Patient agreed.     Patient discussed her fear of being judged by her family. Practitioner validated patient's emotions and inquired if patient's interactions with her family would be different if she followed her wants/desires. Patient denied and mentioned she would be the topic of conversation in private, which she  does not want. Practitioner advised patient to journal about the notion of family as toil (how and why it is toil) and to journal about her vision for family as not toil, if possible. Patient expressed disbelief in ability for less responsibility in family life. Practitioner briefly discussed boundaries. Patient and practitioner will meet on September 19, 2024, at 10:30 am to continue discussing patient's view of family life and avoidance of being judged/ not liked.    Topics:   Early negative learning about family life  Avoiding feeling judged  Wanting to be liked  2 senses of self  Permission to be self fully

## 2024-09-03 ENCOUNTER — OFFICE VISIT (OUTPATIENT)
Dept: OBSTETRICS AND GYNECOLOGY | Facility: CLINIC | Age: 44
End: 2024-09-03
Attending: OBSTETRICS & GYNECOLOGY
Payer: COMMERCIAL

## 2024-09-03 VITALS
DIASTOLIC BLOOD PRESSURE: 78 MMHG | WEIGHT: 201.94 LBS | BODY MASS INDEX: 34.66 KG/M2 | SYSTOLIC BLOOD PRESSURE: 132 MMHG

## 2024-09-03 DIAGNOSIS — Z12.39 SCREENING BREAST EXAMINATION: ICD-10-CM

## 2024-09-03 DIAGNOSIS — N92.1 MENORRHAGIA WITH IRREGULAR CYCLE: ICD-10-CM

## 2024-09-03 DIAGNOSIS — Z01.419 WELL WOMAN EXAM WITH ROUTINE GYNECOLOGICAL EXAM: Primary | ICD-10-CM

## 2024-09-03 PROCEDURE — 3075F SYST BP GE 130 - 139MM HG: CPT | Mod: CPTII,S$GLB,, | Performed by: OBSTETRICS & GYNECOLOGY

## 2024-09-03 PROCEDURE — 99396 PREV VISIT EST AGE 40-64: CPT | Mod: S$GLB,,, | Performed by: OBSTETRICS & GYNECOLOGY

## 2024-09-03 PROCEDURE — 3078F DIAST BP <80 MM HG: CPT | Mod: CPTII,S$GLB,, | Performed by: OBSTETRICS & GYNECOLOGY

## 2024-09-03 PROCEDURE — 99999 PR PBB SHADOW E&M-EST. PATIENT-LVL III: CPT | Mod: PBBFAC,,, | Performed by: OBSTETRICS & GYNECOLOGY

## 2024-09-03 PROCEDURE — 3044F HG A1C LEVEL LT 7.0%: CPT | Mod: CPTII,S$GLB,, | Performed by: OBSTETRICS & GYNECOLOGY

## 2024-09-03 PROCEDURE — 3008F BODY MASS INDEX DOCD: CPT | Mod: CPTII,S$GLB,, | Performed by: OBSTETRICS & GYNECOLOGY

## 2024-09-03 PROCEDURE — 1159F MED LIST DOCD IN RCRD: CPT | Mod: CPTII,S$GLB,, | Performed by: OBSTETRICS & GYNECOLOGY

## 2024-09-03 RX ORDER — NORETHINDRONE 5 MG/1
5 TABLET ORAL 3 TIMES DAILY
Qty: 30 TABLET | Refills: 1 | Status: SHIPPED | OUTPATIENT
Start: 2024-09-03

## 2024-09-03 NOTE — PROGRESS NOTES
CC: Well woman exam    Veronique Louise is a 44 y.o. female  presents for well woman exam.  LMP: No LMP recorded (lmp unknown)..    Sleep: wakes up middle of the night during the week, no issues on weekends.   Periods:  more irregular, some furthe apart.  LMP , prior , prior , April had 2 periods , .  Has had a mirena, was perforated, had surgery for removal.    Some hormone concerns, will continue to monitor as thyroid is better controlled and                                     with wegovy becomes more stabilized.  UTD colon screen, every 5, due for mmg.    Past Medical History:   Diagnosis Date    Anemia     Breast disorder     Abscess post childbirth    Gestational diabetes     Gestational with last child    HSV infection     Major depressive disorder, recurrent, unspecified 10/12/2015    Subclinical hyperthyroidism 2016     Past Surgical History:   Procedure Laterality Date    COLONOSCOPY N/A 2022    Procedure: COLONOSCOPY;  Surgeon: Albin Louise MD;  Location: [address]);  Service: Endoscopy;  Laterality: N/A;  pt requested Sutab  10/26 instructions to portal-st    EXPLORATORY LAPAROTOMY      AFTER UTERINE PERF    HYSTEROSCOPY      IUD REMOVAL WITH PERF    INCISION AND DRAINAGE OF BREAST Left     INTRAUTERINE DEVICE INSERTION      MIRENA / PARAGAURD     LAPAROSCOPIC SALPINGECTOMY Bilateral 2019    Procedure: SALPINGECTOMY, LAPAROSCOPIC;  Surgeon: Petr Foley Jr., MD;  Location: [facility];  Service: OB/GYN;  Laterality: Bilateral;    TONSILLECTOMY, ADENOIDECTOMY      TUBAL LIGATION       Social History     Socioeconomic History    Marital status:      Spouse name: Reconnex   Occupational History    Occupation: [employer]     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    Drug use: Never    Sexual activity: Yes     Partners: Male     Birth control/protection: See Surgical Hx, Other-see comments     Comment: Bilateral salpingectomy   Social History Narrative    Pt: [place]I, grew up in [place].    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    Pt's last child, Luz Marina, , choked on grape 2014. Pt does not wish to review this / visit grief at [facility] visits.     Social Determinants of Health     Financial Resource Strain: Patient Declined (2024)    Overall Financial Resource Strain (CARDIA)     Difficulty of Paying Living Expenses: Patient declined   Food Insecurity: Patient Declined (2024)    Hunger Vital Sign     Worried About Running Out of Food in the Last Year: Patient declined     Ran Out of Food in the Last Year: Patient declined   Transportation Needs: Patient Declined (2024)    PRAPARE - Transportation     Lack of Transportation (Medical): Patient declined     Lack of Transportation (Non-Medical): Patient declined   Physical Activity: Unknown (2024)    Exercise Vital Sign     Days of Exercise per Week: Patient declined   Stress: Patient Declined (2024)    [facility] [place] [facility] [employer] - Occupational Stress Questionnaire     Feeling of Stress : Patient declined   Housing Stability: Patient Declined (2024)    Housing Stability Vital Sign     Unable to Pay for Housing in the Last Year: Patient declined     Unstable Housing in the Last Year: Patient declined     Family History   Problem Relation Name Age of Onset    Diabetes Mother Mom         Type II    Hypertension Mother Mom     Arthritis Mother Mom     Colon cancer Father Dad 63        passed at 65yo [id number]    Hypertension Father Dad     Arthritis Father Dad     Hypertension Sister Sister     Other Sister          prediabetes    Dementia Maternal Grandmother      Heart attack  Maternal Grandfather      Colon cancer Paternal Grandmother      Colon cancer Paternal Grandfather          dx in 80s,  year later    Breast cancer Paternal Aunt  60    Ovarian cancer Neg Hx      Esophageal cancer Neg Hx       OB History          3    Para   3    Term   3            AB        Living   2         SAB   0    IAB   0    Ectopic        Multiple   0    Live Births   2                 /78   Wt 91.6 kg (201 lb 15.1 oz)   LMP  (LMP Unknown)   BMI 34.66 kg/m²       ROS:  GENERAL: Denies weight gain or weight loss. Feeling well overall.   SKIN: Denies rash or lesions.   HEAD: Denies head injury or headache.   NODES: Denies enlarged lymph nodes.   CHEST: Denies chest pain or shortness of breath.   CARDIOVASCULAR: Denies palpitations or left sided chest pain.   ABDOMEN: No abdominal pain, constipation, diarrhea, nausea, vomiting or rectal bleeding.   URINARY: No frequency, dysuria, hematuria, or burning on urination.  REPRODUCTIVE: See HPI.   BREASTS: The patient performs breast self-examination and denies pain, lumps, or nipple discharge.   HEMATOLOGIC: No easy bruisability or excessive bleeding.   MUSCULOSKELETAL: Denies joint pain or swelling.   NEUROLOGIC: Denies syncope or weakness.   PSYCHIATRIC: Denies depression, anxiety or mood swings.    PHYSICAL EXAM:  APPEARANCE: Well nourished, well developed, in no acute distress.  AFFECT: WNL, alert and oriented x 3  SKIN: No acne or hirsutism  NECK: Neck symmetric without masses or thyromegaly  NODES: No inguinal, cervical, axillary, or femoral lymph node enlargement  CHEST: Good respiratory effect  ABDOMEN: Soft.  No tenderness or masses.  No hepatosplenomegaly.  No hernias.  BREASTS: Symmetrical, no skin changes or visible lesions.  No palpable masses, nipple discharge bilaterally.  PELVIC: Normal external genitalia without lesions.  Normal hair distribution.  Adequate perineal body, normal urethral meatus.  Vagina moist and well  rugated without lesions or discharge.  Cervix pink, without lesions, discharge or tenderness.  No significant cystocele or rectocele.  Bimanual exam shows uterus to be normal size, regular, mobile and nontender.  Adnexa without masses or tenderness.    EXTREMITIES: No edema.    Well woman exam with routine gynecological exam    Screening breast examination  -     Mammo Digital Screening Bilat w/ Jose; Future; Expected date: 09/03/2024    Menorrhagia with irregular cycle  -     norethindrone (AYGESTIN) 5 mg Tab; Take 1 tablet (5 mg total) by mouth 3 (three) times daily.  Dispense: 30 tablet; Refill: 1            Patient was counseled today on A.C.S. Pap guidelines and recommendations for yearly pelvic exams, mammograms and monthly self breast exams; to see her PCP for other health maintenance.     No follow-ups on file.

## 2024-09-05 ENCOUNTER — OFFICE VISIT (OUTPATIENT)
Dept: DERMATOLOGY | Facility: CLINIC | Age: 44
End: 2024-09-05
Payer: COMMERCIAL

## 2024-09-05 DIAGNOSIS — Z12.83 SCREENING EXAM FOR SKIN CANCER: ICD-10-CM

## 2024-09-05 DIAGNOSIS — L81.4 LENTIGO: ICD-10-CM

## 2024-09-05 DIAGNOSIS — D18.01 ANGIOMA OF SKIN: ICD-10-CM

## 2024-09-05 DIAGNOSIS — D22.9 MULTIPLE BENIGN NEVI: ICD-10-CM

## 2024-09-05 DIAGNOSIS — D48.5 NEOPLASM OF UNCERTAIN BEHAVIOR OF SKIN: Primary | ICD-10-CM

## 2024-09-05 DIAGNOSIS — L82.1 SEBORRHEIC KERATOSES: ICD-10-CM

## 2024-09-05 PROCEDURE — 3044F HG A1C LEVEL LT 7.0%: CPT | Mod: CPTII,S$GLB,, | Performed by: DERMATOLOGY

## 2024-09-05 PROCEDURE — 1160F RVW MEDS BY RX/DR IN RCRD: CPT | Mod: CPTII,S$GLB,, | Performed by: DERMATOLOGY

## 2024-09-05 PROCEDURE — 99213 OFFICE O/P EST LOW 20 MIN: CPT | Mod: 25,S$GLB,, | Performed by: DERMATOLOGY

## 2024-09-05 PROCEDURE — 11102 TANGNTL BX SKIN SINGLE LES: CPT | Mod: S$GLB,,, | Performed by: DERMATOLOGY

## 2024-09-05 PROCEDURE — 1159F MED LIST DOCD IN RCRD: CPT | Mod: CPTII,S$GLB,, | Performed by: DERMATOLOGY

## 2024-09-05 NOTE — PATIENT INSTRUCTIONS
Biopsy Wound Care Instructions    Leave the bandage on for 24 hours without getting it wet.   Clean the area once a day with a gentle soap and water, then pat dry and apply Vaseline and a bandaid.  The site should be kept moist with Vaseline at all times to improve healing. Reapply a thick coating as needed. Do not let the site air out or form a scab, as this will delay healing and worsen scarring.  If any bleeding or oozing occurs once you return home, apply firm pressure to the area for 30 minutes straight without peeking. If bleeding continues, call the office immediately.  Please message us via [facility], call us at [phone], or return to the office at any sign of increasing redness, swelling, tenderness, pain, heat, yellow drainage/discharge, or continued bleeding.      Receiving Your Pathology Results    Your pathology results will be released to you on [facility] at the same time that Dr. Solis receives them.   Dr. Solis will then message you with her interpretation of the results and/or with the plan going forward.  If you do not use [facility] or if your pathology results require more of an explanation, you will receive your results via a phone call.  If 2 weeks go by and you have not received your results, please message us via [facility] or call us at [phone] to inform us.            Hydrocolloid Bandage    These bandages can be found at your local pharmacy in first [employer] or [employer].  Apply the bandage to clean, dry skin. Press and hold top of bandage once it is applied to warm the bandage.  Please note: There area where bandage adheres to wound bed will become white and puffy; this is not infection and a normal part of the healing process.  Do not change bandage until edges roll up.  When bandage is ready to be changed, remove carefully and slowly.  Wash wound with gentle cleanser and fingertips.  Make sure area is completely dry before applying new bandage.  Make sure wound bed is  in the center of the bandage.  Repeat process until fresh pink skin covers wound bed or until bandage no longer become white and puffy.

## 2024-09-05 NOTE — PROGRESS NOTES
"  Patient Information  Name: Veronique Louise  : 1980  MRN: [id number]     Referring Physician:  No ref. provider found   Primary Care Physician:  González Alves MD   Date of Visit: 2024      Subjective:     History of Present lllness:    Veronique Louise is a 44 y.o. female who presents with a chief complaint of moles.  Patient is here today for a "mole" check.     Today, patient complains of lesion(s):  Location: upper lip  Duration: few mths  Symptoms: red mole, no pain or bleeding, it has grown  Relieving factors/Previous treatments: none    Location: R leg  Duration: years  Signs/Symptoms: mole, no pain or growth  Relieving factors/Prior treatments: none    Clinical documentation obtained by nursing staff reviewed.    Review of Systems    Objective:   Physical Exam   Constitutional: She appears well-developed and well-nourished. No distress.   Neurological: She is alert and oriented to person, place, and time. She is not disoriented.   Psychiatric: She has a normal mood and affect.   Skin:   Areas Examined (abnormalities noted in diagram):   Scalp / Hair Palpated and Inspected  Head / Face Inspection Performed  Neck Inspection Performed  Chest / Axilla Inspection Performed  Abdomen Inspection Performed  Genitals / Buttocks / Groin Inspection Performed  Back Inspection Performed  RUE Inspected  LUE Inspection Performed  RLE Inspected  LLE Inspection Performed  Nails and Digits Inspection Performed                 Diagram Legend     Erythematous scaling macule/papule c/w actinic keratosis       Vascular papule c/w angioma      Pigmented verrucoid papule/plaque c/w seborrheic keratosis      Yellow umbilicated papule c/w sebaceous hyperplasia      Irregularly shaped tan macule c/w lentigo     1-2 mm smooth white papules consistent with Milia      Movable subcutaneous cyst with punctum c/w epidermal inclusion cyst      Subcutaneous movable cyst c/w pilar cyst      Firm pink to brown " papule c/w dermatofibroma      Pedunculated fleshy papule(s) c/w skin tag(s)      Evenly pigmented macule c/w junctional nevus     Mildly variegated pigmented, slightly irregular-bordered macule c/w mildly atypical nevus      Flesh colored to evenly pigmented papule c/w intradermal nevus       Pink pearly papule/plaque c/w basal cell carcinoma      Erythematous hyperkeratotic cursted plaque c/w SCC      Surgical scar with no sign of skin cancer recurrence      Open and closed comedones      Inflammatory papules and pustules      Verrucoid papule consistent consistent with wart     Erythematous eczematous patches and plaques     Dystrophic onycholytic nail with subungual debris c/w onychomycosis     Umbilicated papule    Erythematous-base heme-crusted tan verrucoid plaque consistent with inflamed seborrheic keratosis     Erythematous Silvery Scaling Plaque c/w Psoriasis     See annotation          [] Data reviewed  [] Prior external notes reviewed  [] Independent review of test  [] Management discussed with another provider  [] Independent historian    Assessment / Plan:      Pathology Orders:       Normal Orders This Visit    Specimen to Pathology, Dermatology     Questions:    Procedure Type: Dermatology and skin neoplasms    Number of Specimens: 1    ------------------------: -------------------------    Spec 1 Procedure: Biopsy    Spec 1 Clinical Impression: r/o blue nevus vs pigmented SK vs melanoma    Spec 1 Source: right upper back    Release to patient:           Neoplasm of uncertain behavior of skin  -     Specimen to Pathology, Dermatology    Shave biopsy procedure note:  Risk, benefits, and alternatives of biopsy are discussed with the patient, including risk of infection, scar, recurrence, and need for additional treatment of site. The patient agrees to the procedure by verbal consent. The area is marked and prepped with alcohol.  Approximately 1 mL of lidocaine 1% with epinephrine is used for local  anesthesia. A sharp blade is used to remove the lesion. The specimen is sent for pathology. Hemostasis is obtained with aluminum chloride and/or monopolar hyfrecation if needed. The area is then dressed and bandaged. The patient tolerated the procedure well without adverse event. Written instructions on wound care were given and were reviewed with the patient, who is to call for any signs of bleeding or infection. The patient will be notified of the pathology results.    Multiple benign nevi  Multiple benign-appearing nevi present on exam today. Reassurance provided. Counseled patient to periodically examine moles and return to clinic if any changes in size, shape, or color are noted or if it becomes symptomatic (bleeding, itching, pain, etc).  Recommend using a broad-spectrum, water-resistant sunscreen with SPF of 30 or higher--reapply every 2 hours. Seek shade, wear sun-protective clothing, and perform regular skin self-exams.    Lentigo  These are benign sun spots which should be monitored for changes. Daily sun protection will reduce the number of new lesions.   Recommend using a broad-spectrum, water-resistant sunscreen with SPF of 30 or higher--reapply every 2 hours. Seek shade, wear sun-protective clothing, and perform regular skin self-exams.    Angioma of skin  These are benign vascular lesions that are inherited. Treatment is not necessary.  If tx is desired, recommended pulse dye laser (PDL).    Seborrheic keratoses  These are benign, inherited growths without a malignant potential. Reassurance given to patient. No treatment is necessary.    Screening exam for skin cancer  Total body skin examination performed today as noted in physical exam. Suspicious lesion(s) were noted and/or biopsied as above.  Recommend using a broad-spectrum, water-resistant sunscreen with SPF of 30 or higher--reapply every 2 hours. Seek shade, wear sun-protective clothing, and perform regular skin self-exams.      Follow up  dependent on pathology results.      Ruby Solis MD, FAAD  [facility]

## 2024-09-25 ENCOUNTER — HOSPITAL ENCOUNTER (OUTPATIENT)
Dept: RADIOLOGY | Facility: OTHER | Age: 44
Discharge: HOME | End: 2024-09-25
Attending: OBSTETRICS & GYNECOLOGY
Payer: COMMERCIAL

## 2024-09-25 DIAGNOSIS — Z12.31 ENCOUNTER FOR SCREENING MAMMOGRAM FOR BREAST CANCER: ICD-10-CM

## 2024-09-25 PROCEDURE — 77063 BREAST TOMOSYNTHESIS BI: CPT | Mod: TC

## 2024-09-25 PROCEDURE — 77067 SCR MAMMO BI INCL CAD: CPT | Mod: TC

## 2024-10-02 ENCOUNTER — TELEPHONE (OUTPATIENT)
Dept: INTERNAL MEDICINE | Facility: CLINIC | Age: 44
End: 2024-10-02
Payer: COMMERCIAL

## 2024-10-02 NOTE — TELEPHONE ENCOUNTER
----- Message from Med Assistant Liriano sent at 10/2/2024 10:20 AM CDT -----  Name of Who is Calling:TAMAR BARAKAT [[id number]]                What is the request in detail: Pt is requesting a call back to see if the flu shot is available in the office. Please assist.                Can the clinic reply by [facility]: No                [facility] [place] [facility] [place] [facility] [phone]

## 2024-10-03 DIAGNOSIS — M25.561 RIGHT KNEE PAIN, UNSPECIFIED CHRONICITY: Primary | ICD-10-CM

## 2024-10-07 ENCOUNTER — OFFICE VISIT (OUTPATIENT)
Dept: SPORTS MEDICINE | Facility: CLINIC | Age: 44
End: 2024-10-07
Payer: COMMERCIAL

## 2024-10-07 ENCOUNTER — APPOINTMENT (OUTPATIENT)
Dept: RADIOLOGY | Facility: OTHER | Age: 44
End: 2024-10-07
Attending: PHYSICIAN ASSISTANT
Payer: COMMERCIAL

## 2024-10-07 VITALS
HEIGHT: 64 IN | HEART RATE: 71 BPM | WEIGHT: 205.38 LBS | DIASTOLIC BLOOD PRESSURE: 82 MMHG | SYSTOLIC BLOOD PRESSURE: 136 MMHG | BODY MASS INDEX: 35.06 KG/M2

## 2024-10-07 DIAGNOSIS — G89.29 CHRONIC PAIN OF RIGHT KNEE: Primary | ICD-10-CM

## 2024-10-07 DIAGNOSIS — M25.561 RIGHT KNEE PAIN, UNSPECIFIED CHRONICITY: ICD-10-CM

## 2024-10-07 DIAGNOSIS — M25.561 CHRONIC PAIN OF RIGHT KNEE: Primary | ICD-10-CM

## 2024-10-07 PROCEDURE — 99999 PR PBB SHADOW E&M-EST. PATIENT-LVL IV: CPT | Mod: PBBFAC,,, | Performed by: PHYSICIAN ASSISTANT

## 2024-10-07 PROCEDURE — 99203 OFFICE O/P NEW LOW 30 MIN: CPT | Mod: S$GLB,,, | Performed by: PHYSICIAN ASSISTANT

## 2024-10-07 PROCEDURE — 1160F RVW MEDS BY RX/DR IN RCRD: CPT | Mod: CPTII,S$GLB,, | Performed by: PHYSICIAN ASSISTANT

## 2024-10-07 PROCEDURE — 3044F HG A1C LEVEL LT 7.0%: CPT | Mod: CPTII,S$GLB,, | Performed by: PHYSICIAN ASSISTANT

## 2024-10-07 PROCEDURE — 1159F MED LIST DOCD IN RCRD: CPT | Mod: CPTII,S$GLB,, | Performed by: PHYSICIAN ASSISTANT

## 2024-10-07 PROCEDURE — 3008F BODY MASS INDEX DOCD: CPT | Mod: CPTII,S$GLB,, | Performed by: PHYSICIAN ASSISTANT

## 2024-10-07 PROCEDURE — 73564 X-RAY EXAM KNEE 4 OR MORE: CPT | Mod: 26,50,, | Performed by: RADIOLOGY

## 2024-10-07 PROCEDURE — 73564 X-RAY EXAM KNEE 4 OR MORE: CPT | Mod: TC,50,PN

## 2024-10-07 PROCEDURE — 3079F DIAST BP 80-89 MM HG: CPT | Mod: CPTII,S$GLB,, | Performed by: PHYSICIAN ASSISTANT

## 2024-10-07 PROCEDURE — 3075F SYST BP GE 130 - 139MM HG: CPT | Mod: CPTII,S$GLB,, | Performed by: PHYSICIAN ASSISTANT

## 2024-10-07 NOTE — PROGRESS NOTES
NEW PATIENT    HISTORY OF PRESENT ILLNESS   44 y.o. Female with a history of right knee pain who is an employee of [employer] and works from home.  She began having lateral-sided knee pain after bending down to  something off the floor and standing up 1 month ago.  She felt a twinge in the knee initially but had significant pain throughout the evening.  She had difficulty ambulating for several days and noticed increased swelling.  She had to ambulate with a cane.  After beginning conservative treatment including rest from physical activities with frequent icing and oral NSAIDs, her symptoms improved but never went away.  This relief only lasted a few days and her symptoms quickly returned.  She continues to have lateral-sided pain particularly when the knee is flexed.  She enjoys running and working out with kettle bells and has been unable to do so because of her continued knee pain.        + mechanical symptoms, - instability    Is affecting ADLs.  Pain is 10/10 at it's worst.        PAST MEDICAL HISTORY    Past Medical History:   Diagnosis Date    Anemia     Breast disorder     Abscess post childbirth    Gestational diabetes     Gestational with last child    HSV infection     Major depressive disorder, recurrent, unspecified 10/12/2015    Subclinical hyperthyroidism 12/19/2016       PAST SURGICAL HISTORY     Past Surgical History:   Procedure Laterality Date    COLONOSCOPY N/A 11/30/2022    Procedure: COLONOSCOPY;  Surgeon: Albin Louise MD;  Location: [facility] ([address] [place] [address]);  Service: Endoscopy;  Laterality: N/A;  pt requested Sutab  10/26 instructions to portal-st    EXPLORATORY LAPAROTOMY      AFTER UTERINE PERF    HYSTEROSCOPY      IUD REMOVAL WITH PERF    INCISION AND DRAINAGE OF BREAST Left 2015    INTRAUTERINE DEVICE INSERTION  2011    MIRENA / PARAGAURD     LAPAROSCOPIC SALPINGECTOMY Bilateral 12/26/2019    Procedure: SALPINGECTOMY, LAPAROSCOPIC;  Surgeon: Petr Foley Jr., MD;  Location: [facility]  OR;  Service: OB/GYN;  Laterality: Bilateral;    TONSILLECTOMY, ADENOIDECTOMY      TUBAL LIGATION         FAMILY HISTORY    Family History   Problem Relation Name Age of Onset    Diabetes Mother Mom         Type II    Hypertension Mother Mom     Arthritis Mother Mom     Colon cancer Father Dad 63        passed at 63yo [id number]    Hypertension Father Dad     Arthritis Father Dad     Hypertension Sister Sister     Other Sister          prediabetes    Dementia Maternal Grandmother      Heart attack Maternal Grandfather      Colon cancer Paternal Grandmother      Colon cancer Paternal Grandfather          dx in 80s,  year later    Breast cancer Paternal Aunt  60    Ovarian cancer Neg Hx      Esophageal cancer Neg Hx         SOCIAL HISTORY    Social History     Socioeconomic History    Marital status:      Spouse name: FiveCubits   Occupational History    Occupation: [employer]     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    Drug use: Never    Sexual activity: Yes     Partners: Male     Birth control/protection: See Surgical Hx, Other-see comments     Comment: Bilateral salpingectomy   Social History Narrative    Pt: [employer], grew up in [place].    : [place] - in [place] [employer], grew up rurally.    Pt's last child, Luz Marina, , choked on grape 2014. Pt does not wish to review this / visit grief at [facility] visits.     Social Drivers of Health     Financial Resource Strain: Patient Declined (2024)    Overall Financial Resource Strain (CARDIA)     Difficulty of Paying Living Expenses: Patient declined   Food Insecurity: Patient Declined (2024)    Hunger Vital Sign     Worried About Running Out of Food in the Last Year: Patient declined     Ran  Out of Food in the Last Year: Patient declined   Transportation Needs: Patient Declined (1/16/2024)    PRAPARE - Transportation     Lack of Transportation (Medical): Patient declined     Lack of Transportation (Non-Medical): Patient declined   Physical Activity: Unknown (1/16/2024)    Exercise Vital Sign     Days of Exercise per Week: Patient declined   Stress: Patient Declined (1/16/2024)    [employer] - Occupational Stress Questionnaire     Feeling of Stress : Patient declined   Housing Stability: Patient Declined (1/16/2024)    Housing Stability Vital Sign     Unable to Pay for Housing in the Last Year: Patient declined     Unstable Housing in the Last Year: Patient declined       MEDICATIONS      Current Outpatient Medications:     busPIRone (BUSPAR) 10 MG tablet, Take 1 tablet (10 mg) by mouth 2 times per day, Disp: 180 tablet, Rfl: 3    busPIRone (BUSPAR) 10 MG tablet, Take 1 tablet (10 mg) by mouth 3 times per day, Disp: 90 tablet, Rfl: 3    lisdexamfetamine (VYVANSE) 40 MG Cap, Take 40 mg by mouth every morning., Disp: , Rfl:     lurasidone (LATUDA) 40 mg Tab tablet, Take 1 tablet (40 mg) by mouth daily with food, Disp: 90 tablet, Rfl: 3    lurasidone (LATUDA) 40 mg Tab tablet, Take 1 tablet (40 mg) by mouth daily with food, Disp: 30 tablet, Rfl: 3    lurasidone (LATUDA) 40 mg Tab tablet, Take 1 tablet (40 mg) by mouth daily with food, Disp: 90 tablet, Rfl: 3    semaglutide, weight loss, (WEGOVY) 0.5 mg/0.5 mL PnIj, Inject 0.5 mg into the skin every 7 days., Disp: 2 mL, Rfl: 1    traZODone (DESYREL) 50 MG tablet, Take 1 tablet (50 mg) by mouth daily at bedtime as needed, Disp: 90 tablet, Rfl: 3    valACYclovir (VALTREX) 1000 MG tablet, Take 1 tablet (1,000 mg total) by mouth once daily., Disp: 90 tablet, Rfl: 3    norethindrone (AYGESTIN) 5 mg Tab, Take 1 tablet (5 mg total) by mouth 3 (three) times daily. (Patient not taking: Reported on 10/7/2024), Disp: 30 tablet, Rfl:  "1    ALLERGIES     Review of patient's allergies indicates:   Allergen Reactions    Percocet [oxycodone-acetaminophen] Nausea And Vomiting     Causes extreme N/V. OK to take Hydrocodone.          REVIEW OF SYSTEMS   Constitution: Negative. Negative for chills, fever and night sweats.   HENT: Negative for congestion and headaches.    Eyes: Negative for blurred vision, left vision loss and right vision loss.   Cardiovascular: Negative for chest pain and syncope.   Respiratory: Negative for cough and shortness of breath.    Endocrine: Negative for polydipsia, polyphagia and polyuria.   Hematologic/Lymphatic: Negative for bleeding problem. Does not bruise/bleed easily.   Skin: Negative for dry skin, itching and rash.   Musculoskeletal: Negative for falls. Positive for right knee pain and swelling.  Gastrointestinal: Negative for abdominal pain and bowel incontinence.   Genitourinary: Negative for bladder incontinence and nocturia.   Neurological: Negative for disturbances in coordination, loss of balance and seizures.   Psychiatric/Behavioral: Negative for depression. The patient does not have insomnia.    Allergic/Immunologic: Negative for hives and persistent infections.     PHYSICAL EXAMINATION    Vitals: /82   Pulse 71   Ht 5' 4" (1.626 m)   Wt 93.2 kg (205 lb 5.7 oz)   BMI 35.25 kg/m²     General: The patient appears active and healthy with no apparent physical problems.  No disturbance of mood or affect is demonstrated. Alert and Oriented.    HEENT: Eyes normal, pupils equally round, nose normal.    Resp: Equal and symmetrical chest rises. No wheezing    CV: Regular rate    Neck: Supple; nonpainful range of motion.    Extremities: no cyanosis, clubbing, edema, or diffuse swelling.  Palpable pulses, good capillary refill of the digits.  No coolness, discoloration, edema or obvious varicosities.    Skin: no lesions noted.    Lymphatic: no detected adenopathy in the upper or lower " extremities.    Neurologic: normal mental status, normal reflexes, normal gait and balance.  Patient is alert and oriented to person, place and time.  No flaccidity or spasticity is noted.  No motor or sensory deficits are noted.  Light touch is intact    Orthopaedic: KNEE EXAM - RIGHT    Inspection:   Normal skin color and appearance with no scars, no ecchymosis + trace effusion.      ROM:   0° - 145°.      Palpation:   There is no tenderness along medial joint line, medial plica, medial collateral ligament, pes bursa, iliotibial band, lateral collateral ligament, popliteal fossa, patellar tendon, or quadriceps tendon.  There is tenderness to palpation along the lateral joint line.    Stability: - anterior drawer, - Lachman, - pivot shift and - posterior drawer.      No instability with varus or valgus stress at 0° or 30°. Negative dial  test at 30° and 90°.    Tests:   - Eugenios test.  - patellar compression - grind test, - crepitus.  There is no patellar apprehension. - J Sign. - Inocencia's. - patellar tilt. . Lateral patella translation  2 quadrants. Medial patella translation 2 quadrants    Motor:   Quadriceps strength is 5/5 and hamstrings strength is 5/5. Hamstrings show no tightness.      Neuro:   Distal neurovascular status is normal    Vascular: Negative Homans and no palpable popliteal cords. 2+ pedal pulse with brisk cap refill    Gait: Antalgic without assistive device      IMAGING    X-ray Knee Ortho Bilateral with Flexion  Narrative: EXAMINATION:  XR KNEE ORTHO BILAT WITH FLEXION    CLINICAL HISTORY:  Pain in right knee    TECHNIQUE:  AP standing of both knees, PA flexion standing views of both knees, and Merchant views of both knees were performed.  Lateral views of both knees were also performed.    COMPARISON:  None    FINDINGS:  No acute fracture or dislocation seen.    Joint spaces are fairly well preserved with no significant osteophyte formation.    No significant soft tissue edema or  suprapatellar joint effusion.  Impression: No acute osseous abnormality seen.    Electronically signed by: Meka Lora  Date:    10/07/2024  Time:    10:45        IMPRESSION       ICD-10-CM ICD-9-CM   1. Chronic pain of right knee  M25.561 719.46    G89.29 338.29       MEDICATIONS PRESCRIBED      None    RECOMMENDATIONS     MRI right knee for evaluation of the lateral meniscus  Activity modifications were given  Encouraged frequent icing and elevation as needed for pain and swelling  Ibuprofen 800 mg every 8 hours as needed for pain and swelling  Return to clinic for MRI results    All of their questions were answered.  They will call the clinic with any questions or concerns in the interim.     Should the patient's symptoms worsen, persist, or fail to improve they should return for reevaluation and I would be happy to see them back anytime.                  Cruz Vasquez PA-C       Please be aware that this note has been generated with the assistance of [employer] voice-to-text.  Please excuse any spelling or grammatical errors.     Thank you for choosing Cruz Vasquez PA-C for your sports medicine care. It is our goal to provide you with exceptional care that will help keep you healthy, active, and get you back in the game.     If you felt that you received exemplary care today, please consider leaving feedback for Mr. Pedro PA-C on [employer] at [email or web address]       Please do not hesitate to reach out to us via email, phone, or MyChart with any questions, concerns, or feedback.

## 2024-10-08 NOTE — PROGRESS NOTES
Patient ID: Veronique Louise is a 44 y.o. Black or  female    Subjective  Chief Complaint: patient presents for medical weight loss management.    Co-morbidities: none    HPI: Patient continued Wegovy with [facility] in July 2024 and is currently managed on Wegovy 0.5 mg. Pt reports improvements in dietary habits as pt was struggling with intake at initiation with [facility].    Tolerance to current therapy:  Denies nausea, vomiting, diarrhea, constipation, abdominal pain    Weight loss history:  Starting weight:    6/10/2024   Recent Readings    Weight (lbs) 209 lb    BMI 35.87 BMI    Current weight:    10/5/2024   Recent Readings    Weight (lbs) 199 lb    BMI 34.15 BMI    % weight loss since GLP-1 initiation: 4.8 %    Objective  Lab Results   Component Value Date     05/14/2024     04/09/2024     05/02/2023     Lab Results   Component Value Date    K 4.3 05/14/2024    K 4.4 04/09/2024    K 3.8 05/02/2023     Lab Results   Component Value Date     05/14/2024     04/09/2024     05/02/2023     Lab Results   Component Value Date    CO2 27 05/14/2024    CO2 25 04/09/2024    CO2 29 05/02/2023     Lab Results   Component Value Date    BUN 12 05/14/2024    BUN 14 04/09/2024    BUN 9 05/02/2023     Lab Results   Component Value Date    GLU 88 05/14/2024    GLU 85 04/09/2024    GLU 95 05/02/2023     Lab Results   Component Value Date    CALCIUM 9.3 05/14/2024    CALCIUM 8.9 04/09/2024    CALCIUM 9.0 05/02/2023     Lab Results   Component Value Date    PROT 7.5 05/14/2024    PROT 7.1 04/09/2024    PROT 7.1 05/02/2023     Lab Results   Component Value Date    ALBUMIN 3.8 05/14/2024    ALBUMIN 3.5 04/09/2024    ALBUMIN 3.8 05/02/2023     Lab Results   Component Value Date    BILITOT 0.3 05/14/2024    BILITOT 0.3 04/09/2024    BILITOT 0.3 05/02/2023     Lab Results   Component Value Date    AST 13 05/14/2024    AST 13 04/09/2024    AST 17  05/02/2023     Lab Results   Component Value Date    ALT 12 05/14/2024    ALT 16 04/09/2024    ALT 20 05/02/2023     Lab Results   Component Value Date    ANIONGAP 10 05/14/2024    ANIONGAP 8 04/09/2024    ANIONGAP 5 (L) 05/02/2023     Lab Results   Component Value Date    CREATININE 0.7 05/14/2024    CREATININE 0.7 04/09/2024    CREATININE 0.7 05/02/2023     Lab Results   Component Value Date    EGFRNORACEVR >60 05/14/2024    EGFRNORACEVR >60 04/09/2024    EGFRNORACEVR >60 05/02/2023     Assessment/Plan  - Increase dose to Wegovy 1 mg SQ weekly; pt requires extended dosing titrations due to tolerability  -RTC in 3 months to assess progress    Patient consented to pharmacist management via collaborative practice.

## 2024-10-09 ENCOUNTER — PATIENT MESSAGE (OUTPATIENT)
Dept: INTERNAL MEDICINE | Facility: CLINIC | Age: 44
End: 2024-10-09

## 2024-10-09 ENCOUNTER — OFFICE VISIT (OUTPATIENT)
Dept: INTERNAL MEDICINE | Facility: CLINIC | Age: 44
End: 2024-10-09
Payer: COMMERCIAL

## 2024-10-09 DIAGNOSIS — E66.9 OBESITY, UNSPECIFIED CLASS, UNSPECIFIED OBESITY TYPE, UNSPECIFIED WHETHER SERIOUS COMORBIDITY PRESENT: Primary | ICD-10-CM

## 2024-10-09 PROCEDURE — 99499 UNLISTED E&M SERVICE: CPT | Mod: 95,,,

## 2024-10-09 RX ORDER — SEMAGLUTIDE 1 MG/.5ML
1 INJECTION, SOLUTION SUBCUTANEOUS
Qty: 2 ML | Refills: 2 | Status: ACTIVE | OUTPATIENT
Start: 2024-10-09

## 2024-10-16 ENCOUNTER — HOSPITAL ENCOUNTER (OUTPATIENT)
Dept: RADIOLOGY | Facility: OTHER | Age: 44
Discharge: HOME | End: 2024-10-16
Attending: PHYSICIAN ASSISTANT
Payer: COMMERCIAL

## 2024-10-16 DIAGNOSIS — G89.29 CHRONIC PAIN OF RIGHT KNEE: ICD-10-CM

## 2024-10-16 DIAGNOSIS — M25.561 CHRONIC PAIN OF RIGHT KNEE: ICD-10-CM

## 2024-10-16 PROCEDURE — 73721 MRI JNT OF LWR EXTRE W/O DYE: CPT | Mod: 26,RT,, | Performed by: INTERNAL MEDICINE

## 2024-10-16 PROCEDURE — 73721 MRI JNT OF LWR EXTRE W/O DYE: CPT | Mod: TC,RT

## 2024-10-21 ENCOUNTER — OFFICE VISIT (OUTPATIENT)
Dept: SPORTS MEDICINE | Facility: CLINIC | Age: 44
End: 2024-10-21
Payer: COMMERCIAL

## 2024-10-21 VITALS
WEIGHT: 205.5 LBS | DIASTOLIC BLOOD PRESSURE: 80 MMHG | SYSTOLIC BLOOD PRESSURE: 142 MMHG | BODY MASS INDEX: 35.08 KG/M2 | HEART RATE: 61 BPM | HEIGHT: 64 IN

## 2024-10-21 DIAGNOSIS — M22.41 CHONDROMALACIA, PATELLA, RIGHT: ICD-10-CM

## 2024-10-21 DIAGNOSIS — S83.281A ACUTE LATERAL MENISCUS TEAR OF RIGHT KNEE, INITIAL ENCOUNTER: Primary | ICD-10-CM

## 2024-10-21 PROCEDURE — 1160F RVW MEDS BY RX/DR IN RCRD: CPT | Mod: CPTII,S$GLB,, | Performed by: PHYSICIAN ASSISTANT

## 2024-10-21 PROCEDURE — 1159F MED LIST DOCD IN RCRD: CPT | Mod: CPTII,S$GLB,, | Performed by: PHYSICIAN ASSISTANT

## 2024-10-21 PROCEDURE — 3077F SYST BP >= 140 MM HG: CPT | Mod: CPTII,S$GLB,, | Performed by: PHYSICIAN ASSISTANT

## 2024-10-21 PROCEDURE — 3044F HG A1C LEVEL LT 7.0%: CPT | Mod: CPTII,S$GLB,, | Performed by: PHYSICIAN ASSISTANT

## 2024-10-21 PROCEDURE — 99999 PR PBB SHADOW E&M-EST. PATIENT-LVL III: CPT | Mod: PBBFAC,,, | Performed by: PHYSICIAN ASSISTANT

## 2024-10-21 PROCEDURE — 3008F BODY MASS INDEX DOCD: CPT | Mod: CPTII,S$GLB,, | Performed by: PHYSICIAN ASSISTANT

## 2024-10-21 PROCEDURE — 3079F DIAST BP 80-89 MM HG: CPT | Mod: CPTII,S$GLB,, | Performed by: PHYSICIAN ASSISTANT

## 2024-10-21 PROCEDURE — 99214 OFFICE O/P EST MOD 30 MIN: CPT | Mod: S$GLB,,, | Performed by: PHYSICIAN ASSISTANT

## 2024-10-21 RX ORDER — MELOXICAM 15 MG/1
15 TABLET ORAL DAILY PRN
Qty: 30 TABLET | Refills: 1 | Status: SHIPPED | OUTPATIENT
Start: 2024-10-21

## 2024-10-21 NOTE — PROGRESS NOTES
ESTABLISHED PATIENT    History 10/21/2024:  Veronique returns here today for follow-up evaluation of her right knee and to discuss her MRI results.    History 10/7/2024:  44 y.o. Female with a history of right knee pain who is an employee of [employer] and works from home.  She began having lateral-sided knee pain after bending down to  something off the floor and standing up 1 month ago.  She felt a twinge in the knee initially but had significant pain throughout the evening.  She had difficulty ambulating for several days and noticed increased swelling.  She had to ambulate with a cane.  After beginning conservative treatment including rest from physical activities with frequent icing and oral NSAIDs, her symptoms improved but never went away.  This relief only lasted a few days and her symptoms quickly returned.  She continues to have lateral-sided pain particularly when the knee is flexed.  She enjoys running and working out with kettle bells and has been unable to do so because of her continued knee pain.        + mechanical symptoms, - instability    Is affecting ADLs.  Pain is 10/10 at it's worst.        PAST MEDICAL HISTORY    Past Medical History:   Diagnosis Date    Anemia     Breast disorder     Abscess post childbirth    Gestational diabetes     Gestational with last child    HSV infection     Major depressive disorder, recurrent, unspecified 10/12/2015    Subclinical hyperthyroidism 12/19/2016       PAST SURGICAL HISTORY     Past Surgical History:   Procedure Laterality Date    COLONOSCOPY N/A 11/30/2022    Procedure: COLONOSCOPY;  Surgeon: Albin Louise MD;  Location: [facility] ([address] [place] [address]);  Service: Endoscopy;  Laterality: N/A;  pt requested Sutab  10/26 instructions to portal-st    EXPLORATORY LAPAROTOMY      AFTER UTERINE PERF    HYSTEROSCOPY      IUD REMOVAL WITH PERF    INCISION AND DRAINAGE OF BREAST Left 2015    INTRAUTERINE DEVICE INSERTION  2011    MIRENA / PARAGAURD     LAPAROSCOPIC  SALPINGECTOMY Bilateral 2019    Procedure: SALPINGECTOMY, LAPAROSCOPIC;  Surgeon: Petr Foley Jr., MD;  Location: [facility];  Service: OB/GYN;  Laterality: Bilateral;    TONSILLECTOMY, ADENOIDECTOMY      TUBAL LIGATION         FAMILY HISTORY    Family History   Problem Relation Name Age of Onset    Diabetes Mother Mom         Type II    Hypertension Mother Mom     Arthritis Mother Mom     Colon cancer Father Dad 63        passed at 63yo [id number]    Hypertension Father Dad     Arthritis Father Dad     Hypertension Sister Sister     Other Sister          prediabetes    Dementia Maternal Grandmother      Heart attack Maternal Grandfather      Colon cancer Paternal Grandmother      Colon cancer Paternal Grandfather          dx in 80s,  year later    Breast cancer Paternal Aunt  60    Ovarian cancer Neg Hx      Esophageal cancer Neg Hx         SOCIAL HISTORY    Social History     Socioeconomic History    Marital status:      Spouse name: [employer]   Occupational History    Occupation: [employer]     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    Drug use: Never    Sexual activity: Yes     Partners: Male     Birth control/protection: See Surgical Hx, Other-see comments     Comment: Bilateral salpingectomy   Social History Narrative    Pt: [place], grew up in [place].    : [employer] - in [employer], grew up rurally.    Pt's last child, Luz Marina, , choked on grape 2014. Pt does not wish to review this / visit grief at [facility] visits.     Social Drivers of Health     Financial Resource Strain: Patient Declined (2024)    Overall Financial Resource Strain (CARDIA)     Difficulty of Paying Living Expenses: Patient declined   Food Insecurity:  Patient Declined (1/16/2024)    Hunger Vital Sign     Worried About Running Out of Food in the Last Year: Patient declined     Ran Out of Food in the Last Year: Patient declined   Transportation Needs: Patient Declined (1/16/2024)    PRAPARE - Transportation     Lack of Transportation (Medical): Patient declined     Lack of Transportation (Non-Medical): Patient declined   Physical Activity: Unknown (1/16/2024)    Exercise Vital Sign     Days of Exercise per Week: Patient declined   Stress: Patient Declined (1/16/2024)    [employer] - Occupational Stress Questionnaire     Feeling of Stress : Patient declined   Housing Stability: Patient Declined (1/16/2024)    Housing Stability Vital Sign     Unable to Pay for Housing in the Last Year: Patient declined     Unstable Housing in the Last Year: Patient declined       MEDICATIONS      Current Outpatient Medications:     busPIRone (BUSPAR) 10 MG tablet, Take 1 tablet (10 mg) by mouth 2 times per day, Disp: 180 tablet, Rfl: 3    busPIRone (BUSPAR) 10 MG tablet, Take 1 tablet (10 mg) by mouth 3 times per day, Disp: 90 tablet, Rfl: 3    lisdexamfetamine (VYVANSE) 40 MG Cap, Take 40 mg by mouth every morning., Disp: , Rfl:     lurasidone (LATUDA) 40 mg Tab tablet, Take 1 tablet (40 mg) by mouth daily with food, Disp: 90 tablet, Rfl: 3    lurasidone (LATUDA) 40 mg Tab tablet, Take 1 tablet (40 mg) by mouth daily with food, Disp: 30 tablet, Rfl: 3    lurasidone (LATUDA) 40 mg Tab tablet, Take 1 tablet (40 mg) by mouth daily with food, Disp: 90 tablet, Rfl: 3    semaglutide, weight loss, (WEGOVY) 1 mg/0.5 mL PnIj, Inject 1 mg into the skin every 7 days., Disp: 2 mL, Rfl: 2    traZODone (DESYREL) 50 MG tablet, Take 1 tablet (50 mg) by mouth daily at bedtime as needed, Disp: 90 tablet, Rfl: 3    valACYclovir (VALTREX) 1000 MG tablet, Take 1 tablet (1,000 mg total) by mouth once daily., Disp: 90 tablet, Rfl: 3    meloxicam (MOBIC) 15 MG tablet, Take 1  "tablet (15 mg total) by mouth daily as needed for Pain., Disp: 30 tablet, Rfl: 1    norethindrone (AYGESTIN) 5 mg Tab, Take 1 tablet (5 mg total) by mouth 3 (three) times daily. (Patient not taking: Reported on 9/5/2024), Disp: 30 tablet, Rfl: 1    ALLERGIES     Review of patient's allergies indicates:   Allergen Reactions    Percocet [oxycodone-acetaminophen] Nausea And Vomiting     Causes extreme N/V. OK to take Hydrocodone.          REVIEW OF SYSTEMS   Constitution: Negative. Negative for chills, fever and night sweats.   HENT: Negative for congestion and headaches.    Eyes: Negative for blurred vision, left vision loss and right vision loss.   Cardiovascular: Negative for chest pain and syncope.   Respiratory: Negative for cough and shortness of breath.    Endocrine: Negative for polydipsia, polyphagia and polyuria.   Hematologic/Lymphatic: Negative for bleeding problem. Does not bruise/bleed easily.   Skin: Negative for dry skin, itching and rash.   Musculoskeletal: Negative for falls. Positive for right knee pain and swelling.  Gastrointestinal: Negative for abdominal pain and bowel incontinence.   Genitourinary: Negative for bladder incontinence and nocturia.   Neurological: Negative for disturbances in coordination, loss of balance and seizures.   Psychiatric/Behavioral: Negative for depression. The patient does not have insomnia.    Allergic/Immunologic: Negative for hives and persistent infections.     PHYSICAL EXAMINATION    Vitals: BP (!) 142/80   Pulse 61   Ht 5' 4" (1.626 m)   Wt 93.2 kg (205 lb 7.5 oz)   BMI 35.27 kg/m²     General: The patient appears active and healthy with no apparent physical problems.  No disturbance of mood or affect is demonstrated. Alert and Oriented.    HEENT: Eyes normal, pupils equally round, nose normal.    Resp: Equal and symmetrical chest rises. No wheezing    CV: Regular rate    Neck: Supple; nonpainful range of motion.    Extremities: no cyanosis, clubbing, edema, " or diffuse swelling.  Palpable pulses, good capillary refill of the digits.  No coolness, discoloration, edema or obvious varicosities.    Skin: no lesions noted.    Lymphatic: no detected adenopathy in the upper or lower extremities.    Neurologic: normal mental status, normal reflexes, normal gait and balance.  Patient is alert and oriented to person, place and time.  No flaccidity or spasticity is noted.  No motor or sensory deficits are noted.  Light touch is intact    Orthopaedic: KNEE EXAM - RIGHT    Inspection:   Normal skin color and appearance with no scars, no ecchymosis + trace effusion.      ROM:   0° - 145°.      Palpation:   There is no tenderness along medial joint line, medial plica, medial collateral ligament, pes bursa, iliotibial band, lateral collateral ligament, popliteal fossa, patellar tendon, or quadriceps tendon.  There is tenderness to palpation along the lateral joint line.    Stability: - anterior drawer, - Lachman, - pivot shift and - posterior drawer.      No instability with varus or valgus stress at 0° or 30°. Negative dial  test at 30° and 90°.    Tests:   - Eugenios test.  - patellar compression - grind test, - crepitus.  There is no patellar apprehension. - J Sign. - Inocencia's. - patellar tilt. . Lateral patella translation  2 quadrants. Medial patella translation 2 quadrants    Motor:   Quadriceps strength is 5/5 and hamstrings strength is 5/5. Hamstrings show no tightness.      Neuro:   Distal neurovascular status is normal    Vascular: Negative Homans and no palpable popliteal cords. 2+ pedal pulse with brisk cap refill    Gait: Antalgic without assistive device      IMAGING    MRI Knee Without Contrast Right  Narrative: EXAMINATION:  MRI KNEE WITHOUT CONTRAST RIGHT    CLINICAL HISTORY:  Knee pain, chronic, negative xray (Age >= 5y);Evaluate the lateral meniscus;Pain in right knee    TECHNIQUE:  Multiplanar, multisequence images were performed about the right knee.  No contrast  was administered.    COMPARISON:  Radiographs 10/07/2024    FINDINGS:  Medial compartment: No meniscal tear.  No cartilage defect or subchondral bone marrow edema.    Lateral compartment: Abnormal contour of the anterior horn lateral meniscus (2:12 and 3:18).  There is extensive anterolateral joint line edema.  No cartilage defect or subchondral bone marrow edema.    Patellofemoral compartment: Mild cartilage heterogeneity over the median patellar ridge.  Trochlear cartilage is maintained.  No subchondral bone marrow edema.    Tendons: Quadriceps, patellar, popliteus, and biceps femoris tendons are intact.    Ligaments: Cruciate and collateral ligaments are intact.    Bone: No fracture, osteonecrosis, or focal lesion.    Joint: Small effusion/synovitis.  Small Baker's cyst with adjacent soft tissue edema.  Impression: Abnormal contour of the anterior horn lateral meniscus with extensive anterolateral joint line edema, concerning for meniscal tear.    Mild patellar chondromalacia.    Small joint effusion/synovitis.    Small Baker's cyst with adjacent soft tissue edema suggesting inflammation or partial rupture.    Electronically signed by: Epifanio Callahan  Date:    10/16/2024  Time:    12:31    I have personally reviewed the MRI images and report.  There is a questionable tear of the anterior horn lateral meniscus with surrounding edema and chondromalacia patella.    IMPRESSION       ICD-10-CM ICD-9-CM   1. Acute lateral meniscus tear of right knee, initial encounter (initial encounter)  S83.281A 836.1   2. Chondromalacia, patella, right  M22.41 717.7         MEDICATIONS PRESCRIBED      Meloxicam 15 mg    RECOMMENDATIONS     Conservative and surgical treatment options for anterior horn lateral meniscal tear were discussed in depth today; right knee arthroscopy with lateral meniscal repair versus debridement, chondroplasty of the patellofemoral compartment, and all other indicated procedures  The patient elected to  proceed with operative intervention after all risks, benefits, and alternatives were discussed with the patient.  The risks of surgery include bleeding, scar formation, injuries to nerves, arteries and veins, need for additional surgeries, blood clots, infections, inability to return to the same level of the performance and the risk of anesthesia.   We also discussed a diagnostic intra-articular corticosteroid injection and she may consider this in the future  She would like to continue conservative treatment for now  Activity modifications were given  Encouraged frequent icing and elevation as needed for pain and swelling  Meloxicam 15 mg as needed for pain and swelling  Follow-up with me again in 3-4 weeks for repeat evaluation    All of their questions were answered.  They will call the clinic with any questions or concerns in the interim.     Should the patient's symptoms worsen, persist, or fail to improve they should return for reevaluation and I would be happy to see them back anytime.                  Cruz Vasquez PA-C       Please be aware that this note has been generated with the assistance of MModal voice-to-text.  Please excuse any spelling or grammatical errors.     Thank you for choosing [facility] PA-C for your sports medicine care. It is our goal to provide you with exceptional care that will help keep you healthy, active, and get you back in the game.     If you felt that you received exemplary care today, please consider leaving feedback for Mr. Pedro PA-C on [employer] at [email or web address]       Please do not hesitate to reach out to us via email, phone, or MyChart with any questions, concerns, or feedback.

## 2024-10-31 ENCOUNTER — PATIENT MESSAGE (OUTPATIENT)
Dept: FAMILY MEDICINE | Facility: HOSPITAL | Age: 44
End: 2024-10-31
Payer: COMMERCIAL

## 2024-11-11 ENCOUNTER — TELEPHONE (OUTPATIENT)
Dept: SPORTS MEDICINE | Facility: CLINIC | Age: 44
End: 2024-11-11
Payer: COMMERCIAL

## 2024-11-11 NOTE — TELEPHONE ENCOUNTER
Called pt and got her scheduled with KM     ----- Message from Jonny sent at 11/11/2024 11:53 AM CST -----  Regarding: Surgery Consult  Contact: [phone]  Pt is calling in ref to meeting with provider's MD to have a surgery consult. Patient Requesting Call Back @  [phone]

## 2024-11-13 ENCOUNTER — OFFICE VISIT (OUTPATIENT)
Dept: SPORTS MEDICINE | Facility: CLINIC | Age: 44
End: 2024-11-13
Payer: COMMERCIAL

## 2024-11-13 VITALS
DIASTOLIC BLOOD PRESSURE: 99 MMHG | BODY MASS INDEX: 35.51 KG/M2 | HEART RATE: 56 BPM | HEIGHT: 64 IN | SYSTOLIC BLOOD PRESSURE: 143 MMHG | WEIGHT: 208 LBS

## 2024-11-13 DIAGNOSIS — S83.281A ACUTE LATERAL MENISCUS TEAR OF RIGHT KNEE, INITIAL ENCOUNTER: ICD-10-CM

## 2024-11-13 DIAGNOSIS — M22.41 CHONDROMALACIA, PATELLA, RIGHT: Primary | ICD-10-CM

## 2024-11-13 PROCEDURE — 1159F MED LIST DOCD IN RCRD: CPT | Mod: CPTII,S$GLB,, | Performed by: ORTHOPAEDIC SURGERY

## 2024-11-13 PROCEDURE — 3044F HG A1C LEVEL LT 7.0%: CPT | Mod: CPTII,S$GLB,, | Performed by: ORTHOPAEDIC SURGERY

## 2024-11-13 PROCEDURE — 3008F BODY MASS INDEX DOCD: CPT | Mod: CPTII,S$GLB,, | Performed by: ORTHOPAEDIC SURGERY

## 2024-11-13 PROCEDURE — 3080F DIAST BP >= 90 MM HG: CPT | Mod: CPTII,S$GLB,, | Performed by: ORTHOPAEDIC SURGERY

## 2024-11-13 PROCEDURE — 99214 OFFICE O/P EST MOD 30 MIN: CPT | Mod: S$GLB,,, | Performed by: ORTHOPAEDIC SURGERY

## 2024-11-13 PROCEDURE — 3077F SYST BP >= 140 MM HG: CPT | Mod: CPTII,S$GLB,, | Performed by: ORTHOPAEDIC SURGERY

## 2024-11-13 PROCEDURE — 99999 PR PBB SHADOW E&M-EST. PATIENT-LVL III: CPT | Mod: PBBFAC,,, | Performed by: ORTHOPAEDIC SURGERY

## 2024-11-13 NOTE — PROGRESS NOTES
ESTABLISHED PATIENT    History 11/13/2024  Veronique returns here today for evaluation of her right knee and to discuss possible surgical interventions. She has been treated for this injury by Cruz Vasquez PA-C. History below. She state that she has pain with squats and pain with standing after prolonged sitting. She was prescribed Meloxicam at her last visit, but have not taken it yet. Treatments she has tried include anti-inflammatories, activity modification, ice, and rest with out major relief.     History 10/21/2024:  Veronique returns here today for follow-up evaluation of her right knee and to discuss her MRI results.    History 10/7/2024:  44 y.o. Female with a history of right knee pain who is an employee of [employer] and works from home.  She began having lateral-sided knee pain after bending down to  something off the floor and standing up 1 month ago.  She felt a twinge in the knee initially but had significant pain throughout the evening.  She had difficulty ambulating for several days and noticed increased swelling.  She had to ambulate with a cane.  After beginning conservative treatment including rest from physical activities with frequent icing and oral NSAIDs, her symptoms improved but never went away.  This relief only lasted a few days and her symptoms quickly returned.  She continues to have lateral-sided pain particularly when the knee is flexed.  She enjoys running and working out with kettle bells and has been unable to do so because of her continued knee pain.        + mechanical symptoms, - instability    Is affecting ADLs.  Pain is 10/10 at it's worst.        PAST MEDICAL HISTORY    Past Medical History:   Diagnosis Date    Anemia     Breast disorder     Abscess post childbirth    Gestational diabetes     Gestational with last child    HSV infection     Major depressive disorder, recurrent, unspecified 10/12/2015    Subclinical hyperthyroidism 12/19/2016       PAST SURGICAL HISTORY      Past Surgical History:   Procedure Laterality Date    COLONOSCOPY N/A 2022    Procedure: COLONOSCOPY;  Surgeon: Albin Louise MD;  [facility]  Service: Endoscopy;  Laterality: N/A;  pt requested Sutab  10/26 instructions to portal-st    EXPLORATORY LAPAROTOMY      AFTER UTERINE PERF    HYSTEROSCOPY      IUD REMOVAL WITH PERF    INCISION AND DRAINAGE OF BREAST Left 2015    INTRAUTERINE DEVICE INSERTION      MIRENA / PARAGAURD     LAPAROSCOPIC SALPINGECTOMY Bilateral 2019    Procedure: SALPINGECTOMY, LAPAROSCOPIC;  Surgeon: Petr Foley Jr., MD;  Location: [facility];  Service: OB/GYN;  Laterality: Bilateral;    TONSILLECTOMY, ADENOIDECTOMY      TUBAL LIGATION         FAMILY HISTORY    Family History   Problem Relation Name Age of Onset    Diabetes Mother Mom         Type II    Hypertension Mother Mom     Arthritis Mother Mom     Colon cancer Father Dad 63        passed at 63yo [id number]    Hypertension Father Dad     Arthritis Father Dad     Hypertension Sister Sister     Other Sister          prediabetes    Dementia Maternal Grandmother      Heart attack Maternal Grandfather      Colon cancer Paternal Grandmother      Colon cancer Paternal Grandfather          dx in 80s,  year later    Breast cancer Paternal Aunt  60    Ovarian cancer Neg Hx      Esophageal cancer Neg Hx         SOCIAL HISTORY    Social History     Socioeconomic History    Marital status:      Spouse name: [employer]   Occupational History    Occupation: [employer]     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    Drug use: Never    Sexual activity: Yes     Partners: Male     Birth control/protection: See Surgical Hx, Other-see comments     Comment: Bilateral  salpingectomy   Social History Narrative    Pt: ADITI, grew up in [place].    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    Pt's last child, Luz Marina, , choked on grape 2014. Pt does not wish to review this / visit grief at [facility] visits.     Social Drivers of Health     Financial Resource Strain: Patient Declined (2024)    Overall Financial Resource Strain (CARDIA)     Difficulty of Paying Living Expenses: Patient declined   Food Insecurity: Patient Declined (2024)    Hunger Vital Sign     Worried About Running Out of Food in the Last Year: Patient declined     Ran Out of Food in the Last Year: Patient declined   Transportation Needs: Patient Declined (2024)    PRAPARE - Transportation     Lack of Transportation (Medical): Patient declined     Lack of Transportation (Non-Medical): Patient declined   Physical Activity: Unknown (2024)    Exercise Vital Sign     Days of Exercise per Week: Patient declined   Stress: Patient Declined (2024)    [employer] [place] [employer] - Occupational Stress Questionnaire     Feeling of Stress : Patient declined   Housing Stability: Patient Declined (2024)    Housing Stability Vital Sign     Unable to Pay for Housing in the Last Year: Patient declined     Unstable Housing in the Last Year: Patient declined       MEDICATIONS      Current Outpatient Medications:     busPIRone (BUSPAR) 10 MG tablet, Take 1 tablet (10 mg) by mouth 2 times per day, Disp: 180 tablet, Rfl: 3    busPIRone (BUSPAR) 10 MG tablet, Take 1 tablet (10 mg) by mouth 3 times per day, Disp: 90 tablet, Rfl: 3    lisdexamfetamine (VYVANSE) 40 MG Cap, Take 40 mg by mouth every morning., Disp: , Rfl:     lurasidone (LATUDA) 40 mg Tab tablet, Take 1 tablet (40 mg) by mouth daily with food, Disp: 90 tablet, Rfl: 3    meloxicam (MOBIC) 15 MG tablet, Take 1 tablet (15 mg total) by mouth daily as needed for Pain., Disp: 30 tablet, Rfl: 1    semaglutide,  weight loss, (WEGOVY) 1 mg/0.5 mL PnIj, Inject 1 mg into the skin every 7 days., Disp: 2 mL, Rfl: 2    traZODone (DESYREL) 50 MG tablet, Take 1 tablet (50 mg) by mouth daily at bedtime as needed, Disp: 90 tablet, Rfl: 3    valACYclovir (VALTREX) 1000 MG tablet, Take 1 tablet (1,000 mg total) by mouth once daily., Disp: 90 tablet, Rfl: 3    lurasidone (LATUDA) 40 mg Tab tablet, Take 1 tablet (40 mg) by mouth daily with food (Patient not taking: Reported on 11/13/2024), Disp: 90 tablet, Rfl: 3    lurasidone (LATUDA) 40 mg Tab tablet, Take 1 tablet (40 mg) by mouth daily with food (Patient not taking: Reported on 11/13/2024), Disp: 30 tablet, Rfl: 3    norethindrone (AYGESTIN) 5 mg Tab, Take 1 tablet (5 mg total) by mouth 3 (three) times daily. (Patient not taking: Reported on 11/13/2024), Disp: 30 tablet, Rfl: 1    ALLERGIES     Review of patient's allergies indicates:   Allergen Reactions    Percocet [oxycodone-acetaminophen] Nausea And Vomiting     Causes extreme N/V. OK to take Hydrocodone.          REVIEW OF SYSTEMS   Constitution: Negative. Negative for chills, fever and night sweats.   HENT: Negative for congestion and headaches.    Eyes: Negative for blurred vision, left vision loss and right vision loss.   Cardiovascular: Negative for chest pain and syncope.   Respiratory: Negative for cough and shortness of breath.    Endocrine: Negative for polydipsia, polyphagia and polyuria.   Hematologic/Lymphatic: Negative for bleeding problem. Does not bruise/bleed easily.   Skin: Negative for dry skin, itching and rash.   Musculoskeletal: Negative for falls. Positive for right knee pain and swelling.  Gastrointestinal: Negative for abdominal pain and bowel incontinence.   Genitourinary: Negative for bladder incontinence and nocturia.   Neurological: Negative for disturbances in coordination, loss of balance and seizures.   Psychiatric/Behavioral: Negative for depression. The patient does not have insomnia.   "  Allergic/Immunologic: Negative for hives and persistent infections.     PHYSICAL EXAMINATION    Vitals: BP (!) 143/99   Pulse (!) 56   Ht 5' 4" (1.626 m)   Wt 94.3 kg (208 lb 0.1 oz)   BMI 35.70 kg/m²     General: The patient appears active and healthy with no apparent physical problems.  No disturbance of mood or affect is demonstrated. Alert and Oriented.    HEENT: Eyes normal, pupils equally round, nose normal.    Resp: Equal and symmetrical chest rises. No wheezing    CV: Regular rate    Neck: Supple; nonpainful range of motion.    Extremities: no cyanosis, clubbing, edema, or diffuse swelling.  Palpable pulses, good capillary refill of the digits.  No coolness, discoloration, edema or obvious varicosities.    Skin: no lesions noted.    Lymphatic: no detected adenopathy in the upper or lower extremities.    Neurologic: normal mental status, normal reflexes, normal gait and balance.  Patient is alert and oriented to person, place and time.  No flaccidity or spasticity is noted.  No motor or sensory deficits are noted.  Light touch is intact    Orthopaedic: KNEE EXAM - RIGHT    Inspection:   Normal skin color and appearance with no scars, no ecchymosis + trace effusion.      ROM:   0° - 145°.      Palpation:   There is no tenderness along medial joint line, medial plica, medial collateral ligament, pes bursa, iliotibial band, lateral collateral ligament, popliteal fossa, patellar tendon, or quadriceps tendon.  There is tenderness to palpation along the medial joint line.    Stability: - anterior drawer, - Lachman, - pivot shift and - posterior drawer.      No instability with varus or valgus stress at 0° or 30°. Negative dial  test at 30° and 90°.    Tests:   - Eugenios test.  - patellar compression - grind test, mild patellofemoral crepitus.  There is no patellar apprehension. - J Sign. - Inocencia's. - patellar tilt. . Lateral patella translation  2 quadrants. Medial patella translation 2 quadrants    Motor: "   Quadriceps strength is 5/5 and hamstrings strength is 5/5. Hamstrings show no tightness.      Neuro:   Distal neurovascular status is normal    Vascular: Negative Homans and no palpable popliteal cords. 2+ pedal pulse with brisk cap refill    Gait: Antalgic without assistive device      IMAGING    MRI Knee Without Contrast Right  Narrative: EXAMINATION:  MRI KNEE WITHOUT CONTRAST RIGHT    CLINICAL HISTORY:  Knee pain, chronic, negative xray (Age >= 5y);Evaluate the lateral meniscus;Pain in right knee    TECHNIQUE:  Multiplanar, multisequence images were performed about the right knee.  No contrast was administered.    COMPARISON:  Radiographs 10/07/2024    FINDINGS:  Medial compartment: No meniscal tear.  No cartilage defect or subchondral bone marrow edema.    Lateral compartment: Abnormal contour of the anterior horn lateral meniscus (2:12 and 3:18).  There is extensive anterolateral joint line edema.  No cartilage defect or subchondral bone marrow edema.    Patellofemoral compartment: Mild cartilage heterogeneity over the median patellar ridge.  Trochlear cartilage is maintained.  No subchondral bone marrow edema.    Tendons: Quadriceps, patellar, popliteus, and biceps femoris tendons are intact.    Ligaments: Cruciate and collateral ligaments are intact.    Bone: No fracture, osteonecrosis, or focal lesion.    Joint: Small effusion/synovitis.  Small Baker's cyst with adjacent soft tissue edema.  Impression: Abnormal contour of the anterior horn lateral meniscus with extensive anterolateral joint line edema, concerning for meniscal tear.    Mild patellar chondromalacia.    Small joint effusion/synovitis.    Small Baker's cyst with adjacent soft tissue edema suggesting inflammation or partial rupture.    Electronically signed by: Epifanio Callahan  Date:    10/16/2024  Time:    12:31    I have personally reviewed the MRI images and report.  There is a questionable tear of the anterior horn lateral meniscus with  surrounding edema and chondromalacia patella.    IMPRESSION       ICD-10-CM ICD-9-CM   1. Acute lateral meniscus tear of right knee, initial encounter (initial encounter)  S83.281A 836.1   2. Chondromalacia, patella, right  M22.41 717.7         MEDICATIONS PRESCRIBED      Continue with Mobic     RECOMMENDATIONS     Conservative versus surgical treatment options discussed in depth today; right knee arthroscopy with lateral meniscal repair versus debridement, chondroplasty of the patellofemoral compartment, and all other indicated procedures  We also discussed a diagnostic intra-articular corticosteroid injection and she may consider this in the future  She would like to continue conservative treatment for now. I recommended activity modification in regards to running versus a non-impact cardio exercise   She will reach out to us when she would like to proceed with any other form of treatment.         Julia Carpio M.D.  [email or web address]

## 2024-11-19 ENCOUNTER — PATIENT MESSAGE (OUTPATIENT)
Dept: RESEARCH | Facility: HOSPITAL | Age: 44
End: 2024-11-19
Payer: COMMERCIAL

## 2024-12-31 NOTE — PROGRESS NOTES
CC: IUD check    Veronique Louise is a 37 y.o. female  presents for IUD check.  Patient had a ParaGard placed by myself on 17, under ultrasound guidance.  She is not having problems with bleeding, cramping, fever or discharge.  She is able to feel the strings.      PHYSICAL EXAM:  Abdomen:  Soft, non-tender, non-distended  Vulva:  No lesions  Vaginal:  No lesions, no abnormal discharge  Cervix: No discharge, no CMT, IUD strings visible at os.  Uterus:  Normal size, non-tender  Adnexa:  No masses, non-tender    ASSESSMENT AND PLAN:  1. IUD surveillance    Patient counseled on IUD danger signs and how to check the strings reviewed.    Return for annual GYN exam     I was just going to have her continue her Lisinopril 10mg qPM but if she's apprehensive & would like to hold her Lisinopril while on Propranolol, that's fine with me.

## 2025-01-02 DIAGNOSIS — E66.9 OBESITY, UNSPECIFIED CLASS, UNSPECIFIED OBESITY TYPE, UNSPECIFIED WHETHER SERIOUS COMORBIDITY PRESENT: ICD-10-CM

## 2025-01-02 RX ORDER — SEMAGLUTIDE 1 MG/.5ML
1 INJECTION, SOLUTION SUBCUTANEOUS
Qty: 2 ML | Refills: 0 | Status: ACTIVE | OUTPATIENT
Start: 2025-01-02

## 2025-01-13 ENCOUNTER — OFFICE VISIT (OUTPATIENT)
Dept: SPORTS MEDICINE | Facility: CLINIC | Age: 45
End: 2025-01-13
Payer: COMMERCIAL

## 2025-01-13 VITALS
BODY MASS INDEX: 33.89 KG/M2 | WEIGHT: 198.5 LBS | HEIGHT: 64 IN | HEART RATE: 71 BPM | DIASTOLIC BLOOD PRESSURE: 85 MMHG | SYSTOLIC BLOOD PRESSURE: 137 MMHG

## 2025-01-13 DIAGNOSIS — M22.41 CHONDROMALACIA, PATELLA, RIGHT: ICD-10-CM

## 2025-01-13 DIAGNOSIS — S83.281A ACUTE LATERAL MENISCUS TEAR OF RIGHT KNEE, INITIAL ENCOUNTER: Primary | ICD-10-CM

## 2025-01-13 PROCEDURE — 99999 PR PBB SHADOW E&M-EST. PATIENT-LVL IV: CPT | Mod: PBBFAC,,, | Performed by: PHYSICIAN ASSISTANT

## 2025-01-13 PROCEDURE — 3075F SYST BP GE 130 - 139MM HG: CPT | Mod: CPTII,S$GLB,, | Performed by: PHYSICIAN ASSISTANT

## 2025-01-13 PROCEDURE — 1159F MED LIST DOCD IN RCRD: CPT | Mod: CPTII,S$GLB,, | Performed by: PHYSICIAN ASSISTANT

## 2025-01-13 PROCEDURE — 1160F RVW MEDS BY RX/DR IN RCRD: CPT | Mod: CPTII,S$GLB,, | Performed by: PHYSICIAN ASSISTANT

## 2025-01-13 PROCEDURE — 3079F DIAST BP 80-89 MM HG: CPT | Mod: CPTII,S$GLB,, | Performed by: PHYSICIAN ASSISTANT

## 2025-01-13 PROCEDURE — 99213 OFFICE O/P EST LOW 20 MIN: CPT | Mod: S$GLB,,, | Performed by: PHYSICIAN ASSISTANT

## 2025-01-13 PROCEDURE — 3008F BODY MASS INDEX DOCD: CPT | Mod: CPTII,S$GLB,, | Performed by: PHYSICIAN ASSISTANT

## 2025-01-13 RX ORDER — DICLOFENAC SODIUM 10 MG/G
4 GEL TOPICAL 3 TIMES DAILY PRN
Qty: 150 G | Refills: 1 | Status: SHIPPED | OUTPATIENT
Start: 2025-01-13

## 2025-01-13 NOTE — PROGRESS NOTES
ESTABLISHED PATIENT    History 1/13/2025:  Veronique returns here today for follow-up evaluation of her right knee.  She states that the meloxicam has not given her much relief and she continues to have pain.  She denies having any mechanical symptoms such as catching, locking, or giving way.    History 11/13/2024  Veronique returns here today for evaluation of her right knee and to discuss possible surgical interventions. She has been treated for this injury by Cruz Vasquez PA-C. History below. She state that she has pain with squats and pain with standing after prolonged sitting. She was prescribed Meloxicam at her last visit, but have not taken it yet. Treatments she has tried include anti-inflammatories, activity modification, ice, and rest with out major relief.     History 10/21/2024:  Veronique returns here today for follow-up evaluation of her right knee and to discuss her MRI results.    History 10/7/2024:  44 y.o. Female with a history of right knee pain who is an employee of [employer] and works from home.  She began having lateral-sided knee pain after bending down to  something off the floor and standing up 1 month ago.  She felt a twinge in the knee initially but had significant pain throughout the evening.  She had difficulty ambulating for several days and noticed increased swelling.  She had to ambulate with a cane.  After beginning conservative treatment including rest from physical activities with frequent icing and oral NSAIDs, her symptoms improved but never went away.  This relief only lasted a few days and her symptoms quickly returned.  She continues to have lateral-sided pain particularly when the knee is flexed.  She enjoys running and working out with [employer] and has been unable to do so because of her continued knee pain.        + mechanical symptoms, - instability    Is affecting ADLs.  Pain is 10/10 at it's worst.        PAST MEDICAL HISTORY    Past Medical History:   Diagnosis  Date    Anemia     Breast disorder     Abscess post childbirth    Gestational diabetes     Gestational with last child    HSV infection     Major depressive disorder, recurrent, unspecified 10/12/2015    Subclinical hyperthyroidism 2016       PAST SURGICAL HISTORY     Past Surgical History:   Procedure Laterality Date    COLONOSCOPY N/A 2022    Procedure: COLONOSCOPY;  Surgeon: Albin Louise MD;  Location: [facility] (4TH FLR);  Service: Endoscopy;  Laterality: N/A;  pt requested Sutab  10/26 instructions to portal-st    EXPLORATORY LAPAROTOMY      AFTER UTERINE PERF    HYSTEROSCOPY      IUD REMOVAL WITH PERF    INCISION AND DRAINAGE OF BREAST Left     INTRAUTERINE DEVICE INSERTION      MIRENA / PARAGAURD     LAPAROSCOPIC SALPINGECTOMY Bilateral 2019    Procedure: SALPINGECTOMY, LAPAROSCOPIC;  Surgeon: Petr Foley Jr., MD;  Location: [facility];  Service: OB/GYN;  Laterality: Bilateral;    TONSILLECTOMY, ADENOIDECTOMY      TUBAL LIGATION         FAMILY HISTORY    Family History   Problem Relation Name Age of Onset    Diabetes Mother Mom         Type II    Hypertension Mother Mom     Arthritis Mother Mom     Colon cancer Father Dad 63        passed at 63yo [id number]    Hypertension Father Dad     Arthritis Father Dad     Hypertension Sister Sister     Other Sister          prediabetes    Dementia Maternal Grandmother      Heart attack Maternal Grandfather      Colon cancer Paternal Grandmother      Colon cancer Paternal Grandfather          dx in 80s,  year later    Breast cancer Paternal Aunt  60    Ovarian cancer Neg Hx      Esophageal cancer Neg Hx         SOCIAL HISTORY    Social History     Socioeconomic History    Marital status:      Spouse name: ForeSee   Occupational History    Occupation: [employer]     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    Drug use: Never    Sexual activity: Yes     Partners: Male     Birth control/protection: See Surgical Hx, Other-see comments     Comment: Bilateral salpingectomy   Social History Narrative    Pt: [place]ITI, grew up in [place].    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    Pt's last child, Luz Marina, , choked on grape 2014. Pt does not wish to review this / visit grief at [facility] visits.     Social Drivers of Health     Financial Resource Strain: Patient Declined (2024)    Overall Financial Resource Strain (CARDIA)     Difficulty of Paying Living Expenses: Patient declined   Food Insecurity: Patient Declined (2024)    Hunger Vital Sign     Worried About Running Out of Food in the Last Year: Patient declined     Ran Out of Food in the Last Year: Patient declined   Transportation Needs: Patient Declined (2024)    PRAPARE - Transportation     Lack of Transportation (Medical): Patient declined     Lack of Transportation (Non-Medical): Patient declined   Physical Activity: Unknown (2024)    Exercise Vital Sign     Days of Exercise per Week: Patient declined   Stress: Patient Declined (2024)    [employer] - Occupational Stress Questionnaire     Feeling of Stress : Patient declined   Housing Stability: Patient Declined (2024)    Housing Stability Vital Sign     Unable to Pay for Housing in the Last Year: Patient declined     Unstable Housing in the Last Year: Patient declined       MEDICATIONS      Current Outpatient Medications:     busPIRone (BUSPAR) 10 MG tablet, Take 1 tablet (10 mg) by mouth 2 times per day, Disp: 180 tablet, Rfl: 3    busPIRone (BUSPAR) 10 MG tablet, Take 1 tablet (10 mg) by mouth 3 times per day, Disp: 90 tablet, Rfl: 3    lisdexamfetamine (VYVANSE) 40 MG Cap, Take 40 mg by mouth every  morning., Disp: , Rfl:     lurasidone (LATUDA) 40 mg Tab tablet, Take 1 tablet (40 mg) by mouth daily with food, Disp: 90 tablet, Rfl: 3    meloxicam (MOBIC) 15 MG tablet, Take 1 tablet (15 mg total) by mouth daily as needed for Pain., Disp: 30 tablet, Rfl: 1    semaglutide, weight loss, (WEGOVY) 1 mg/0.5 mL PnIj, Inject 1 mg into the skin every 7 days., Disp: 2 mL, Rfl: 0    traZODone (DESYREL) 50 MG tablet, Take 1 tablet (50 mg) by mouth daily at bedtime as needed, Disp: 90 tablet, Rfl: 3    lurasidone (LATUDA) 40 mg Tab tablet, Take 1 tablet (40 mg) by mouth daily with food (Patient not taking: Reported on 1/13/2025), Disp: 90 tablet, Rfl: 3    lurasidone (LATUDA) 40 mg Tab tablet, Take 1 tablet (40 mg) by mouth daily with food (Patient not taking: Reported on 1/13/2025), Disp: 30 tablet, Rfl: 3    norethindrone (AYGESTIN) 5 mg Tab, Take 1 tablet (5 mg total) by mouth 3 (three) times daily. (Patient not taking: Reported on 9/5/2024), Disp: 30 tablet, Rfl: 1    valACYclovir (VALTREX) 1000 MG tablet, Take 1 tablet (1,000 mg total) by mouth once daily., Disp: 90 tablet, Rfl: 3    ALLERGIES     Review of patient's allergies indicates:   Allergen Reactions    Percocet [oxycodone-acetaminophen] Nausea And Vomiting     Causes extreme N/V. OK to take Hydrocodone.          REVIEW OF SYSTEMS   Constitution: Negative. Negative for chills, fever and night sweats.   HENT: Negative for congestion and headaches.    Eyes: Negative for blurred vision, left vision loss and right vision loss.   Cardiovascular: Negative for chest pain and syncope.   Respiratory: Negative for cough and shortness of breath.    Endocrine: Negative for polydipsia, polyphagia and polyuria.   Hematologic/Lymphatic: Negative for bleeding problem. Does not bruise/bleed easily.   Skin: Negative for dry skin, itching and rash.   Musculoskeletal: Negative for falls. Positive for right knee pain and swelling.  Gastrointestinal: Negative for abdominal pain and  "bowel incontinence.   Genitourinary: Negative for bladder incontinence and nocturia.   Neurological: Negative for disturbances in coordination, loss of balance and seizures.   Psychiatric/Behavioral: Negative for depression. The patient does not have insomnia.    Allergic/Immunologic: Negative for hives and persistent infections.     PHYSICAL EXAMINATION    Vitals: /85   Pulse 71   Ht 5' 4" (1.626 m)   Wt 90.1 kg (198 lb 8.4 oz)   BMI 34.08 kg/m²     General: The patient appears active and healthy with no apparent physical problems.  No disturbance of mood or affect is demonstrated. Alert and Oriented.    HEENT: Eyes normal, pupils equally round, nose normal.    Resp: Equal and symmetrical chest rises. No wheezing    CV: Regular rate    Neck: Supple; nonpainful range of motion.    Extremities: no cyanosis, clubbing, edema, or diffuse swelling.  Palpable pulses, good capillary refill of the digits.  No coolness, discoloration, edema or obvious varicosities.    Skin: no lesions noted.    Lymphatic: no detected adenopathy in the upper or lower extremities.    Neurologic: normal mental status, normal reflexes, normal gait and balance.  Patient is alert and oriented to person, place and time.  No flaccidity or spasticity is noted.  No motor or sensory deficits are noted.  Light touch is intact    Orthopaedic: KNEE EXAM - RIGHT    Inspection:   Normal skin color and appearance with no scars, no ecchymosis + trace effusion.      ROM:   0° - 145°.      Palpation:   There is no tenderness along medial joint line, medial plica, medial collateral ligament, pes bursa, iliotibial band, lateral collateral ligament, popliteal fossa, patellar tendon, or quadriceps tendon.  There is tenderness to palpation along the medial joint line.    Stability: - anterior drawer, - Lachman, - pivot shift and - posterior drawer.      No instability with varus or valgus stress at 0° or 30°. Negative dial  test at 30° and 90°.    Tests:   " - Eugenios test.  - patellar compression - grind test, mild patellofemoral crepitus.  There is no patellar apprehension. - J Sign. - Inocencia's. - patellar tilt. . Lateral patella translation  2 quadrants. Medial patella translation 2 quadrants    Motor:   Quadriceps strength is 5/5 and hamstrings strength is 5/5. Hamstrings show no tightness.      Neuro:   Distal neurovascular status is normal    Vascular: Negative Homans and no palpable popliteal cords. 2+ pedal pulse with brisk cap refill    Gait: Antalgic without assistive device      IMAGING    MRI Knee Without Contrast Right  Narrative: EXAMINATION:  MRI KNEE WITHOUT CONTRAST RIGHT    CLINICAL HISTORY:  Knee pain, chronic, negative xray (Age >= 5y);Evaluate the lateral meniscus;Pain in right knee    TECHNIQUE:  Multiplanar, multisequence images were performed about the right knee.  No contrast was administered.    COMPARISON:  Radiographs 10/07/2024    FINDINGS:  Medial compartment: No meniscal tear.  No cartilage defect or subchondral bone marrow edema.    Lateral compartment: Abnormal contour of the anterior horn lateral meniscus (2:12 and 3:18).  There is extensive anterolateral joint line edema.  No cartilage defect or subchondral bone marrow edema.    Patellofemoral compartment: Mild cartilage heterogeneity over the median patellar ridge.  Trochlear cartilage is maintained.  No subchondral bone marrow edema.    Tendons: Quadriceps, patellar, popliteus, and biceps femoris tendons are intact.    Ligaments: Cruciate and collateral ligaments are intact.    Bone: No fracture, osteonecrosis, or focal lesion.    Joint: Small effusion/synovitis.  Small Baker's cyst with adjacent soft tissue edema.  Impression: Abnormal contour of the anterior horn lateral meniscus with extensive anterolateral joint line edema, concerning for meniscal tear.    Mild patellar chondromalacia.    Small joint effusion/synovitis.    Small Baker's cyst with adjacent soft tissue edema  suggesting inflammation or partial rupture.    Electronically signed by: Epifanio Callahan  Date:    10/16/2024  Time:    12:31    I have personally reviewed the MRI images and report.  There is a questionable tear of the anterior horn lateral meniscus with surrounding edema and chondromalacia patella.    IMPRESSION       ICD-10-CM ICD-9-CM   1. Acute lateral meniscus tear of right knee, initial encounter (initial encounter)  S83.281A 836.1   2. Chondromalacia, patella, right  M22.41 717.7           MEDICATIONS PRESCRIBED      Diclofenac 1% topical       RECOMMENDATIONS     Conservative versus surgical treatment options discussed in depth today; right knee arthroscopy with lateral meniscal repair versus debridement, chondroplasty of the patellofemoral compartment, and all other indicated procedures  We also discussed a diagnostic intra-articular corticosteroid injection and she may consider this in the future  She would like to continue conservative treatment for now and would like to undergo a course of formal physical therapy before considering any aggressive treatment.   Physical therapy has been ordered for knee strengthening and stabilization  I recommend activity modification in regards to running versus a non-impact cardio exercise  We also discussed the use of a topical NSAID rather than oral and a prescription for topical diclofenac has been sent to her pharmacy today   She will reach out to us when she would like to proceed with any other form of treatment.             Cruz Vasquez PA-C, MMS

## 2025-01-24 ENCOUNTER — CLINICAL SUPPORT (OUTPATIENT)
Dept: REHABILITATION | Facility: OTHER | Age: 45
End: 2025-01-24
Payer: COMMERCIAL

## 2025-01-24 DIAGNOSIS — M25.561 ACUTE PAIN OF RIGHT KNEE: Primary | ICD-10-CM

## 2025-01-24 DIAGNOSIS — M22.41 CHONDROMALACIA, PATELLA, RIGHT: ICD-10-CM

## 2025-01-24 DIAGNOSIS — S83.281A ACUTE LATERAL MENISCUS TEAR OF RIGHT KNEE, INITIAL ENCOUNTER: ICD-10-CM

## 2025-01-24 PROBLEM — M25.569 ACUTE KNEE PAIN: Status: ACTIVE | Noted: 2025-01-24

## 2025-01-24 PROCEDURE — 97112 NEUROMUSCULAR REEDUCATION: CPT | Mod: PN | Performed by: INTERNAL MEDICINE

## 2025-01-24 PROCEDURE — 97530 THERAPEUTIC ACTIVITIES: CPT | Mod: PN | Performed by: INTERNAL MEDICINE

## 2025-01-24 PROCEDURE — 97161 PT EVAL LOW COMPLEX 20 MIN: CPT | Mod: PN | Performed by: INTERNAL MEDICINE

## 2025-01-24 NOTE — PROGRESS NOTES
Outpatient Rehab    Physical Therapy Evaluation    Patient Name: Veronique Louise  MRN: [id number]  YOB: 1980  Today's Date: 1/29/2025    Therapy Diagnosis:   Encounter Diagnoses   Name Primary?    Acute lateral meniscus tear of right knee, initial encounter (initial encounter)     Chondromalacia, patella, right     Acute pain of right knee Yes     Physician: Cruz Vasquez, KONSTANTIN    Physician Orders: Eval and Treat  Medical Diagnosis:   S83.281A (ICD-10-CM) - Acute lateral meniscus tear of right knee, initial encounter   M22.41 (ICD-10-CM) - Chondromalacia, patella, right       Visit # / Visits Authorized:  1 / 1   Date of Evaluation:  1/24/2025   Insurance Authorization Period:  01/13/2026  Plan of Care Certification:  1/24/2025 to 4/21/25      Time In:9a   Time Out: 10a   Total time: 60         Subjective   History of Present Illness  Veronique is a 44 y.o. female who reports to physical therapy with a chief concern of R lateral knee joint line pain. According to the patient's chart, Veronique has a past medical history of Anemia, Breast disorder, Gestational diabetes, HSV infection, Major depressive disorder, recurrent, unspecified, and Subclinical hyperthyroidism. Veronique has a past surgical history that includes Intrauterine device insertion (2011); TONSILLECTOMY, ADENOIDECTOMY; Incision and drainage of breast (Left, 2015); Hysteroscopy; Exploratory laparotomy; Laparoscopic salpingectomy (Bilateral, 12/26/2019); Tubal ligation; and Colonoscopy (N/A, 11/30/2022).    The patient reports a medical diagnosis of Meniscus tear.            History of Present Condition/Illness: Bent down to  umbrella and had knee pain. Progressed to later that night. MD said running is damaging her knee and she should avoid it. Most pain with squatting, lunges, floor t/f/ Feels funny with squatting. Running 2-3 x per week, 2-4 miles with no c/o. Weight lifting occas- kettlebells and machines.  Also reports L  tibial  fx and diff with squatting since L 2014.    Activities of Daily Living  Social history was obtained from Family.    General Prior Level of Function Comments: few steps to enter home - MI but feels weaker              Pain     Patient reports a current pain level of 6/10.  Pain at worst is reported as 9/10.   Location: R lateral knee         Living Arrangements  Living Situation  Living Arrangements: Family members        Employment  Employment Status: Employed full-time   [employer], sitting most of the day.       Past Medical History/Physical Systems Review:   Veronique Louise  has a past medical history of Anemia, Breast disorder, Gestational diabetes, HSV infection, Major depressive disorder, recurrent, unspecified, and Subclinical hyperthyroidism.    Veronique Louise  has a past surgical history that includes Intrauterine device insertion (2011); TONSILLECTOMY, ADENOIDECTOMY; Incision and drainage of breast (Left, 2015); Hysteroscopy; Exploratory laparotomy; Laparoscopic salpingectomy (Bilateral, 12/26/2019); Tubal ligation; and Colonoscopy (N/A, 11/30/2022).    Veronique has a current medication list which includes the following prescription(s): buspirone, buspirone, diclofenac sodium, lisdexamfetamine, latuda, lurasidone, lurasidone, norethindrone, wegovy, trazodone, and valacyclovir.    Review of patient's allergies indicates:   Allergen Reactions    Percocet [oxycodone-acetaminophen] Nausea And Vomiting     Causes extreme N/V. OK to take Hydrocodone.         Objective   Knee Observations     Good B Patellar jm, lateral ttp R knee joint line       Knee Range of Motion   Right Knee   Active (deg) Passive (deg) Pain   Flexion 140   Yes   Extension -2           Left Knee   Active (deg) Passive (deg) Pain   Flexion 145       Extension 0           ROM LB flat back  100 %  Ext  50 % mo pain  SB B 75%     Ankle/Foot Range of Motion   Right Ankle/Foot   Active (deg) Passive (deg) Pain    Dorsiflexion (KE) 4       Dorsiflexion (KF)         Plantar Flexion   66     Ankle Inversion 34       Ankle Eversion 14       Subtalar Inversion         Subtalar Eversion         Great Toe MTP Flexion         Great Toe MTP Extension         Great Toe IP Flexion             Left Ankle/Foot   Active (deg) Passive (deg) Pain   Dorsiflexion (KE) 4       Dorsiflexion (KF)         Plantar Flexion 70       Ankle Inversion 30       Ankle Eversion 12       Subtalar Inversion         Subtalar Eversion         Great Toe MTP Flexion         Great Toe MTP Extension         Great Toe IP Flexion                            Hip Strength - Planes of Motion   Right Strength Right Pain Left Strength Left  Pain   Flexion (L2) 5   5     Extension 5         ABduction 4+   5     ADduction           Internal Rotation           External Rotation               Knee Strength   Right Strength Right Pain Left Strength Left  Pain   Flexion (S2) 5   5     Prone Flexion           Extension (L3) 4   5            Ankle/Foot Strength - Planes of Motion   Right Strength Right Pain Left Strength Left  Pain   Dorsiflexion (L4)           Plantar Flexion (S1)           Inversion           Eversion           Great Toe Flexion           Great Toe Extension (L5)           Lesser Toes Flexion           Lesser Toes Extension           Microfet    Inversion R  , L 10.3, 11.1, 12                  R 14,3, 14,5 , 12.3    R avg 11, L  13.7. L is stronger than R      Hip Special Tests  90/90 Hamstring Test  Negative: Right and Left          Knee Special Tests  Knee Ligament Tests  Negative: Right Anterior Drawer and Left Anterior Drawer  Negative: Right Valgus Stress at 30 Degrees       bounce home neg                  Ambulation Assistance Required  Surface With  Assistive Device Without Assistive Device Details   Level   Independent      Uneven         Curb            SLS B 20 sec decreased stability R   Fablance neg B     Intake Outcome Measure for FOTO  Survey    Therapist reviewed FOTO scores for Veronique Louise on 1/24/2025.   FOTO report - see Media section or FOTO account episode details.     Intake Score: 42, goal 66  %    Treatment:  Therapeutic Exercise  Therapeutic Exercise Activity 1: heel prop 3 min  Therapeutic Exercise Activity 2: calf stretch standing 20 x 3         Balance/Neuromuscular Re-Education  Balance/Neuromuscular Re-Education Activity 1: Quad Sets 10 sec x 5  Balance/Neuromuscular Re-Education Activity 2: SLR 2 x 10  Balance/Neuromuscular Re-Education Activity 3: Hip abd side 20x    Therapeutic Activity  Therapeutic Activity 1: recumbent bike seat 8  x 10 min              Patient's spiritual, cultural, and educational needs considered and patient agreeable to plan of care and goals.     Assessment & Plan   Assessment  Veronique presents with a condition of Low complexity.   Presentation of Symptoms: Stable  Will Comorbidities Impact Care: No       Functional Limitations: Activity tolerance, Participating in sports, Participating in leisure activities, Pain with ADLs/IADLs, Squatting, Transfers  Impairments: Abnormal muscle tone, Abnormal or restricted range of motion, Activity intolerance, Impaired physical strength, Lack of appropriate home exercise program, Pain with functional activity    Patient Goal for Therapy (PT): r/t running if able, squat and floor transfers without pain  Prognosis: Good  Assessment Details: Good melisa for ex today with no pain    Plan  From a physical therapy perspective, the patient would benefit from: Skilled Rehab Services    Planned therapy interventions include: Therapeutic exercise, Therapeutic activities, Neuromuscular re-education, Manual therapy, and ADLs/IADLs.            Visit Frequency: 1 times Per Week for 12 Weeks.       This plan was discussed with Patient.                    Goals:  Short Term Goals: 2 weeks          1. Decreased R knee pain to 8/10  Progressing, not met         2. Improved knee  arom to R knee ext to 0 Progressing, not met        3. Improved FOTO score Progressing, not met        Long Term Goals: 12 weeks     Independent with HEP. Progressing, not met  Report decreased    R knee    pain  <   / =  2  /10 with adls such as car t/f, squatting , floor transfers Progressing, not met  Increased MMT  for  R quads and hip abd to 5/5  Progressing, not met  Increased arom  for  R knee 0 to 145 Progressing, not met  Improved FOTO score to 66    Progressing, not met       Loreto Domingo, PT

## 2025-01-27 DIAGNOSIS — E66.9 OBESITY, UNSPECIFIED CLASS, UNSPECIFIED OBESITY TYPE, UNSPECIFIED WHETHER SERIOUS COMORBIDITY PRESENT: ICD-10-CM

## 2025-01-27 RX ORDER — SEMAGLUTIDE 1 MG/.5ML
1 INJECTION, SOLUTION SUBCUTANEOUS
Qty: 2 ML | Refills: 0 | OUTPATIENT
Start: 2025-01-27

## 2025-01-29 ENCOUNTER — CLINICAL SUPPORT (OUTPATIENT)
Dept: REHABILITATION | Facility: OTHER | Age: 45
End: 2025-01-29
Payer: COMMERCIAL

## 2025-01-29 DIAGNOSIS — M25.561 ACUTE PAIN OF RIGHT KNEE: Primary | ICD-10-CM

## 2025-01-29 PROCEDURE — 97112 NEUROMUSCULAR REEDUCATION: CPT | Mod: PN | Performed by: INTERNAL MEDICINE

## 2025-01-29 PROCEDURE — 97530 THERAPEUTIC ACTIVITIES: CPT | Mod: PN | Performed by: INTERNAL MEDICINE

## 2025-01-29 NOTE — PROGRESS NOTES
Outpatient Rehab    Physical Therapy Visit    Patient Name: Veronique Louise  MRN: [id number]  YOB: 1980  Today's Date: 1/29/2025    Therapy Diagnosis:   Encounter Diagnosis   Name Primary?    Acute pain of right knee Yes     Physician: Cruz Vasquez PA-C     ysician Orders: Eval and Treat  Medical Diagnosis:   S83.281A (ICD-10-CM) - Acute lateral meniscus tear of right knee, initial encounter   M22.41 (ICD-10-CM) - Chondromalacia, patella, right       Visit # / Visits Authorized:  1 / 10   Date of Evaluation:  1/24/2025   Insurance Authorization Period:  01/13/2026  Plan of Care Certification:  1/24/2025 to 4/21/25      Time In:   0825   Time Out:  0920  Total Time: 5560  Total Billable Time: 60         Subjective      Pain reported as 0.      Past Medical History/Physical Systems Review:   Veronique Louise  has a past medical history of Anemia, Breast disorder, Gestational diabetes, HSV infection, Major depressive disorder, recurrent, unspecified, and Subclinical hyperthyroidism.    Veronique Louise  has a past surgical history that includes Intrauterine device insertion (2011); TONSILLECTOMY, ADENOIDECTOMY; Incision and drainage of breast (Left, 2015); Hysteroscopy; Exploratory laparotomy; Laparoscopic salpingectomy (Bilateral, 12/26/2019); Tubal ligation; and Colonoscopy (N/A, 11/30/2022).    Veronique has a current medication list which includes the following prescription(s): buspirone, buspirone, diclofenac sodium, lisdexamfetamine, latuda, lurasidone, lurasidone, norethindrone, wegovy, trazodone, and valacyclovir.    Review of patient's allergies indicates:   Allergen Reactions    Percocet [oxycodone-acetaminophen] Nausea And Vomiting     Causes extreme N/V. OK to take Hydrocodone.         Objective       Knee Range of Motion   Right Knee   Active (deg) Passive (deg) Pain   Flexion         Extension 0                       Treatment:  Therapeutic Exercise  Therapeutic  Exercise Activity 1: heel prop 3 min  Therapeutic Exercise Activity 2: calf stretch standing 90 sec  slant board         Balance/Neuromuscular Re-Education  Balance/Neuromuscular Re-Education Activity 4: Blood flow restricton therapy SLR 30/15/15/15 60 % LOP  Balance/Neuromuscular Re-Education Activity 5: BFR LAQ 3 # 30/15/15/15/15   70 % LOP  Balance/Neuromuscular Re-Education Activity 6: U leg press BFR 37# 30/15/15/15 70% LOP  Balance/Neuromuscular Re-Education Activity 7: sls foam pad 20 sec x 3 each foot , UE A occasionally    Neuromuscular re-education x 30 min for improving strength and motor control:   Performed blood flow restriction with 148 mmHg (60% of LOP) on R LE  extremity with Susan Unit, skin protectant sleeve and green cuff. Patient screened for any precautions or contraindications prior to its use and continuously monitored for any adverse events. Patient given 30 sec rest breaks between sets and 1 min rest break between   SLR 30/10/10/10    Therapeutic Activity  Therapeutic Activity 1: recumbent bike seat 6  x 10 min         Patient's spiritual, cultural, and educational needs considered and patient agreeable to plan of care and goals.     Assessment & Plan   Assessment: No c/o pain with ex. Needed occas UE A with balance ex.  Evaluation/Treatment Tolerance: Patient tolerated treatment well  Education             Ok to perform drinking birds standing keeping knee slightly bent       Goals:  Short Term Goals: 2 weeks          1. Decreased R knee pain to 8/10  Progressing, not met         2. Improved knee arom to R knee ext to 0 Progressing, not met        3. Improved FOTO score Progressing, not met        Long Term Goals: 12 weeks     Independent with HEP. Progressing, not met  Report decreased    R knee    pain  <   / =  2  /10 with adls such as car t/f, squatting , floor transfers Progressing, not met  Increased MMT  for  R quads and hip abd to 5/5  Progressing, not met  Increased arom  for  R  knee 0 to 145 Progressing, not met  Improved FOTO score to 66    Progressing, not met    Plan:         From a physical therapy perspective, the patient would benefit from: Skilled Rehab Services    Planned therapy interventions include: Therapeutic exercise, Therapeutic activities, Neuromuscular re-education, Manual therapy, and ADLs/IADLs.            Visit Frequency: 1 times Per Week for 12 Weeks.       This plan was discussed with Patient.

## 2025-02-07 ENCOUNTER — OFFICE VISIT (OUTPATIENT)
Dept: PSYCHIATRY | Facility: CLINIC | Age: 45
End: 2025-02-07
Payer: COMMERCIAL

## 2025-02-07 DIAGNOSIS — F31.81 BIPOLAR II DISORDER: ICD-10-CM

## 2025-02-07 DIAGNOSIS — F33.42 RECURRENT MAJOR DEPRESSIVE DISORDER, IN FULL REMISSION: ICD-10-CM

## 2025-02-07 PROCEDURE — 90791 PSYCH DIAGNOSTIC EVALUATION: CPT | Mod: 95,,, | Performed by: PSYCHOLOGIST

## 2025-02-07 NOTE — PROGRESS NOTES
"The patient location is: She participated in a video session from her home in [facility]  The chief complaint leading to consultation is: Mood disorder, Bipolar II and Anxiety    Visit type: audiovisual    Face to Face time with patient: 51  60 minutes of total time spent on the encounter, which includes face to face time and non-face to face time preparing to see the patient (eg, review of tests), Obtaining and/or reviewing separately obtained history, Documenting clinical information in the electronic or other health record, Independently interpreting results (not separately reported) and communicating results to the patient/family/caregiver, or Care coordination (not separately reported).         Each patient to whom he or she provides medical services by telemedicine is:  (1) informed of the relationship between the physician and patient and the respective role of any other health care provider with respect to management of the patient; and (2) notified that he or she may decline to receive medical services by telemedicine and may withdraw from such care at any time.    Notes:     Outpatient Psychiatry Initial Visit  02/07/2025    History of Presenting Illness:  Pt is a 44 y.o. female who presents for psychotherapy to help her improve her mood and relationships. Patient was seen, examined and chart was reviewed. Patient reviewed and agreed to informed consent and limits of confidentiality.    Current Stressors: She states relationships with her  and her best gf are a challenge for her. She is in an open marriage. She is in love with her best gf. She is also stressed because she feels like she does not focus on herself. She focuses on her  and children. She wants to spend time getting to know herself and "what I want." Her 's drinking is a stressor. She has complicated grief. She lost a child in 2015.     Psychiatric Symptoms Review    Depression Symptoms: Depressed or down mood for a few " days followed by a hypomanic period, anhedonia, insomnia, psychomotor agitation, distressing thoughts.      Anxiety Symptoms: Worry, feeling tense, muscle tension, difficulty making decisions when stressed, difficulty letting go of worries, rapid heartbeat.     Mone Symptoms: More talkative, highly emotional, elevated mood, increased sexual drive, irritability, racing thoughts. Increased productivity, grandiosity.    Psychosis Symptoms: Pt denied current or history of related symptoms.    Attention/Concentration Symptoms: Pt stated she has been dx with ADHD. Difficulty focusing and concentrating.     Disordered Eating/Body Image Concerns: Pt denied current or history of related symptoms.    Suicidal Ideation and Risk:  Pt denied current or history of related symptoms.    Access to gun: unknown    Homicidal/Violent Ideation and Risk: Pt denied current or history of related symptoms.    Prior Mental Health Treatment:    Prior psychotherapy: She has been in therapy with a male therapist for years but there was a conflict when her best friend starting seeing her therapists wife for therapy.     Prior Psychiatric Hospitalizations:  Pt denied.     Current psychiatric medication: Buspar, Vyvanse, Latuda, Trazadone    Family Psychiatric History: unknown    Current Medical Conditions Per Chart Review:   Patient Active Problem List   Diagnosis    Family history of colon cancer    Subclinical hyperthyroidism    Hypocalcemia    Status post bilateral salpingectomy    Major depressive disorder, recurrent, unspecified    BMI 37.0-37.9, adult    Bipolar II disorder    Obesity, unspecified    Acute knee pain        SOCIAL HISTORY    Relationships and Support Network:  She is  with two children. She had one daughter who  as a toddler. She is close to family and has supportive friends.       Employment and Education:  Employed by [employer].  Works in IT.   Has a BA in Theatre      Abuse History:  Physical  Pt  denied.  Emotional  Yes  Sexual   Pt denied.    Other trauma: Pt denied.    -----------------------------------------------------    Clinical Assessment:     Summary  Patient has been diagnosed with bipolar II disorder, ADHD, and PTSD.  She has a history of wanting to leave her  but then becoming overwhelmed with depression and fear. She feels emotional about her two primary relationships.     Diagnosis(es):   1) Bipolar II disorder  2) ADHD  3) PTSD (by patient report)    Recurrent major depressive disorder, in full remission  -     Ambulatory referral/consult to Psychiatry    Bipolar II disorder  -     Ambulatory referral/consult to Psychiatry         Ability to adhere to plan:  Cooperative    Rationale for employing these interactive techniques: Applicable to diagnosis    Plan      Goal #1: Learn how to make myself the priority   Goal #2: Explore the relationships she has with her  and bf  Goal #3: Help make decisions as a parent  Goal #4: Explore her identity as a nontraditional woman and consider living outside of society norms.     This author reviewed limits to confidentiality. Pt indicated understanding and denied any questions.    Return to Clinic: 1 week      -Conducted diagnostic interview  -Pt instructed to call clinic, 911 or go to nearest emergency room if sxs worsen or pt is in crisis. The pt expresses understanding.    Each patient to whom he or she provides medical services by telemedicine is:  (1) informed of the relationship between the physician and patient and the respective role of any other health care provider with respect to management of the patient; and (2) notified that he or she may decline to receive medical services by telemedicine and may withdraw from such care at any time.         Mental Status Exam      Appearance: unremarkable  Grooming: appropriate to situation  Arousal: alert, awake  Behavior/Cooperation:Cooperative  Speech: normal tone, normal rate, normal pitch,  "normal volume  Language: appropriate english vocabulary  Mood: "good"  Affect: Euthymic, congruent to mood  Thought Process: Normal and logical  Thought Content: : normal, no suicidality, no homicidality, delusions, or paranoia  Associations: no loose associations noted  Orientation: Grossly intact  Attention Span/Concentration: intact  Insight: fair  Judgment: good    General: age appropriate, well nourished, casually dressed, neatly groomed    No follow-ups on file.     Charly Zambrano PsyD     "

## 2025-02-12 ENCOUNTER — DOCUMENTATION ONLY (OUTPATIENT)
Dept: REHABILITATION | Facility: OTHER | Age: 45
End: 2025-02-12
Payer: COMMERCIAL

## 2025-02-12 ENCOUNTER — CLINICAL SUPPORT (OUTPATIENT)
Dept: REHABILITATION | Facility: OTHER | Age: 45
End: 2025-02-12
Payer: COMMERCIAL

## 2025-02-12 DIAGNOSIS — S83.281A ACUTE LATERAL MENISCUS TEAR OF RIGHT KNEE, INITIAL ENCOUNTER: ICD-10-CM

## 2025-02-12 DIAGNOSIS — M25.561 ACUTE PAIN OF RIGHT KNEE: Primary | ICD-10-CM

## 2025-02-12 PROCEDURE — 97112 NEUROMUSCULAR REEDUCATION: CPT | Mod: PN,CQ

## 2025-02-12 PROCEDURE — 97140 MANUAL THERAPY 1/> REGIONS: CPT | Mod: PN,CQ

## 2025-02-12 PROCEDURE — 97530 THERAPEUTIC ACTIVITIES: CPT | Mod: PN,CQ

## 2025-02-12 NOTE — PROGRESS NOTES
Physical Therapist and Physical Therapist Assistant met face to face to discuss patient's treatment plan and progress towards established goals. Pt will be seen by a physical therapist minimally every 6th visit or every 30 days.    Erick Headley, PTA  02/12/2025

## 2025-02-12 NOTE — PROGRESS NOTES
Outpatient Rehab    Physical Therapy Visit    Patient Name: Veronique Louise  MRN: [id number]  YOB: 1980  Today's Date: 2/12/2025    Therapy Diagnosis:   Encounter Diagnoses   Name Primary?    Acute pain of right knee Yes    Acute lateral meniscus tear of right knee, initial encounter (initial encounter)        Physician: Cruz Vasquez PA-C     ysician Orders: Eval and Treat  Medical Diagnosis:   S83.281A (ICD-10-CM) - Acute lateral meniscus tear of right knee, initial encounter   M22.41 (ICD-10-CM) - Chondromalacia, patella, right       Visit # / Visits Authorized:  1 / 10   Date of Evaluation:  1/24/2025   Insurance Authorization Period:  01/13/2026  Plan of Care Certification:  1/24/2025 to 4/21/25      Time In:   0800   Time Out:  0900  Total Time: 60  Total Billable Time: 60         Subjective             Past Medical History/Physical Systems Review:   Veronique Louise  has a past medical history of Anemia, Breast disorder, Gestational diabetes, HSV infection, Major depressive disorder, recurrent, unspecified, and Subclinical hyperthyroidism.    Veronique Louise  has a past surgical history that includes Intrauterine device insertion (2011); TONSILLECTOMY, ADENOIDECTOMY; Incision and drainage of breast (Left, 2015); Hysteroscopy; Exploratory laparotomy; Laparoscopic salpingectomy (Bilateral, 12/26/2019); Tubal ligation; and Colonoscopy (N/A, 11/30/2022).    Veronique has a current medication list which includes the following prescription(s): buspirone, buspirone, diclofenac sodium, lisdexamfetamine, latuda, lurasidone, lurasidone, norethindrone, wegovy, trazodone, and valacyclovir.    Review of patient's allergies indicates:   Allergen Reactions    Percocet [oxycodone-acetaminophen] Nausea And Vomiting     Causes extreme N/V. OK to take Hydrocodone.         Objective            Treatment:       Manual Therapy  Manual Therapy Activity 1: LAD to R LE  Manual Therapy Activity  "2: R knee flexion gapping  Manual Therapy Activity 3: R Tibiofemoral JM AP glides Gr III    Balance/Neuromuscular Re-Education  Balance/Neuromuscular Re-Education Activity 1: TRX squats, 3x10  Balance/Neuromuscular Re-Education Activity 2: Elliptical x 8 min L2  Balance/Neuromuscular Re-Education Activity 3: Hip abd and prone hip ext 2# 3x10  Balance/Neuromuscular Re-Education Activity 4: LAQ 5# + ball squeeze, 3x10  Balance/Neuromuscular Re-Education Activity 5: R SLS ball toss at rebounder + airex 30x  Balance/Neuromuscular Re-Education Activity 6: R SL leg press, 75# 3x10  Balance/Neuromuscular Re-Education Activity 7: R Step ups 6" 3x10  Balance/Neuromuscular Re-Education Activity 8: TKE red band, 3x10    Neuromuscular re-education x 30 min for improving strength and motor control:   Performed blood flow restriction with 148 mmHg (60% of LOP) on R LE  extremity with Susan Unit, skin protectant sleeve and green cuff. Patient screened for any precautions or contraindications prior to its use and continuously monitored for any adverse events. Patient given 30 sec rest breaks between sets and 1 min rest break between   SLR 30/10/10/10    Therapeutic Activity  Therapeutic Activity 1: Hesitation marching + 10# 60ftx4  Therapeutic Activity 2: Sit to stand from 18" box + 10# KB 3x10         Patient's spiritual, cultural, and educational needs considered and patient agreeable to plan of care and goals.     Assessment & Plan   Assessment:               Goals:  Short Term Goals: 2 weeks          1. Decreased R knee pain to 8/10  Progressing, not met         2. Improved knee arom to R knee ext to 0 Progressing, not met        3. Improved FOTO score Progressing, not met        Long Term Goals: 12 weeks     Independent with HEP. Progressing, not met  Report decreased    R knee    pain  <   / =  2  /10 with adls such as car t/f, squatting , floor transfers Progressing, not met  Increased MMT  for  R quads and hip abd to 5/5  " Progressing, not met  Increased arom  for  R knee 0 to 145 Progressing, not met  Improved FOTO score to 66    Progressing, not met    Plan:         From a physical therapy perspective, the patient would benefit from: Skilled Rehab Services    Planned therapy interventions include: Therapeutic exercise, Therapeutic activities, Neuromuscular re-education, Manual therapy, and ADLs/IADLs.            Visit Frequency: 1 times Per Week for 12 Weeks.       This plan was discussed with Patient.

## 2025-02-19 ENCOUNTER — OFFICE VISIT (OUTPATIENT)
Dept: PSYCHIATRY | Facility: CLINIC | Age: 45
End: 2025-02-19
Payer: COMMERCIAL

## 2025-02-19 DIAGNOSIS — F33.0 MILD EPISODE OF RECURRENT MAJOR DEPRESSIVE DISORDER: ICD-10-CM

## 2025-02-19 DIAGNOSIS — F31.81 BIPOLAR II DISORDER: Primary | ICD-10-CM

## 2025-02-19 NOTE — PROGRESS NOTES
"The patient location is: Home in [place]  The chief complaint leading to consultation is: bipolar depression and relationship problems. She wanted to work today on the goal of "making myself a priority."     Visit type: audiovisual    Face to Face time with patient: 50  60 minutes of total time spent on the encounter, which includes face to face time and non-face to face time preparing to see the patient (eg, review of tests), Obtaining and/or reviewing separately obtained history, Documenting clinical information in the electronic or other health record, Independently interpreting results (not separately reported) and communicating results to the patient/family/caregiver, or Care coordination (not separately reported).     Each patient to whom he or she provides medical services by telemedicine is:  (1) informed of the relationship between the physician and patient and the respective role of any other health care provider with respect to management of the patient; and (2) notified that he or she may decline to receive medical services by telemedicine and may withdraw from such care at any time.    Notes: Individual Psychotherapy (PhD/LCSW)    02/19/2025    Site:  [facility], [place]         Therapeutic Intervention: Met with patient.  Outpatient - Insight oriented psychotherapy 60 min - CPT code 73293    Chief complaint/reason for encounter: mood swings, anxiety, and interpersonal problems.      Interval history and content of current session: She is struggling with motherhood and also struggling with loving two people. Asked insight oriented questions to understand her thoughts and feelings. Used CBT to understand her thinking and core beliefs. She had a lot of thoughts today.  Articulated her busy thinking and she was able to drop out of it and go a little deeper.   We worked on the issue of her being triggered by her  not helping in the kitchen and with the kids. Taught her the FFMe process of traumatic stress " recover. Will send her a handout. Step one is awareness. Step two is to deactivate the nervous system when triggered. We practiced in session. Homework. See if you can notice when triggered and pause. Will discuss next time.     Treatment plan:  Target symptoms: recurrent depression, anxiety , mood swings, irritability  Why chosen therapy is appropriate versus another modality: relevant to diagnosis, evidence based practice  Outcome monitoring methods: self-report, observation  Therapeutic intervention type: insight oriented psychotherapy, interactive psychotherapy    Risk parameters:  Patient reports no suicidal ideation  Patient reports no homicidal ideation  Patient reports no self-injurious behavior  Patient reports no violent behavior    Verbal deficits: None    Patient's response to intervention:  The patient's response to intervention is accepting.    Progress toward goals and other mental status changes:  The patient's progress toward goals is good.    Diagnosis:   Bipolar II disorder  MDD, recurrent, unspecified    Plan:  individual psychotherapy    Return to clinic: 2 weeks    Length of Service (minutes): 60

## 2025-02-26 ENCOUNTER — CLINICAL SUPPORT (OUTPATIENT)
Dept: OTHER | Facility: CLINIC | Age: 45
End: 2025-02-26

## 2025-02-26 DIAGNOSIS — Z00.8 ENCOUNTER FOR OTHER GENERAL EXAMINATION: ICD-10-CM

## 2025-02-27 VITALS
BODY MASS INDEX: 33.29 KG/M2 | WEIGHT: 195 LBS | DIASTOLIC BLOOD PRESSURE: 78 MMHG | SYSTOLIC BLOOD PRESSURE: 126 MMHG | HEIGHT: 64 IN

## 2025-02-27 LAB
GLUCOSE SERPL-MCNC: 79 MG/DL (ref 60–140)
HDLC SERPL-MCNC: 67 MG/DL
POC CHOLESTEROL, LDL (DOCK): 99 MG/DL
POC CHOLESTEROL, TOTAL: 180 MG/DL
TRIGL SERPL-MCNC: 78 MG/DL

## 2025-03-06 ENCOUNTER — CLINICAL SUPPORT (OUTPATIENT)
Dept: REHABILITATION | Facility: OTHER | Age: 45
End: 2025-03-06
Payer: COMMERCIAL

## 2025-03-06 DIAGNOSIS — M25.561 ACUTE PAIN OF RIGHT KNEE: Primary | ICD-10-CM

## 2025-03-06 PROCEDURE — 97530 THERAPEUTIC ACTIVITIES: CPT | Mod: PN | Performed by: INTERNAL MEDICINE

## 2025-03-06 PROCEDURE — 97112 NEUROMUSCULAR REEDUCATION: CPT | Mod: PN | Performed by: INTERNAL MEDICINE

## 2025-03-06 NOTE — PROGRESS NOTES
Physical Therapy Daily Treatment Note     Name: Veronique Nixon Eminence  Clinic Number: [id number]    Therapy Diagnosis:   Encounter Diagnosis   Name Primary?    Acute pain of right knee Yes     Physician: Cruz Vasquez PA-C    Visit Date: 3/6/2025             Physician Orders: Eval and Treat  Medical Diagnosis:   S83.281A (ICD-10-CM) - Acute lateral meniscus tear of right knee, initial encounter   M22.41 (ICD-10-CM) - Chondromalacia, patella, right         Visit # / Visits Authorized:  4/ 10   Date of Evaluation:  1/24/2025   Progress note needed 4/5/25  Insurance Authorization Period:  01/13/2026  Plan of Care Certification:  1/24/2025 to 4/21/25         Time In: 902 am   Time Out: 10 am   Total Billable Time: 58 minutes    Precautions: Standard    Subjective     Pt reports: more rom in squat but not some weakness and pain. L ankle ok just stiffness/ weakness with squats.   She was compliant with home exercise program.  Response to previous treatment: less pain  Functional change: less pain with squatting but cont weaknss    Pain: 7/10 with squatting  Location: right knee      Objective   nee Observations     Good B Patellar jm, lateral ttp R knee joint line         Knee Range of Motion   Right Knee    Active (deg) Passive (deg) Pain   Flexion 135   Yes   Extension -1             Left Knee    Active (deg) Passive (deg) Pain   Flexion 145       Extension 0            1/24/25  ROM LB flat back  100 %  Ext  50 % mo pain  SB B 75%      1/24  Ankle/Foot Range of Motion   Right Ankle/Foot    Active (deg) Passive (deg) Pain   Dorsiflexion (KE) 4       Dorsiflexion (KF)         Plantar Flexion   66     Ankle Inversion 34       Ankle Eversion 14       Subtalar Inversion         Subtalar Eversion         Great Toe MTP Flexion         Great Toe MTP Extension         Great Toe IP Flexion               Left Ankle/Foot    Active (deg) Passive (deg) Pain   Dorsiflexion (KE) 4       Dorsiflexion (KF)         Plantar Flexion 70        Ankle Inversion 30       Ankle Eversion 12       Subtalar Inversion         Subtalar Eversion         Great Toe MTP Flexion         Great Toe MTP Extension         Great Toe IP Flexion                                 Hip Strength - Planes of Motion    Right Strength Right Pain Left Strength Left  Pain   Flexion (L2) 5   5     Extension 5         ABduction 5   5     ADduction           Internal Rotation  5    5     External Rotation  5    5           Knee Strength    Right Strength Right Pain Left Strength Left  Pain   Flexion (S2) 5   5     Prone Flexion           Extension (L3) 4   5              Ankle/Foot Strength - Planes of Motion    Right Strength Right Pain Left Strength Left  Pain   Dorsiflexion (L4)  5    5     Plantar Flexion (S1)  5    5     Inversion           Eversion           Great Toe Flexion           Great Toe Extension (L5)           Lesser Toes Flexion           Lesser Toes Extension             1/25/25Microfet    Inversion R  , L 10.3, 11.1, 12                  R 14,3, 14,5 , 12.3    R avg 11, L  13.7. L is stronger than R      1/25  Hip Special Tests  90/90 Hamstring Test  Negative: Right and Left         1/25  Knee Special Tests  Knee Ligament Tests  Negative: Right Anterior Drawer and Left Anterior Drawer  Negative: Right Valgus Stress at 30 Degrees        bounce home neg            3/6/2025  30 seconds sit<>stand: 13  Measures LE functional strength  Normal:   Age Male Female   60-64 17 15   65-69 16 15   70-74 15 14   75-79 14 13   80-84 13 12   85-89 11 11   90-94 9 9                Ambulation Assistance Required  Surface With  Assistive Device Without Assistive Device Details   Level   Independent      Uneven         Curb             SLS B 20 sec decreased stability R   Lloyd neg B      Intake Outcome Measure for FOTO Survey     Therapist reviewed FOTO scores for Veronique Hussain Dani on 3/6/2025    FOTO report - see Media section or FOTO account episode details.      Intake  "Score: 86, goal 68  %    Y anterior balance 3/6/2025  59, 60, 60 L = 60  R 62, 63, 60 = 62  R better than L            Treatment:     Manual Therapy  Manual Therapy Activity 1: LAD to R LE  Manual Therapy Activity 2: R knee flexion gapping  Manual Therapy Activity 3: R Tibiofemoral JM AP glides Gr III     Balance/Neuromuscular Re-Education        Balance/Neuromuscular Re-Education Activity x 25 min    reassesement 10 min  Balance/Neuromuscular Re-Education Activity 3: Hip abd and prone hip ext 2# 3x10   Balance/Neuromuscular Re-Education Activity 5: R SLS ball toss at rebounder + airex 30x   Balance/Neuromuscular Re-Education Activity 7: R Step ups 6" 3x10        Neuromuscular re-education x 30 min for improving strength and motor control:   Performed blood flow restriction with 148 mmHg (66% of LOP) on R LE  extremity with Susan Unit, skin protectant sleeve and green cuff. Patient screened for any precautions or contraindications prior to its use and continuously monitored for any adverse events. Patient given 30 sec rest breaks between sets and 1 min rest break between   SLR 30/10/10/10   Balance/Neuromuscular Re-Education Activity 4: Blood flow restricton therapy SLR 30/15/15/15 60 % LOP  Balance/Neuromuscular Re-Education Activity 5: BFR LAQ 5 # 30/15/15/15/15   70 % LOP     Balance/Neuromuscular Re-Education Activity 7: sls foam pad 20 sec x 3 each foot , UE A occasionally     Therapeutic Activity for r/t function x 35 minutes including:   Therapeutic Activity 1: Hesitation marching + 10# 60ftx4  Therapeutic Activity 2: Sit to stand from 18" box + 10# KB 3x10   reassesement 10 min   Elliptical x 6 min L2  Mini squATS 66% 30.15/15/15  Balance/Neuromuscular Re-Education Activity 6: U leg press BFR 50# 30/15/15/15 65% LOP   U squat added to hep 10x       Add prone flex strap nv    Home Exercises Provided and Patient Education Provided     Education provided:   - cont strengthening quads    Written Home Exercises " Provided: yes.  Exercises were reviewed and Veronique was able to demonstrate them prior to the end of the session.  Veronique demonstrated good  understanding of the education provided.     See EMR under Patient Instructions for exercises provided 3/6/2025.    Assessment     Progressing with improved ex ability, decreased pain but cont c/o weakness R quad and decreased reps for her age with sit to  30 sec. Met foto goal and seems to be progressing well with BFR.     Veronique Is progressing well towards her goals.   Pt prognosis is Good.     Pt will continue to benefit from skilled outpatient physical therapy to address the deficits listed in the problem list box on initial evaluation, provide pt/family education and to maximize pt's level of independence in the home and community environment.     Pt's spiritual, cultural and educational needs considered and pt agreeable to plan of care and goals.    Anticipated barriers to physical therapy: meniscus tear    Goals:      Short Term Goals: 2 weeks          1. Decreased R knee pain to 8/10   met         2. Improved knee arom to R knee ext to 0  met        3. Improved FOTO score  met           Long Term Goals: 12 weeks      Independent with HEP. Progressing, not met  Report decreased    R knee    pain  <   / =  2  /10 with adls such as car t/f, squatting , floor transfers Progressing, not met  Increased MMT  for  R quads and hip abd to 5/5  Progressing, not met, met for hip abd.  Increased arom  for  R knee 0 to 145 Progressing, not met  Improved FOTO score to 66      met       Plan           From a physical therapy perspective, the patient would benefit from: Skilled Rehab Services     Planned therapy interventions include: Therapeutic exercise, Therapeutic activities, Neuromuscular re-education, Manual therapy, and ADLs/IADLs.            Loreto Domingo, PT

## 2025-03-07 ENCOUNTER — OFFICE VISIT (OUTPATIENT)
Dept: PSYCHIATRY | Facility: CLINIC | Age: 45
End: 2025-03-07
Payer: COMMERCIAL

## 2025-03-07 DIAGNOSIS — F31.81 BIPOLAR II DISORDER: Primary | ICD-10-CM

## 2025-03-07 DIAGNOSIS — F41.1 GENERALIZED ANXIETY DISORDER: ICD-10-CM

## 2025-03-07 PROCEDURE — 1159F MED LIST DOCD IN RCRD: CPT | Mod: CPTII,95,, | Performed by: PSYCHOLOGIST

## 2025-03-07 PROCEDURE — 90837 PSYTX W PT 60 MINUTES: CPT | Mod: 95,,, | Performed by: PSYCHOLOGIST

## 2025-03-07 NOTE — PROGRESS NOTES
The patient location is: Home in [place]  The chief complaint leading to consultation is: bipolar depression and relationship problems. She wanted to work today on the goal of reducing her frustration with her  and be less bothered by being in two relationships.     Visit type: audiovisual    Face to Face time with patient: 50  60 minutes of total time spent on the encounter, which includes face to face time and non-face to face time preparing to see the patient (eg, review of tests), Obtaining and/or reviewing separately obtained history, Documenting clinical information in the electronic or other health record, Independently interpreting results (not separately reported) and communicating results to the patient/family/caregiver, or Care coordination (not separately reported).     Each patient to whom he or she provides medical services by telemedicine is:  (1) informed of the relationship between the physician and patient and the respective role of any other health care provider with respect to management of the patient; and (2) notified that he or she may decline to receive medical services by telemedicine and may withdraw from such care at any time.    Notes: Individual Psychotherapy (PhD/LCSW)    03/07/2025    Site:  [facility]         Therapeutic Intervention: Met with patient.  Outpatient - Insight oriented psychotherapy 60 min - CPT code 22035    Chief complaint/reason for encounter: mood swings, anxiety, and interpersonal problems.      Interval history and content of current session: She is struggling with motherhood and also struggling with loving two people. Asked insight oriented questions to understand her thoughts and feelings. Used CBT to understand her thinking and core beliefs. She is struggling with guilt secondary to her being in two relationships. Explored her thoughts. She is not completely comfortable with being in an open relationship. We will need to discuss this further. She wants  to journal about what makes her frustrated about her . She is going on a vacation with her bf. Processed through her concerns here. Encouraged present moment awareness and deep listening.   She requested sessions two times a week. Suggested she call her insurance as I do not think two times a week outpatient therapy is covered.   Treatment plan:  Target symptoms: recurrent depression, anxiety , mood swings, irritability  Why chosen therapy is appropriate versus another modality: relevant to diagnosis, evidence based practice  Outcome monitoring methods: self-report, observation  Therapeutic intervention type: insight oriented psychotherapy, interactive psychotherapy    Risk parameters:  Patient reports no suicidal ideation  Patient reports no homicidal ideation  Patient reports no self-injurious behavior  Patient reports no violent behavior    Verbal deficits: None    Patient's response to intervention:  The patient's response to intervention is accepting.    Progress toward goals and other mental status changes:  The patient's progress toward goals is good.    Diagnosis:   Bipolar II disorder  MDD, recurrent, unspecified    Plan:  individual psychotherapy    Return to clinic: 1 week    Length of Service (minutes): 60

## 2025-03-10 ENCOUNTER — PATIENT MESSAGE (OUTPATIENT)
Dept: PSYCHIATRY | Facility: CLINIC | Age: 45
End: 2025-03-10
Payer: COMMERCIAL

## 2025-03-10 PROBLEM — F41.1 GENERALIZED ANXIETY DISORDER: Status: ACTIVE | Noted: 2025-03-10

## 2025-03-11 ENCOUNTER — OFFICE VISIT (OUTPATIENT)
Dept: PSYCHIATRY | Facility: CLINIC | Age: 45
End: 2025-03-11
Payer: COMMERCIAL

## 2025-03-11 DIAGNOSIS — F31.81 BIPOLAR II DISORDER: Primary | ICD-10-CM

## 2025-03-11 DIAGNOSIS — F41.1 GENERALIZED ANXIETY DISORDER: ICD-10-CM

## 2025-03-11 DIAGNOSIS — F90.9 ATTENTION DEFICIT HYPERACTIVITY DISORDER (ADHD), UNSPECIFIED ADHD TYPE: ICD-10-CM

## 2025-03-11 PROCEDURE — 90837 PSYTX W PT 60 MINUTES: CPT | Mod: 95,,, | Performed by: PSYCHOLOGIST

## 2025-03-11 PROCEDURE — 1159F MED LIST DOCD IN RCRD: CPT | Mod: CPTII,95,, | Performed by: PSYCHOLOGIST

## 2025-03-11 NOTE — PROGRESS NOTES
"The patient location is: She participated in a video session from her home in [facility]  The chief complaint leading to consultation is: Mood disorder, Bipolar II and Anxiety    Visit type: audiovisual    Face to Face time with patient: 51  60 minutes of total time spent on the encounter, which includes face to face time and non-face to face time preparing to see the patient (eg, review of tests), Obtaining and/or reviewing separately obtained history, Documenting clinical information in the electronic or other health record, Independently interpreting results (not separately reported) and communicating results to the patient/family/caregiver, or Care coordination (not separately reported).         Each patient to whom he or she provides medical services by telemedicine is:  (1) informed of the relationship between the physician and patient and the respective role of any other health care provider with respect to management of the patient; and (2) notified that he or she may decline to receive medical services by telemedicine and may withdraw from such care at any time.    Notes:     Outpatient Psychiatry Initial Visit  03/11/2025    History of Presenting Illness:  Pt is a 44 y.o. female who presents for psychotherapy to help her improve her mood and relationships. Patient was seen, examined and chart was reviewed. Patient reviewed and agreed to informed consent and limits of confidentiality.    Current Stressors: She states relationships with her  and her best gf are a challenge for her. She is in an open marriage. She is in love with her best gf. She is also stressed because she feels like she does not focus on herself. She focuses on her  and children. She wants to spend time getting to know herself and "what I want." Her 's drinking is a stressor. She has complicated grief. She lost a child in 2015.   Today worked on moods that cycle. Paid attention to her thinking. She has built up " resentment towards her , his passivity, his non-action at home, and his drinking. She is trying to not explode at him. She requested two times a week sessions. She called her insurance and was told it was OK. This therapist will need to discuss this with the clinic manager. Informed the patient I will get back to her.   Today used a thought record of an event where she was angry. It regarded her daughter's birthday. She actually handled the situation well. Gave her credit. She feels angry a lot. She feels depressed often. She is tearful in session. She is tired of being in her primary relationship but doesn't want to leave it. She feels trapped.     Psychiatric Symptoms Review    Depression Symptoms: Depressed or down mood for a few days followed by a hypomanic period, anhedonia, insomnia, psychomotor agitation, distressing thoughts.      Anxiety Symptoms: Worry, feeling tense, muscle tension, difficulty making decisions when stressed, difficulty letting go of worries, rapid heartbeat.     Mone Symptoms: More talkative, highly emotional, elevated mood, increased sexual drive, irritability, racing thoughts. Increased productivity, grandiosity.    Psychosis Symptoms: Pt denied current or history of related symptoms.    Attention/Concentration Symptoms: Pt stated she has been dx with ADHD. Difficulty focusing and concentrating.     Disordered Eating/Body Image Concerns: Pt denied current or history of related symptoms.    Suicidal Ideation and Risk:  Pt denied current or history of related symptoms.    Access to gun: unknown    Homicidal/Violent Ideation and Risk: Pt denied current or history of related symptoms.    Prior Mental Health Treatment:    Prior psychotherapy: She has been in therapy with a male therapist for years but there was a conflict when her best friend starting seeing her therapists wife for therapy.     Prior Psychiatric Hospitalizations:  Pt denied.     Current psychiatric medication:  Buspar, Vyvanse, Latuda, Trazadone    Family Psychiatric History: unknown    Current Medical Conditions Per Chart Review:   Patient Active Problem List   Diagnosis    Family history of colon cancer    Subclinical hyperthyroidism    Hypocalcemia    Status post bilateral salpingectomy    Major depressive disorder, recurrent, unspecified    BMI 37.0-37.9, adult    Bipolar II disorder    Obesity, unspecified    Acute knee pain    Generalized anxiety disorder        SOCIAL HISTORY    Relationships and Support Network:  She is  with two children. She had one daughter who  as a toddler. She is close to family and has supportive friends.       Employment and Education:  Employed by [employer].  Works in [employer].   Has a BA in Theatre      Abuse History:  Physical  Pt denied.  Emotional  Yes  Sexual   Pt denied.    Other trauma: Pt denied.    -----------------------------------------------------    Clinical Assessment:     Summary  Patient has been diagnosed with bipolar II disorder, ADHD, and PTSD.  She has a history of wanting to leave her  but then becoming overwhelmed with depression and fear. She feels emotional about her two primary relationships.     Diagnosis(es):   1) Bipolar II disorder  2) ADHD  3) PTSD (by patient report)       Ability to adhere to plan:  Cooperative    Rationale for employing these interactive techniques: Applicable to diagnosis    Plan      Goal #1: Learn how to make myself the priority   Goal #2: Explore the relationships she has with her  and bf  Goal #3: Help make decisions as a parent  Goal #4: Explore her identity as a nontraditional woman and consider living outside of society norms.     This author reviewed limits to confidentiality. Pt indicated understanding and denied any questions.    Return to Clinic: 1 week      -Conducted psychotherapy session  -Pt instructed to call clinic, 911 or go to nearest emergency room if sxs worsen or pt is in crisis. The pt expresses  understanding.    Each patient to whom he or she provides medical services by telemedicine is:  (1) informed of the relationship between the physician and patient and the respective role of any other health care provider with respect to management of the patient; and (2) notified that he or she may decline to receive medical services by telemedicine and may withdraw from such care at any time.         Mental Status Exam      Appearance: unremarkable  Grooming: appropriate to situation  Arousal: alert, awake  Behavior/Cooperation:Cooperative  Speech: normal tone, normal rate, normal pitch, normal volume  Language: appropriate english vocabulary  Mood: sad, exhausted  Affect: tearful  Thought Process: Normal and logical  Thought Content: : normal, no suicidality, no homicidality, delusions, or paranoia  Associations: no loose associations noted  Orientation: Grossly intact  Attention Span/Concentration: intact  Insight: good  Judgment: good    General: age appropriate, well nourished, casually dressed, neatly groomed         Charly Zambrano PsyD   Outpatient Psychiatry Short Term Therapy Visit    Return to clinic: 1 week    -Cognitive-Behavioral/Supportive therapy and psychoeducation provided  -Pt instructed to call clinic, 911 or go to nearest emergency room if sxs worsen or pt is in   crisis. The pt expresses understanding.    Prior to scheduling therapy and initiation of first therapy session, the potential drawbacks and risks of short-term therapy were discussed with patient. These include, but are not limited to: termination of therapy when patient believes they may continue to benefit and having to build a new relationship with a new therapist should the patient decide to continue psychotherapy. Pt expressed understanding and agreement to short-term therapy with a maximum of 8 sessions with this author.

## 2025-03-12 ENCOUNTER — CLINICAL SUPPORT (OUTPATIENT)
Dept: REHABILITATION | Facility: OTHER | Age: 45
End: 2025-03-12
Payer: COMMERCIAL

## 2025-03-12 DIAGNOSIS — M25.561 ACUTE PAIN OF RIGHT KNEE: Primary | ICD-10-CM

## 2025-03-12 PROCEDURE — 97112 NEUROMUSCULAR REEDUCATION: CPT | Mod: PN | Performed by: INTERNAL MEDICINE

## 2025-03-12 PROCEDURE — 97530 THERAPEUTIC ACTIVITIES: CPT | Mod: PN | Performed by: INTERNAL MEDICINE

## 2025-03-12 NOTE — PROGRESS NOTES
Physical Therapy Daily Treatment Note     Name: Veronique Nixon [place]  Clinic Number: [id number]    Therapy Diagnosis:   Encounter Diagnosis   Name Primary?    Acute pain of right knee Yes     Physician: Cruz Vasquez PA-C    Visit Date: 3/12/2025             Physician Orders: Eval and Treat  Medical Diagnosis:   S83.281A (ICD-10-CM) - Acute lateral meniscus tear of right knee, initial encounter   M22.41 (ICD-10-CM) - Chondromalacia, patella, right         Visit # / Visits Authorized: 5/ 10   Date of Evaluation:  1/24/2025   Progress note needed 4/5/25  Insurance Authorization Period:  01/13/2026  Plan of Care Certification:  1/24/2025 to 4/21/25         Time In:  9  am   Time Out: 958 am   Total Billable Time: 58 minutes    Precautions: Standard    Subjective     Pt reports: weakness with U squats R, pain is less. R quads still feel weak. Going on vacation for a week.  She was compliant with home exercise program.  Response to previous treatment: less pain  Functional change: less pain with squatting but cont weaknss    Pain: 7/10 with squatting  Location: right knee      Objective   knee Observations     Good B Patellar jm         3/6 Knee Range of Motion   Right Knee    Active (deg) Passive (deg) Pain   Flexion 135   Yes   Extension -1             Left Knee    Active (deg) Passive (deg) Pain   Flexion 145       Extension 0            1/24/25  ROM LB flat back  100 %  Ext  50 % mo pain  SB B 75%      1/24  Ankle/Foot Range of Motion   Right Ankle/Foot    Active (deg) Passive (deg) Pain   Dorsiflexion (KE) 4       Dorsiflexion (KF)         Plantar Flexion   66     Ankle Inversion 34       Ankle Eversion 14       Subtalar Inversion         Subtalar Eversion         Great Toe MTP Flexion         Great Toe MTP Extension         Great Toe IP Flexion               Left Ankle/Foot    Active (deg) Passive (deg) Pain   Dorsiflexion (KE) 4       Dorsiflexion (KF)         Plantar Flexion 70       Ankle Inversion 30        Ankle Eversion 12       Subtalar Inversion         Subtalar Eversion         Great Toe MTP Flexion         Great Toe MTP Extension         Great Toe IP Flexion                                 3/12 Hip Strength - Planes of Motion    Right Strength Right Pain Left Strength Left  Pain   Flexion (L2) 5   5     Extension 5         ABduction 5   5     ADduction           Internal Rotation  5    5     External Rotation  5    5           3/12 Knee Strength    Right Strength Right Pain Left Strength Left  Pain   Flexion (S2) 5   5     Prone Flexion           Extension (L3) 4   5              3/6 Ankle/Foot Strength - Planes of Motion    Right Strength Right Pain Left Strength Left  Pain   Dorsiflexion (L4)  5    5     Plantar Flexion (S1)  5    5     Inversion           Eversion                                         1/25/25Microfet    Inversion R  , L 10.3, 11.1, 12                  R 14,3, 14,5 , 12.3    R avg 11, L  13.7. L is stronger than R     3/12/2025 quads 70 65 deg   Tindeq L 48, 46, 46= 47  R 56, 46,46- some glute compensation :  49   R 105% of L but noticeable glute compensation     1/25  Hip Special Tests  90/90 Hamstring Test  Negative: Right and Left         1/25  Knee Special Tests  Knee Ligament Tests  Negative: Right Anterior Drawer and Left Anterior Drawer  Negative: Right Valgus Stress at 30 Degrees        bounce home neg            3/6/2025  30 seconds sit<>stand: 13  Measures LE functional strength  Normal:   Age Male Female   60-64 17 15   65-69 16 15   70-74 15 14   75-79 14 13   80-84 13 12   85-89 11 11   90-94 9 9                 3/6  SLS B 20 sec decreased stability R   Lloyd neg B      Intake Outcome Measure for FOTO Survey     Therapist reviewed FOTO scores for Veronique Louise on 3/6/2025    FOTO report - see Media section or FOTO account episode details.      Intake Score: 86, goal 68  % CLOSED    Y anterior balance 3/6/2025  59, 60, 60 L = 60  R 62, 63, 60 = 62  R better than L  "           Treatment:     Manual Therapy 0 min         Balance/Neuromuscular Re-Education Activity x 19 min     Balance/Neuromuscular Re-Education Activity 3: Hip abd    Balance/Neuromuscular Re-Education Activity 7: R Step ups 6" 3x10        Neuromuscular re-education x 30 min for improving strength and motor control:   Performed blood flow restriction with 148 mmHg (70% of LOP) on R LE  extremity with Susan Unit, skin protectant sleeve and green cuff. Patient screened for any precautions or contraindications prior to its use and continuously monitored for any adverse events. Patient given 30 sec rest breaks between sets and 1 min rest break between   SLR 30/10/10/10   Balance/Neuromuscular Re-Education Activity 4: Blood flow restricton therapy SLR 30/15/15/15 70 % LOP  Balance/Neuromuscular Re-Education Activity 5: BFR LAQ 5 # 30/15/15/15/15   70 % LOP     Balance/Neuromuscular Re-Education Activity 7: sls  plyo tramp yellow 15 x 3      Therapeutic Activity for r/t function x 39 minutes including:   Therapeutic Activity 1: Hesitation marching + 10# 60ftx4  Therapeutic Activity 2: Sit to stand from 18" box + 10# KB 3x10   reassesement 10 min tindeq  Elliptical x 6 min L2  Tm 5 min 2.5 mph   English squat reviewed for hep  10x  Mini squATS 66% 30.15/15/15 with BFR   6: U leg press BFR 50# 30/15/15/15 65% LOP with bfr   Step  up 6 in 30/15/15/15/ 70 %       Add prone flex strap nv    Home Exercises Provided and Patient Education Provided     Education provided:   - cont strengthening quads    Written Home Exercises Provided: not today  Exercises were reviewed and Veronique was able to demonstrate them prior to the end of the session.  Veronique demonstrated good  understanding of the education provided.     See EMR under Patient Instructions for exercises provided 3/6/2025.    Assessment     Progressing with improved ex ability and decreased pain. Reports cont weakness with squats R le and with [place] squats which " tindeq didn't reflect well due to glut max compensation. Will cont to  strengthen R quads and stability with SLS, reassess trunk rom next visit.     Veronique Is progressing well towards her goals.   Pt prognosis is Good.     Pt will continue to benefit from skilled outpatient physical therapy to address the deficits listed in the problem list box on initial evaluation, provide pt/family education and to maximize pt's level of independence in the home and community environment.     Pt's spiritual, cultural and educational needs considered and pt agreeable to plan of care and goals.    Anticipated barriers to physical therapy: meniscus tear    Goals:      Short Term Goals: 2 weeks          1. Decreased R knee pain to 8/10   met         2. Improved knee arom to R knee ext to 0  met        3. Improved FOTO score  met           Long Term Goals: 12 weeks      Independent with HEP. Progressing, not met  Report decreased    R knee    pain  <   / =  2  /10 with adls such as car t/f, squatting , floor transfers Progressing, not met  Increased MMT  for  R quads and hip abd to 5/5  Progressing, not met, met for hip abd.  Increased arom  for  R knee 0 to 145 Progressing, not met  Improved FOTO score to 66      met       Plan           From a physical therapy perspective, the patient would benefit from: Skilled Rehab Services     Planned therapy interventions include: Therapeutic exercise, Therapeutic activities, Neuromuscular re-education, Manual therapy, and ADLs/IADLs.            Loreto Domingo, PT

## 2025-03-21 ENCOUNTER — OFFICE VISIT (OUTPATIENT)
Dept: PSYCHIATRY | Facility: CLINIC | Age: 45
End: 2025-03-21
Payer: COMMERCIAL

## 2025-03-21 DIAGNOSIS — F31.81 BIPOLAR II DISORDER: Primary | ICD-10-CM

## 2025-03-21 DIAGNOSIS — F41.1 GENERALIZED ANXIETY DISORDER: ICD-10-CM

## 2025-03-25 ENCOUNTER — CLINICAL SUPPORT (OUTPATIENT)
Dept: REHABILITATION | Facility: OTHER | Age: 45
End: 2025-03-25
Payer: COMMERCIAL

## 2025-03-25 ENCOUNTER — OFFICE VISIT (OUTPATIENT)
Dept: PSYCHIATRY | Facility: CLINIC | Age: 45
End: 2025-03-25
Payer: COMMERCIAL

## 2025-03-25 DIAGNOSIS — F41.1 GENERALIZED ANXIETY DISORDER: ICD-10-CM

## 2025-03-25 DIAGNOSIS — M25.561 ACUTE PAIN OF RIGHT KNEE: Primary | ICD-10-CM

## 2025-03-25 DIAGNOSIS — F90.2 ATTENTION DEFICIT HYPERACTIVITY DISORDER (ADHD), COMBINED TYPE: Primary | ICD-10-CM

## 2025-03-25 DIAGNOSIS — F33.1 MODERATE EPISODE OF RECURRENT MAJOR DEPRESSIVE DISORDER: ICD-10-CM

## 2025-03-25 DIAGNOSIS — F31.81 BIPOLAR II DISORDER: ICD-10-CM

## 2025-03-25 PROCEDURE — 1159F MED LIST DOCD IN RCRD: CPT | Mod: CPTII,95,, | Performed by: PSYCHOLOGIST

## 2025-03-25 PROCEDURE — 97530 THERAPEUTIC ACTIVITIES: CPT | Mod: PN | Performed by: INTERNAL MEDICINE

## 2025-03-25 PROCEDURE — 90837 PSYTX W PT 60 MINUTES: CPT | Mod: 95,,, | Performed by: PSYCHOLOGIST

## 2025-03-25 PROCEDURE — 97112 NEUROMUSCULAR REEDUCATION: CPT | Mod: PN | Performed by: INTERNAL MEDICINE

## 2025-03-25 NOTE — PROGRESS NOTES
"The patient location is: at home in [place].  The chief complaint leading to consultation is: bipolar, anxiety, ADHD and relationship issues.     Visit type: audiovisual    Face to Face time with patient: 52  60 minutes of total time spent on the encounter, which includes face to face time and non-face to face time preparing to see the patient (eg, review of tests), Obtaining and/or reviewing separately obtained history, Documenting clinical information in the electronic or other health record, Independently interpreting results (not separately reported) and communicating results to the patient/family/caregiver, or Care coordination (not separately reported).     Each patient to whom he or she provides medical services by telemedicine is:  (1) informed of the relationship between the physician and patient and the respective role of any other health care provider with respect to management of the patient; and (2) notified that he or she may decline to receive medical services by telemedicine and may withdraw from such care at any time.    Individual Psychotherapy (PhD/LCSW)    03/25/2025          Therapeutic Intervention: Met with patient.  Outpatient - Insight oriented psychotherapy 60 min - CPT code 38293    Chief complaint/reason for encounter: depression, mood swings, anxiety, and interpersonal problems.      Interval history and content of current session: She is struggling with her mental health. She is having emotional reactions which she wants to gain control over. She is in two relationships and is struggling emotionally and behaviorally. She puts "others first."   She is struggling with a decision to stay or go in her marriage. Psychoeducation on the importance of being decisive. She decided to stay in her marriage for now and reevaluate later in the year.   She wants to work on multiple projects in her life and gets overwhelmed. Discussed prioritizing three projects. We will do a reverse timeline next session. " She struggles with organization with ADHD.     Treatment plan:  Target symptoms: depression, lack of focus, anxiety , mood swings  Why chosen therapy is appropriate versus another modality: relevant to diagnosis, patient responds to this modality  Outcome monitoring methods: self-report  Therapeutic intervention type: insight oriented psychotherapy, interactive psychotherapy    Risk parameters:  Patient reports no suicidal ideation  Patient reports no homicidal ideation  Patient reports no self-injurious behavior  Patient reports no violent behavior    Verbal deficits: None    Patient's response to intervention:  The patient's response to intervention is accepting.    Progress toward goals and other mental status changes:  The patient's progress toward goals is good.    Diagnosis:   Bipolar II Disorder  DALTON  MDD, moderate, recurrent  ADHD     Plan:  individual psychotherapy    Return to clinic:   No follow-ups on file.     Length of Service (minutes): 60

## 2025-03-25 NOTE — PROGRESS NOTES
Physical Therapy Daily Treatment/ dc  Note     Name: Veronique Nixon [place]  Clinic Number: [id number]    Therapy Diagnosis:   Encounter Diagnosis   Name Primary?    Acute pain of right knee Yes     Physician: Cruz Vasquez PA-C    Visit Date: 3/25/2025             Physician Orders: Eval and Treat  Medical Diagnosis:   S83.281A (ICD-10-CM) - Acute lateral meniscus tear of right knee, initial encounter   M22.41 (ICD-10-CM) - Chondromalacia, patella, right         Visit # / Visits Authorized: 5/ 10   Date of Evaluation:  1/24/2025   Progress note needed 4/5/25  Insurance Authorization Period:  01/13/2026  Plan of Care Certification:  1/24/2025 to 4/21/25         Time In:  820   am   Time Out: 920 am   Total Billable Time: 60 minutes    Precautions: Standard  Treatment included pt ed, hep, nmr, BFR, ex, there activ    Subjective     Pt reports: sometimes R ankle feels stiff. No pain with adls/ squats but R knee still feels weak. Thinks she can continue strengthening on her own.   She was compliant with home exercise program.  Response to previous treatment: less pain  Functional change: less pain with squatting but cont weaknss    Pain: 7/10 with squatting  Location: right knee      Objective   knee Observations     Good B Patellar jm         3/25 Knee Range of Motion   Right Knee    Active (deg) Passive (deg) Pain   Flexion 139   Yes   Extension -1             Left Knee    Active (deg) Passive (deg) Pain   Flexion 145       Extension 0            1/24/25  ROM LB flat back  100 %  Ext  50 % mo pain  SB B 75%      1/24  Ankle/Foot Range of Motion   Right Ankle/Foot    Active (deg) Passive (deg) Pain   Dorsiflexion (KE) 4       Dorsiflexion (KF)         Plantar Flexion   66     Ankle Inversion 34       Ankle Eversion 14       Subtalar Inversion         Subtalar Eversion         Great Toe MTP Flexion         Great Toe MTP Extension         Great Toe IP Flexion               Left Ankle/Foot    Active (deg) Passive  (deg) Pain   Dorsiflexion (KE) 4       Dorsiflexion (KF)         Plantar Flexion 70       Ankle Inversion 30       Ankle Eversion 12       Subtalar Inversion         Subtalar Eversion         Great Toe MTP Flexion         Great Toe MTP Extension         Great Toe IP Flexion                                 3/12 Hip Strength - Planes of Motion    Right Strength Right Pain Left Strength Left  Pain   Flexion (L2) 5   5     Extension 5         ABduction 5   5     ADduction           Internal Rotation  5    5     External Rotation  5    5           3/12 Knee Strength    Right Strength Right Pain Left Strength Left  Pain   Flexion (S2) 5   5     Prone Flexion           Extension (L3) 4   5              3/6 Ankle/Foot Strength - Planes of Motion    Right Strength Right Pain Left Strength Left  Pain   Dorsiflexion (L4)  5    5     Plantar Flexion (S1)  5    5     Inversion           Eversion                                         1/25/25Microfet    Inversion R  , L 10.3, 11.1, 12                  R 14,3, 14,5 , 12.3    R avg 11, L  13.7. L is stronger than R     3/12/2025 quads 70  deg   Tindeq L 48, 46, 46= 47  R 56, 46,46- some glute compensation :  49   R 105% of L but noticeable glute compensation                3/6/2025  30 seconds sit<>stand: 13  Measures LE functional strength  Normal:   Age Male Female   60-64 17 15   65-69 16 15   70-74 15 14   75-79 14 13   80-84 13 12   85-89 11 11   90-94 9 9                 3/6  SLS B 20 sec decreased stability R   Fabers neg B      Intake Outcome Measure for FOTO Survey     Therapist reviewed FOTO scores for Veronique Louise on 3/6/2025    FOTO report - see Media section or FOTO account episode details.      Intake Score: 86, goal 68  % CLOSED    Y anterior balance 3/6/2025  59, 60, 60 L = 60  R 62, 63, 60 = 62  R better than L            Treatment:     Manual Therapy 0 min     Prone flex strap 2 min      Balance/Neuromuscular Re-Education Activity x 40 min  "    Balance/Neuromuscular Re-Education Activity 3: Hip abd    Balance/Neuromuscular Re-Education Activity 7: R Step ups 6" 3x10        Neuromuscular re-education x 30 min for improving strength and motor control:   Performed blood flow restriction with 148 mmHg (70% of LOP) on R LE  extremity with Susan Unit, skin protectant sleeve and green cuff. Patient screened for any precautions or contraindications prior to its use and continuously monitored for any adverse events. Patient given 30 sec rest breaks between sets and 1 min rest break between   SLR 30/10/10/10   Balance/Neuromuscular Re-Education Activity 4: Blood flow restricton therapy SLR 30/15/15/15 70 % LOP  Balance/Neuromuscular Re-Education Activity 5: BFR LAQ 5 # 30/15/15/15/15   70 % LOP     Balance/Neuromuscular Re-Education Activity 7: sls  plyo tramp yellow 15 x 3    U HR 3 x 10 R  Around world 5 # 10x B sls    Therapeutic Activity for r/t function x 20 minutes including:       [place] squat  30/15/15/15/ 70 %  Mini squATS 66% 30.15/15/15 with BFR   6: U leg press BFR 25# 30/15/15/15 at 50 # 70% LOP with bfr   Step  up 6 in 30/15/15/15/ 70 %            Home Exercises Provided and Patient Education Provided     Education provided:   - cont strengthening quads    Written Home Exercises Provided: prone flex strap  Exercises were reviewed and Veronique was able to demonstrate them prior to the end of the session.  Veronique demonstrated good  understanding of the education provided.     See EMR under Patient Instructions for exercises provided 3/6/2025, 3/25    Assessment     Progressing with improved ex ability and decreased pain. Reports cont weakness  but no pain during session. TIndeq and Ant Y balance both with good scores R vs L .  Issued prone flex strap to help attain last few degrees flex.     Anticipated barriers to physical therapy: meniscus tear    Goals:      Short Term Goals: 2 weeks          1. Decreased R knee pain to 8/10   met         2. " Improved knee arom to R knee ext to 0  met        3. Improved FOTO score  met           Long Term Goals: 12 weeks      Independent with HEP. Progressing, not met  Report decreased    R knee    pain  <   / =  2  /10 with adls such as car t/f, squatting , floor transfers  met  Increased MMT  for  R quads and hip abd to 5/5   met    Increased arom  for  R knee 0 to 145 Progressing, not met  Improved FOTO score to 66      met       Plan      DC from PT    Loreto Domingo, PT

## 2025-03-28 ENCOUNTER — OFFICE VISIT (OUTPATIENT)
Dept: PSYCHIATRY | Facility: CLINIC | Age: 45
End: 2025-03-28
Payer: COMMERCIAL

## 2025-03-28 DIAGNOSIS — F31.81 BIPOLAR II DISORDER: Primary | ICD-10-CM

## 2025-03-28 DIAGNOSIS — F41.1 GENERALIZED ANXIETY DISORDER: ICD-10-CM

## 2025-03-28 DIAGNOSIS — F90.2 ATTENTION DEFICIT HYPERACTIVITY DISORDER (ADHD), COMBINED TYPE: ICD-10-CM

## 2025-03-28 NOTE — PROGRESS NOTES
The patient location is: at home in [place].   The chief complaint leading to consultation is: Bipolar II disorder, ADHD, DALTON, MDD.    Visit type: audiovisual    Face to Face time with patient: 52  60 minutes of total time spent on the encounter, which includes face to face time and non-face to face time preparing to see the patient (eg, review of tests), Obtaining and/or reviewing separately obtained history, Documenting clinical information in the electronic or other health record, Independently interpreting results (not separately reported) and communicating results to the patient/family/caregiver, or Care coordination (not separately reported).       Each patient to whom he or she provides medical services by telemedicine is:  (1) informed of the relationship between the physician and patient and the respective role of any other health care provider with respect to management of the patient; and (2) notified that he or she may decline to receive medical services by telemedicine and may withdraw from such care at any time.      Individual Psychotherapy (PhD/LCSW)     3/28/2025      Therapeutic Intervention: Met with patient.  Outpatient - Insight oriented psychotherapy 60 min - CPT code 20058    Chief complaint/reason for encounter: depression, mood swings, anxiety, and interpersonal.     Interval history and content of current session: Discussion of her homework. She decided to make a decision about whether to stay or leave in her marriage. She decided to stay in her marriage and work on her boundaries.   She is digging deep and working hard on her inner world and sharing it with this therapist. Supported her hard work by providing active listening and asking insight oriented questions.   It was the anniversary of her daughter's death on Wednesday. She went to the [facility] site. Allowed her to grieve in session. She was able to own how far she has come in her grieving process. She saw how she has survived this death and  is still living. Gave her credit.  Worked on relationship issues and boundaries today. Introduced the idea of 100% personal responsibility and if she cannot do something then ask for help.     Treatment plan:  Target symptoms: anxiety , mood disorder, interpersonal  Why chosen therapy is appropriate versus another modality: relevant to diagnosis, evidence based practice  Outcome monitoring methods: self-report  Therapeutic intervention type: insight oriented psychotherapy    Risk parameters:  Patient reports no suicidal ideation  Patient reports no homicidal ideation  Patient reports no self-injurious behavior  Patient reports no violent behavior    Verbal deficits: None    Patient's response to intervention:  The patient's response to intervention is accepting.    Progress toward goals and other mental status changes:  The patient's progress toward goals is good.    Diagnosis:   Bipolar II Disorder  DALTON  ADHD     Plan:  individual psychotherapy    Return to clinic:   1 week    Length of Service (minutes): 60

## 2025-04-01 ENCOUNTER — OFFICE VISIT (OUTPATIENT)
Dept: PSYCHIATRY | Facility: CLINIC | Age: 45
End: 2025-04-01
Payer: COMMERCIAL

## 2025-04-01 DIAGNOSIS — F33.1 MODERATE EPISODE OF RECURRENT MAJOR DEPRESSIVE DISORDER: ICD-10-CM

## 2025-04-01 DIAGNOSIS — F31.81 BIPOLAR II DISORDER: Primary | ICD-10-CM

## 2025-04-01 DIAGNOSIS — F41.1 GENERALIZED ANXIETY DISORDER: ICD-10-CM

## 2025-04-01 DIAGNOSIS — F90.2 ATTENTION DEFICIT HYPERACTIVITY DISORDER (ADHD), COMBINED TYPE: ICD-10-CM

## 2025-04-01 PROCEDURE — 1159F MED LIST DOCD IN RCRD: CPT | Mod: CPTII,95,, | Performed by: PSYCHOLOGIST

## 2025-04-01 PROCEDURE — 90837 PSYTX W PT 60 MINUTES: CPT | Mod: 95,,, | Performed by: PSYCHOLOGIST

## 2025-04-01 NOTE — PROGRESS NOTES
"The patient location is: at home in [place].  The chief complaint leading to consultation is: bipolar disorder, DALTON, ADHD.    Visit type: audiovisual    Face to Face time with patient: 54  60 minutes of total time spent on the encounter, which includes face to face time and non-face to face time preparing to see the patient (eg, review of tests), Obtaining and/or reviewing separately obtained history, Documenting clinical information in the electronic or other health record, Independently interpreting results (not separately reported) and communicating results to the patient/family/caregiver, or Care coordination (not separately reported).       Each patient to whom he or she provides medical services by telemedicine is:  (1) informed of the relationship between the physician and patient and the respective role of any other health care provider with respect to management of the patient; and (2) notified that he or she may decline to receive medical services by telemedicine and may withdraw from such care at any time.  Individual Psychotherapy (PhD/LCSW)    04/01/2025      Therapeutic Intervention: Met with patient.  Outpatient - Insight oriented psychotherapy 60 min - CPT code 00853    Chief complaint/reason for encounter: depression, mood swings, anxiety, and interpersonal     Interval history and content of current session: She said therapy is helping her. She is communicating better with her . She is being less emotional and more direct. She is doing more around the house and spending less energy blaming him. She feels better. She was honest about her feelings with her . Her 's drinking decreased.   Articulated how she is learning to live in a relaxed mind and body; more in the flow versus in her anxious thinking. Asked her to notice when it happens that her personal thinking wants to take over.   We continue to overcome codependency in her relationships.   She is "delighted she is inhabiting her " "space more."   She is teaching her daughter's boundaries.   We talked about parenting. She has a 15 year old.     Treatment plan:  Target symptoms: depression, anxiety , mood swings, parenting  Why chosen therapy is appropriate versus another modality: relevant to diagnosis, patient responds to this modality  Outcome monitoring methods: self-report  Therapeutic intervention type: insight oriented psychotherapy    Risk parameters:  Patient reports no suicidal ideation  Patient reports no homicidal ideation  Patient reports no self-injurious behavior  Patient reports no violent behavior    Verbal deficits: None    Patient's response to intervention:  The patient's response to intervention is accepting.    Progress toward goals and other mental status changes:  The patient's progress toward goals is excellent.    Diagnosis:   Bipolar II Disorder  DALTON  ADHD    Plan:  individual psychotherapy    Return to clinic:   1 week    Length of Service (minutes): 60    "

## 2025-04-04 ENCOUNTER — PATIENT MESSAGE (OUTPATIENT)
Dept: PSYCHIATRY | Facility: CLINIC | Age: 45
End: 2025-04-04
Payer: COMMERCIAL

## 2025-04-04 ENCOUNTER — OFFICE VISIT (OUTPATIENT)
Dept: PSYCHIATRY | Facility: CLINIC | Age: 45
End: 2025-04-04
Payer: COMMERCIAL

## 2025-04-04 DIAGNOSIS — F31.81 BIPOLAR II DISORDER: Primary | ICD-10-CM

## 2025-04-04 DIAGNOSIS — F90.2 ATTENTION DEFICIT HYPERACTIVITY DISORDER (ADHD), COMBINED TYPE: ICD-10-CM

## 2025-04-04 DIAGNOSIS — F41.1 GENERALIZED ANXIETY DISORDER: ICD-10-CM

## 2025-04-04 NOTE — PROGRESS NOTES
Outpatient Psychiatry Short Term Therapy Visit    Clinical Status of Patient: Outpatient (Ambulatory)  04/04/2025     Chief Complaint:    1) Bipolar Disorder  2)  Interpersonal Problems     Interval History and Content of Current Session:  Interim Events/Subjective Report/Content of Current Session:  This session was session # 10    The session started with a review of therapy goal(s).       Risk Parameters:  Patient reports no suicidal ideation  Patient reports no homicidal ideation  Patient reports no self-injurious behavior  Patient reports no violent behavior    Assessment and Diagnosis   Status/Progress: This session she said she wrote down the items she wanted to work on. Used CBT to work through her issues. One was related to a conversation she has to have with her child's school. Used a thought record to work through the issue. She was able to talk back to her inner critic.   She also wanted to work on mood swings. Will work on this in our next session as the time ran out.      Impression: Pt presents with concerns related to mood problems and interpersonal problems. Will continue short-term therapy focused on patient's concerns.    Diagnosis:   Bipolar II  DALTON  ADHD     Intervention/Counseling/Treatment Plan     1. Continue with therapy with this author     2. Call to report any worsening of symptoms or problems with the medication. Pt instructed to go to ER with thoughts of harming self, others    Psychotherapy:   Target symptom(s):   Why chosen therapy is appropriate versus another modality: CBT used; relevant to diagnosis, patient responds to this modality  Outcome monitoring methods: self-report, observation  Therapeutic intervention type: Cognitive Behavioral Therapy  Topics discussed/themes: building skills sets for symptom management, symptom recognition, nutrition, exercise  The patient's response to the intervention is good  Patient's response to treatment is: good   The patient's progress toward  treatment goals: improving     Return to clinic:   One week    -Cognitive-Behavioral/Supportive therapy and psychoeducation provided  -Pt instructed to call clinic, 911 or go to nearest emergency room if sxs worsen or pt is in   crisis. The pt expresses understanding.    Prior to scheduling therapy and initiation of first therapy session, the potential drawbacks and risks of short-term therapy were discussed with patient. These include, but are not limited to: termination of therapy when patient believes they may continue to benefit and having to build a new relationship with a new therapist should the patient decide to continue psychotherapy. Pt expressed understanding and agreement to short-term therapy with a maximum of 8 sessions with this author.

## 2025-04-11 ENCOUNTER — OFFICE VISIT (OUTPATIENT)
Dept: PSYCHIATRY | Facility: CLINIC | Age: 45
End: 2025-04-11
Payer: COMMERCIAL

## 2025-04-11 ENCOUNTER — TELEPHONE (OUTPATIENT)
Dept: INTERNAL MEDICINE | Facility: CLINIC | Age: 45
End: 2025-04-11
Payer: COMMERCIAL

## 2025-04-11 DIAGNOSIS — F31.81 BIPOLAR II DISORDER: ICD-10-CM

## 2025-04-11 DIAGNOSIS — F90.2 ATTENTION DEFICIT HYPERACTIVITY DISORDER (ADHD), COMBINED TYPE: Primary | ICD-10-CM

## 2025-04-11 DIAGNOSIS — F41.1 GENERALIZED ANXIETY DISORDER: ICD-10-CM

## 2025-04-11 NOTE — PROGRESS NOTES
"Outpatient Psychiatry Short Term Therapy Visit    Clinical Status of Patient: Outpatient (Ambulatory)  04/11/2025     Chief Complaint:    1) mood problems  2)  interpersonal issues     Interval History and Content of Current Session:  Interim Events/Subjective Report/Content of Current Session:  This session was session #10    The session started with a review of therapy goal(s).       Risk Parameters:  Patient reports no suicidal ideation  Patient reports no homicidal ideation  Patient reports no self-injurious behavior  Patient reports no violent behavior    Assessment and Diagnosis   Status/Progress: She feels "exhausted" working on herself while others in her family are not working on themselves.   She has the thought she would like to be away from people for two months because she is exhausted. Taught her ACT and FFMe to help her relax her nervous system. As we talked it became more clear why she was exhausted. She was overthinking and her girlfriend was being traumatized by a coworker in her presence. Discussion of boundaries, deep listening, and protecting herself during trauma. She understood it. Will practice more boundaries. She asked for more clarification here.      Impression: Pt presents with concerns related to X. Will continue short-term therapy focused on patient's concerns.         Intervention/Counseling/Treatment Plan     Diagnosis:   Bipolar II disorder   Generalized anxiety disorder  Attention deficit hyperactivity disorder (ADHD)      1. Continue with Short-term therapy with this author     2. Call to report any worsening of symptoms or problems with the medication. Pt instructed to go to ER with thoughts of harming self, others    Psychotherapy:   Target symptom(s):   Why chosen therapy is appropriate versus another modality: CBT used; relevant to diagnosis, patient responds to this modality  Outcome monitoring methods: self-report, observation  Therapeutic intervention type: Cognitive " Behavioral Therapy  Topics discussed/themes: building skills sets for symptom management, symptom recognition, nutrition, exercise  The patient's response to the intervention is good  Patient's response to treatment is: good   The patient's progress toward treatment goals: improving     Return to clinic:   1 week    -Cognitive-Behavioral/Supportive therapy and psychoeducation provided  -Pt instructed to call clinic, 911 or go to nearest emergency room if sxs worsen or pt is in   crisis. The pt expresses understanding.    Prior to scheduling therapy and initiation of first therapy session, the potential drawbacks and risks of short-term therapy were discussed with patient. These include, but are not limited to: termination of therapy when patient believes they may continue to benefit and having to build a new relationship with a new therapist should the patient decide to continue psychotherapy. Pt expressed understanding and agreement to short-term therapy with a maximum of 8 sessions with this author.

## 2025-04-11 NOTE — TELEPHONE ENCOUNTER
----- Message from Oc De La Rosa sent at 4/11/2025  8:05 AM CDT -----  Regarding: Wegovy  Hello,Pt has not been in contact with [facility] for 2 months for medication refills. Per protocol, prescriptions will be discontinued and enrollment with [facility] will be closed out. Last dispense date was on 1/3/25 for Wegovy 1mg.

## 2025-04-11 NOTE — TELEPHONE ENCOUNTER
Patient has been closed out of [facility] program due to [facility] being unable to reach patient.  Programs updated, medications discontinued, and future visits canceled. Pt to welcome to re-enroll in  MTM program at later date if they would like.

## 2025-04-15 ENCOUNTER — OFFICE VISIT (OUTPATIENT)
Dept: PSYCHIATRY | Facility: CLINIC | Age: 45
End: 2025-04-15
Payer: COMMERCIAL

## 2025-04-15 DIAGNOSIS — F90.2 ATTENTION DEFICIT HYPERACTIVITY DISORDER (ADHD), COMBINED TYPE: ICD-10-CM

## 2025-04-15 DIAGNOSIS — F41.1 GENERALIZED ANXIETY DISORDER: ICD-10-CM

## 2025-04-15 DIAGNOSIS — F31.81 BIPOLAR II DISORDER: Primary | ICD-10-CM

## 2025-04-15 PROCEDURE — 1159F MED LIST DOCD IN RCRD: CPT | Mod: CPTII,95,, | Performed by: PSYCHOLOGIST

## 2025-04-15 PROCEDURE — 90837 PSYTX W PT 60 MINUTES: CPT | Mod: 95,,, | Performed by: PSYCHOLOGIST

## 2025-04-15 NOTE — PROGRESS NOTES
Outpatient Psychiatry Short Term Therapy Visit    Clinical Status of Patient: Outpatient (Ambulatory)  04/15/2025     Chief Complaint:    1) Emotional Exhaustion  2)  Interpersonal Problems     Interval History and Content of Current Session:  Interim Events/Subjective Report/Content of Current Session:  This session was session #    The session started with a review of therapy goal(s).       Risk Parameters:  Patient reports no suicidal ideation  Patient reports no homicidal ideation  Patient reports no self-injurious behavior  Patient reports no violent behavior    Assessment and Diagnosis   Status/Progress: She received the handout on FFMe. We are working on lowering her emotional reactions in her relationships. She is practicing the steps of going from reaction to intention. We worked through issues with the family. She processed her stressors. Used CBT to analyze a situation where she got angry.   Taught her CBT skills and we practiced in session. Reviewed the FFMe handout. She is practicing noticing when she is triggered and relaxing her nervous system.      Impression: Pt presents with concerns related to interpersonal problems and mood. Will continue short-term therapy focused on patient's concerns.    Diagnosis:   Bipolar II disorder  DALTON  ADHD    Intervention/Counseling/Treatment Plan     1. Continue with Short-term therapy with this author     2. Call to report any worsening of symptoms or problems with the medication. Pt instructed to go to ER with thoughts of harming self, others    Psychotherapy:   Target symptom(s):   Why chosen therapy is appropriate versus another modality: CBT used; relevant to diagnosis, patient responds to this modality  Outcome monitoring methods: self-report, observation  Therapeutic intervention type: Cognitive Behavioral Therapy  Topics discussed/themes: building skills sets for symptom management, symptom recognition, nutrition, exercise  The patient's response to the  intervention is good  Patient's response to treatment is: good   The patient's progress toward treatment goals: improving     Return to clinic:   1 week     -Cognitive-Behavioral/Supportive therapy and psychoeducation provided  -Pt instructed to call clinic, 911 or go to nearest emergency room if sxs worsen or pt is in   crisis. The pt expresses understanding.    Prior to scheduling therapy and initiation of first therapy session, the potential drawbacks and risks of short-term therapy were discussed with patient. These include, but are not limited to: termination of therapy when patient believes they may continue to benefit and having to build a new relationship with a new therapist should the patient decide to continue psychotherapy.

## 2025-04-22 ENCOUNTER — OFFICE VISIT (OUTPATIENT)
Dept: PSYCHIATRY | Facility: CLINIC | Age: 45
End: 2025-04-22
Payer: COMMERCIAL

## 2025-04-22 DIAGNOSIS — F90.2 ATTENTION DEFICIT HYPERACTIVITY DISORDER (ADHD), COMBINED TYPE: Primary | ICD-10-CM

## 2025-04-22 DIAGNOSIS — F31.81 BIPOLAR II DISORDER: ICD-10-CM

## 2025-04-22 DIAGNOSIS — F41.1 GENERALIZED ANXIETY DISORDER: ICD-10-CM

## 2025-04-22 NOTE — PROGRESS NOTES
"Outpatient Psychiatry Short Term Therapy Visit    Clinical Status of Patient: Outpatient (Ambulatory)  04/22/2025     Chief Complaint:    1) Shame  2)  Feeling her own feelings     Interval History and Content of Current Session:  Interim Events/Subjective Report/Content of Current Session:  This session was session #    The session started with a review of therapy goal(s).       Risk Parameters:  Patient reports no suicidal ideation  Patient reports no homicidal ideation  Patient reports no self-injurious behavior  Patient reports no violent behavior    Assessment and Diagnosis   Status/Progress: She had a meeting at work where she did not do as well as she would like. She had overwhelming feelings of shame. Helped her process this experience. Psychoeducation on what a shame attack is and how to work it through using PIT trauma therapy. Psychoeducation on how to feel "clean pain" versus stuck in distorted thinking and feeling the effects of these thoughts.   She reported she feels more calm now as she applying the skills she is learning in therapy. She is starting to live into "there is nothing wrong with me." She feels more hopeful. Gave her credit.  Spent the next part of the session working through her three way relationship. She is doing well with it. Used the five secrets of effective communication to help her gain insight into the nature of her thoughts, feelings, and behaviors. She is more calm in this relationship for "which there is no blue print."      Impression: Pt presents with concerns related to her mood and interpersonal relationships. Will continue short-term therapy focused on patient's concerns.    Diagnosis:   Bipolar II disorder   Generalized anxiety disorder   Attention deficit hyperactivity disorder (ADHD)     Intervention/Counseling/Treatment Plan     1. Continue with Short-term therapy with this author     2. Call to report any worsening of symptoms or problems with the medication. Pt " instructed to go to ER with thoughts of harming self, others    Psychotherapy:   Target symptom(s):   Why chosen therapy is appropriate versus another modality: CBT used; relevant to diagnosis, patient responds to this modality  Outcome monitoring methods: self-report, observation  Therapeutic intervention type: Cognitive Behavioral Therapy  Topics discussed/themes: building skills sets for symptom management, symptom recognition  The patient's response to the intervention is good  Patient's response to treatment is: good   The patient's progress toward treatment goals: improving     Return to clinic:   Weekly therapy    -Cognitive-Behavioral/Supportive therapy and psychoeducation provided  -Pt instructed to call clinic, 911 or go to nearest emergency room if sxs worsen or pt is in   crisis. The pt expresses understanding.    Prior to scheduling therapy and initiation of first therapy session, the potential drawbacks and risks of short-term therapy were discussed with patient. These include, but are not limited to: termination of therapy when patient believes they may continue to benefit and having to build a new relationship with a new therapist should the patient decide to continue psychotherapy.

## 2025-04-25 ENCOUNTER — OFFICE VISIT (OUTPATIENT)
Dept: PSYCHIATRY | Facility: CLINIC | Age: 45
End: 2025-04-25
Payer: COMMERCIAL

## 2025-04-25 DIAGNOSIS — F90.9 ATTENTION DEFICIT HYPERACTIVITY DISORDER (ADHD), UNSPECIFIED ADHD TYPE: ICD-10-CM

## 2025-04-25 DIAGNOSIS — F41.1 GENERALIZED ANXIETY DISORDER: ICD-10-CM

## 2025-04-25 DIAGNOSIS — F31.81 BIPOLAR II DISORDER: Primary | ICD-10-CM

## 2025-04-25 NOTE — PROGRESS NOTES
The patient location is: at home in [place].   The chief complaint leading to consultation is: mood problems and interpersonal issues.     Visit type: audiovisual    Face to Face time with patient: 52  60 minutes of total time spent on the encounter, which includes face to face time and non-face to face time preparing to see the patient (eg, review of tests), Obtaining and/or reviewing separately obtained history, Documenting clinical information in the electronic or other health record, Independently interpreting results (not separately reported) and communicating results to the patient/family/caregiver, or Care coordination (not separately reported).       Each patient to whom he or she provides medical services by telemedicine is:  (1) informed of the relationship between the physician and patient and the respective role of any other health care provider with respect to management of the patient; and (2) notified that he or she may decline to receive medical services by telemedicine and may withdraw from such care at any time.    Notes:   Outpatient Psychiatry Short Term Therapy Visit    Clinical Status of Patient: Outpatient (Ambulatory)  04/25/2025     Chief Complaint:    1) Interpersonal relationships  2)  Learning the FFMe process     Interval History and Content of Current Session:  Interim Events/Subjective Report/Content of Current Session:      The session started with a review of therapy goal(s).      Risk Parameters:  Patient reports no suicidal ideation  Patient reports no homicidal ideation  Patient reports no self-injurious behavior  Patient reports no violent behavior    Assessment and Diagnosis   Status/Progress: She wanted to work on learning the FFMe process. She is learning how to deactiviate her stress response when triggered. She talked about getting triggered with her gf and tried to practice the wet noodle. We examined her trigger and looked at it more deeply. She was triggered because  "of jealously. She gets emotionally overwhelmed when triggered. Today used parts work and CBT to help her deal with emotional overwhelm. She described the feeling as a "rush." She is trying to learn what to do. After our discussion, she had more clarity on emotional overwhelm. She decided she wanted to try to be more vulnerable with her gf and talk to her about her trigger in a moderate way. We practiced this communication in session. She is afraid to be vulnerable as she feels she is being demanding. Reframed this thought using CBT.   Next session she wants "to discuss lesbian celibacy life I lead."    Impression: Pt presents with concerns related to her mood and interpersonal relationships. Will continue short-term therapy focused on patient's concerns.    Diagnosis:   Bipolar II disorder   Generalized anxiety disorder   Attention deficit hyperactivity disorder (ADHD)     Intervention/Counseling/Treatment Plan     1. Continue with Short-term therapy with this author     2. Call to report any worsening of symptoms or problems with the medication. Pt instructed to go to ER with thoughts of harming self, others    Psychotherapy:   Target symptom(s):   Why chosen therapy is appropriate versus another modality: CBT used; relevant to diagnosis, patient responds to this modality  Outcome monitoring methods: self-report, observation  Therapeutic intervention type: Cognitive Behavioral Therapy  Topics discussed/themes: building skills sets for symptom management, symptom recognition  The patient's response to the intervention is good  Patient's response to treatment is: good   The patient's progress toward treatment goals: improving     Return to clinic:   Weekly therapy    -Cognitive-Behavioral/Supportive therapy and psychoeducation provided  -Pt instructed to call clinic, 911 or go to nearest emergency room if sxs worsen or pt is in   crisis. The pt expresses understanding.    Prior to scheduling therapy and initiation of " first therapy session, the potential drawbacks and risks of short-term therapy were discussed with patient. These include, but are not limited to: termination of therapy when patient believes they may continue to benefit and having to build a new relationship with a new therapist should the patient decide to continue psychotherapy.

## 2025-04-29 ENCOUNTER — OFFICE VISIT (OUTPATIENT)
Dept: PSYCHIATRY | Facility: CLINIC | Age: 45
End: 2025-04-29
Payer: COMMERCIAL

## 2025-04-29 DIAGNOSIS — F90.2 ATTENTION DEFICIT HYPERACTIVITY DISORDER (ADHD), COMBINED TYPE: Primary | ICD-10-CM

## 2025-04-29 DIAGNOSIS — F41.1 GENERALIZED ANXIETY DISORDER: ICD-10-CM

## 2025-04-29 DIAGNOSIS — F31.81 BIPOLAR II DISORDER: ICD-10-CM

## 2025-04-29 NOTE — PROGRESS NOTES
Outpatient Psychiatry Short Term Therapy Visit    Clinical Status of Patient: Outpatient (Ambulatory)  04/29/2025     Chief Complaint:    1) Bipolar disorder  2)  Interpersonal issues     Interval History and Content of Current Session:  Interim Events/Subjective Report/Content of Current Session:  This session was session #13    The session started with a review of therapy goal(s).       Risk Parameters:  Patient reports no suicidal ideation  Patient reports no homicidal ideation  Patient reports no self-injurious behavior  Patient reports no violent behavior    Assessment and Diagnosis   Status/Progress: She reported that she tried to schedule an appointment with this therapist but was told she could not do virtual on Tuesday or Friday. This therapist informed her we could schedule the virtual appointments for her.   She reported she is embodying the tools she is learning in therapy. She is more calm. She is no longer having angry outbursts. She is able to  herself to a more calm state. Gave her credit.   Today she discussed adding more intimacy to her relationship. Allowed her to share her thoughts and feelings and behavior change ideas. She is insecure in this area. Reframed having the talk about more intimacy as new and fresh and therefore it can be scary but also new experiences may arise. She liked the reframe.      Impression: Pt presents with concerns related to anxiety and interpersonal problems. Will continue short-term therapy focused on patient's concerns.    Diagnosis:   Problem List Items Addressed This Visit       Bipolar II disorder    Generalized anxiety disorder    Attention deficit hyperactivity disorder (ADHD) - Primary      Intervention/Counseling/Treatment Plan     1. Continue with Short-term therapy with this author     2. Call to report any worsening of symptoms or problems with the medication. Pt instructed to go to ER with thoughts of harming self, others    Psychotherapy:   Target  symptom(s):   Why chosen therapy is appropriate versus another modality: CBT used; relevant to diagnosis, patient responds to this modality  Outcome monitoring methods: self-report, observation  Therapeutic intervention type: Cognitive Behavioral Therapy  Topics discussed/themes: building skills sets for symptom management, symptom recognition, nutrition, exercise  The patient's response to the intervention is good  Patient's response to treatment is: good   The patient's progress toward treatment goals: improving     Return to clinic:   1 week    -Cognitive-Behavioral/Supportive therapy and psychoeducation provided  -Pt instructed to call clinic, 911 or go to nearest emergency room if sxs worsen or pt is in   crisis. The pt expresses understanding.    Prior to scheduling therapy and initiation of first therapy session, the potential drawbacks and risks of short-term therapy were discussed with patient. These include, but are not limited to: termination of therapy when patient believes they may continue to benefit and having to build a new relationship with a new therapist should the patient decide to continue psychotherapy.

## 2025-05-02 ENCOUNTER — OFFICE VISIT (OUTPATIENT)
Dept: PSYCHIATRY | Facility: CLINIC | Age: 45
End: 2025-05-02
Payer: COMMERCIAL

## 2025-05-02 DIAGNOSIS — F31.81 BIPOLAR II DISORDER: Primary | ICD-10-CM

## 2025-05-02 DIAGNOSIS — F90.0 ATTENTION DEFICIT HYPERACTIVITY DISORDER (ADHD), PREDOMINANTLY INATTENTIVE TYPE: ICD-10-CM

## 2025-05-02 DIAGNOSIS — F41.1 GENERALIZED ANXIETY DISORDER: ICD-10-CM

## 2025-05-02 NOTE — PROGRESS NOTES
"  Outpatient Psychiatry Telemedicine Therapy Visit  [facility] PSYCHIATRY  [facility], [place]   05/02/2025  Veronique Louise        History of Presenting Illness:      Pt is a Not  or /a 44 y.o. female with a history of bipolar disorder and interpersonal problems. Patient was seen, examined and chart was reviewed. Patient reviewed and agreed to informed consent and limits of confidentiality.    The patient location is: [place]. Pt is at home.   The chief complaint leading to consultation is:      Visit type: audiovisual    Face to Face time with patient: 55  60 minutes of total time spent on the encounter, which includes face to face time and non-face to face time preparing to see the patient (eg, review of tests), Obtaining and/or reviewing separately obtained history, Documenting clinical information in the electronic or other health record, Independently interpreting results (not separately reported) and communicating results to the patient/family/caregiver, or Care coordination (not separately reported).     The session started with a review of therapy goal(s).     Interval History and Content of Current Session:  Content of Current Session:  Last session date: 4/29/2025     Problem(s) Discussed:  We had a conversation about appointments and frequency. She would like two times a week. Explained that due to insurance rules and practice rules we are only able to meet one time per week on Wednesday, which is this therapist's virtual day.   She is learning how to control her emotions. "The old me would have gotten huffy and angry."   She read her notes. We discussed issues related to work and her diet. She is able to live in a more calm mind and body. She is coaching herself. She feels better.     Symptoms/Impairment:   Irritability, anxiety, feeling unsure of herself    In-session Interventions:  CBT    Prognosis:   [x]  Prognosis favorable based on client's " progress/motivation to participate   []  Other:       Medical Necessity for 71501 sessions, if applicable: (Longer session justified and medically necessary due to:)  []  Use of technique that take longer to implement (e.g. EMDR techniques/Systematic Desensitization/ DBT Protocol)  [x]  Severity of symptoms  [x]  More session time needed to process trauma-related emotions  []  More time needed to create and monitor relapse prevention protocols  [x]  Client has dual diagnosis -- requires extra time to address both diagnoses   [x]  Need to manage high-conflict interactions of couple/family  []  Extra time needed to monitor crisis issues and safety planning  []  Client has extreme difficulty identifying [emotions/motivations], requires more session time to process and gain insights   []  Not applicable  []  Other:        Risk Parameters:  Patient reports no suicidal ideation  Patient reports no homicidal ideation  Patient reports no self-injurious behavior  Patient reports no violent behavior    Diagnostic Impressions:  Diagnosis(es):   Bipolar II disorder   Generalized anxiety disorder   Attention deficit hyperactivity disorder (ADHD)     Treatment Plan:  1. Continue with Short-term therapy with this author     2. Pt instructed to call clinic, 911 or go to nearest emergency room if sxs worsen or pt is in crisis or suicidal. The pt expresses understanding.    Psychotherapy:   Target symptom(s):   Why chosen therapy is appropriate versus another modality: CBT used; relevant to diagnosis, patient responds to this modality  Outcome monitoring methods: self-report, observation  Therapeutic intervention type: problem solving. Practicing CBT skills. Psychoeducation.   Topics discussed/themes: building skills sets for symptom management and symptom recognition  The patient's response to the intervention and treatment is: excellent  The patient's progress toward treatment goals: excellent     Return to clinic:   1  robert Zambrano PsyD     Each patient to whom he or she provides medical services by telemedicine is:  (1) informed of the relationship between the physician and patient and the respective role of any other health care provider with respect to management of the patient; and (2) notified that he or she may decline to receive medical services by telemedicine and may withdraw from such care at any time.

## 2025-05-08 ENCOUNTER — OFFICE VISIT (OUTPATIENT)
Dept: PSYCHIATRY | Facility: CLINIC | Age: 45
End: 2025-05-08
Payer: COMMERCIAL

## 2025-05-08 DIAGNOSIS — F90.0 ATTENTION DEFICIT HYPERACTIVITY DISORDER (ADHD), PREDOMINANTLY INATTENTIVE TYPE: ICD-10-CM

## 2025-05-08 DIAGNOSIS — F31.81 BIPOLAR II DISORDER: Primary | ICD-10-CM

## 2025-05-08 DIAGNOSIS — F41.1 GENERALIZED ANXIETY DISORDER: ICD-10-CM

## 2025-05-08 PROCEDURE — 90837 PSYTX W PT 60 MINUTES: CPT | Mod: 95,,, | Performed by: PSYCHOLOGIST

## 2025-05-08 PROCEDURE — 1159F MED LIST DOCD IN RCRD: CPT | Mod: CPTII,95,, | Performed by: PSYCHOLOGIST

## 2025-05-08 NOTE — PROGRESS NOTES
Outpatient Psychiatry Telemedicine Therapy Visit  [facility] PSYCHIATRY  [facility], [place]   05/08/2025  Veronique Maguirejessee Louise        History of Presenting Illness:      Pt is a Not  or /a 44 y.o.  Patient was seen, examined and chart was reviewed. Patient reviewed and agreed to informed consent and limits of confidentiality.    The patient location is: [place], she is at home.   The chief complaint leading to consultation are: mood problems and interpersonal problems     Visit type: audiovisual    Face to Face time with patient: 55  60 minutes of total time spent on the encounter, which includes face to face time and non-face to face time preparing to see the patient (eg, review of tests), Obtaining and/or reviewing separately obtained history, Documenting clinical information in the electronic or other health record, Independently interpreting results (not separately reported) and communicating results to the patient/family/caregiver, or Care coordination (not separately reported).     The session started with a review of therapy goal(s).     Interval History and Content of Current Session:  Content of Current Session:  Last session date: 5/2/2025     Problem(s) Discussed:  Interpersonal problems with her best gf. She had notes to discuss this week. She is trying to apply the skills learned in therapy. Allowed her to discuss her thoughts and feelings about her gf. Normalized her concerns and used CBT to change irrational thoughts to rational thoughts. She shared her thoughts about her daughter changing schools. She wants to save more money. She shared her dreams of buying a rental property. She is wondering if she wants to leave her  in the future.     Symptoms/Impairment:   Anxiety, irritability, stress    In-session Interventions:  CBT, changed thoughts in session, practiced the FFMe techniques to deactive the stress response when triggered.      Prognosis:   [x]  Prognosis favorable based on client's progress/motivation to participate   []  Other:       Medical Necessity for 70710 sessions, if applicable: (Longer session justified and medically necessary due to:)  []  Use of technique that take longer to implement (e.g. EMDR techniques/Systematic Desensitization/ DBT Protocol)  [x]  Severity of symptoms  []  More session time needed to process trauma-related emotions  []  More time needed to create and monitor relapse prevention protocols  [x]  Client has dual diagnosis -- requires extra time to address both diagnoses   [x]  Need to manage high-conflict interactions of couple/family  []  Extra time needed to monitor crisis issues and safety planning  []  Client has extreme difficulty identifying [emotions/motivations], requires more session time to process and gain insights   []  Not applicable  []  Other:        Risk Parameters:  Patient reports no suicidal ideation  Patient reports no homicidal ideation  Patient reports no self-injurious behavior  Patient reports no violent behavior    Diagnostic Impressions:  Diagnosis(es):   Bipolar II disorder  DALTON  ADHD    Treatment Plan:  1. Continue with Short-term therapy with this author     2. Pt instructed to call clinic, 911 or go to nearest emergency room if sxs worsen or pt is in crisis or suicidal. The pt expresses understanding.    Psychotherapy:   Target symptom(s):   Why chosen therapy is appropriate versus another modality: CBT used; relevant to diagnosis, patient responds to this modality  Outcome monitoring methods: self-report, observation  Therapeutic intervention type: CBT and FFMe  Topics discussed/themes: building skills sets for symptom management and symptom recognition  The patient's response to the intervention and treatment is: good  The patient's progress toward treatment goals: excellent     Return to clinic:   1 week    Charly Zambrano PsyD     Each patient to whom he or she provides  medical services by telemedicine is:  (1) informed of the relationship between the physician and patient and the respective role of any other health care provider with respect to management of the patient; and (2) notified that he or she may decline to receive medical services by telemedicine and may withdraw from such care at any time.

## 2025-05-11 ENCOUNTER — ON-DEMAND VIRTUAL (OUTPATIENT)
Dept: URGENT CARE | Facility: CLINIC | Age: 45
End: 2025-05-11
Payer: COMMERCIAL

## 2025-05-11 DIAGNOSIS — M54.50 ACUTE LOW BACK PAIN WITHOUT SCIATICA, UNSPECIFIED BACK PAIN LATERALITY: Primary | ICD-10-CM

## 2025-05-11 PROCEDURE — 98005 SYNCH AUDIO-VIDEO EST LOW 20: CPT | Mod: CC,95,, | Performed by: NURSE PRACTITIONER

## 2025-05-11 RX ORDER — NAPROXEN 500 MG/1
500 TABLET ORAL 2 TIMES DAILY PRN
Qty: 20 TABLET | Refills: 0 | Status: SHIPPED | OUTPATIENT
Start: 2025-05-11 | End: 2025-05-21

## 2025-05-11 RX ORDER — CYCLOBENZAPRINE HCL 10 MG
10 TABLET ORAL 3 TIMES DAILY PRN
Qty: 30 TABLET | Refills: 0 | Status: SHIPPED | OUTPATIENT
Start: 2025-05-11 | End: 2025-05-21

## 2025-05-11 NOTE — PATIENT INSTRUCTIONS

## 2025-05-14 ENCOUNTER — OFFICE VISIT (OUTPATIENT)
Dept: PSYCHIATRY | Facility: CLINIC | Age: 45
End: 2025-05-14
Payer: COMMERCIAL

## 2025-05-14 DIAGNOSIS — F31.81 BIPOLAR II DISORDER: Primary | ICD-10-CM

## 2025-05-14 DIAGNOSIS — F90.0 ATTENTION DEFICIT HYPERACTIVITY DISORDER (ADHD), PREDOMINANTLY INATTENTIVE TYPE: ICD-10-CM

## 2025-05-14 DIAGNOSIS — F41.1 GENERALIZED ANXIETY DISORDER: ICD-10-CM

## 2025-05-15 NOTE — PROGRESS NOTES
"  Outpatient Psychiatry Therapy Visit  [facility] PSYCHIATRY  [facility], [place]   05/15/2025  Veronique Louise        History of Presenting Illness:      Pt presents for a follow up visit. Patient was seen, examined and chart was reviewed. Patient reviewed and agreed to informed consent and limits of confidentiality.    History of Present Illness    CHIEF COMPLAINT:  Patient presents for a therapy session to discuss ongoing challenges with caring for her partner and managing her own emotional well-being.    HPI:  Patient is experiencing stress and frustration from caring for her partner, who is in pain and recovering from a medical procedure. She reports feeling exhausted and overwhelmed by the responsibilities of caregiving, which include managing her partner's emotional needs and physical care. Patient describes a recent argument, highlighting the strain in their relationship due to the caregiving situation.    Patient discusses her struggles with emotional eating, particularly in response to stress. She mentions a recent experience where she was tempted to eat cake for comfort but paused to reflect on the underlying emotions per our last session. Patient has a history of significant weight loss (110 lbs) but reports experiencing a slow weight gain over time. She expresses a desire to manage her sugar intake and improve her eating habits, recognizing sugar as a "stronghold" in her life.    Patient describes a complex dynamic with her partner, who frequently expresses feelings of loneliness. This triggers feelings of guilt and inadequacy in the patient, as she struggles to balance her caregiving responsibilities with her own life. She reports visiting her partner 4-6 days a week but still feels that it's not enough to alleviate her partner's loneliness.    Patient demonstrates insight into her emotional patterns and is actively working on self-improvement. She discusses her " efforts to understand the connection between her eating habits and emotional needs, as well as her attempts to set boundaries in her caregiving role. Patient expresses a desire to change her reactive patterns in response to her partner's expressions of loneliness.    At 45 years old, the patient is thinking about her long-term health and aging process. She mentions enjoying physical activities like weightlifting and expresses concern about maintaining her strength and mobility as she ages. This concern is partly driving her motivation to address her eating habits and overall health.      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 She currently splits her time between two households, suggesting a unique living situation. Her hobbies and interests include lifting heavy weights at the gym and reading Reddit. Patient also mentions occasional use of marijuana joints.      ROS:  Psychiatric: +anxiety, -depression, -sleep difficulty, +irritability        Prognosis:   [x]  Prognosis favorable based on client's progress/motivation to participate   []  Other:       Medical Necessity for 22621 sessions, if applicable: (Longer session justified and medically necessary due to:)  []  Use of technique that take longer to implement (e.g. EMDR techniques/Systematic Desensitization/ DBT Protocol)  [x]  Severity of symptoms  [x]  More session time needed to process trauma-related emotions  []  More time needed to create and monitor relapse prevention protocols  [x]  Client has dual diagnosis -- requires extra time to address both diagnoses   []  Need to manage high-conflict interactions of couple/family  []  Extra time needed to monitor crisis issues and safety planning  []  Client has extreme difficulty identifying [emotions/motivations], requires more session time to process and gain insights   []  Not applicable  []  Other: Chronicity and severity of  her diagnoses       Risk Parameters:  Patient reports no suicidal ideation  Patient reports no homicidal ideation  Patient reports no self-injurious behavior  Patient reports no violent behavior    Diagnostic Impressions:  Diagnosis(es):   Bipolar II disorder  ADHD  DALTON    Treatment Plan:  1. Continue with Short-term therapy with this author     Assessment & Plan    IMPRESSION:  - Identified emotional eating patterns and sugar cravings as potential areas for intervention.  - Assessed approach to managing caregiver stress and frustration.  - Evaluated communication patterns with partner, particularly around expressions of loneliness.    ANXIETY AND PHOBIC DISORDERS:  - Explained Emotional Freedom Technique (EFT) tapping as a potential tool for managing acute anxiety and emotional overwhelm.   - Patient to research tapping technique further if interested.    SUBSTANCE USE DISORDER:  - Explained Emotional Freedom Technique (EFT) tapping as a potential tool for managing cravings.    RELATIONSHIP PROBLEMS:  - Discussed using talking and listening boundaries in communication to avoid internalizing blame and maintain emotional separation.  - Recommend practicing not internalizing or accepting blame when partner expresses feeling alone.  - Patient to maintain listening boundaries and allow partner to express feelings without defending or arguing.    FOLLOW-UP:  - Will explore possibility of scheduling future appointments on days other than Wednesdays.          2. Pt instructed to call clinic, 911 or go to nearest emergency room if sxs worsen or pt is in crisis or suicidal. The pt expresses understanding.    Psychotherapy:   Target symptom(s):   Why chosen therapy is appropriate versus another modality: CBT used; relevant to diagnosis, patient responds to this modality  Outcome monitoring methods: self-report, observation  Therapeutic intervention type: Trauma informed and ACT  Topics discussed/themes: building skills sets  for symptom management and symptom recognition  The patient's response to the intervention and treatment is: excellent  The patient's progress toward treatment goals: excellent     Return to clinic:   Weekly therapy    Charly Zambrano PsyD     This note was generated with the assistance of ambient listening technology. Verbal consent was obtained by the patient and accompanying visitor(s) for the recording of patient appointment to facilitate this note. I attest to having reviewed and edited the generated note for accuracy, though some syntax or spelling errors may persist. Please contact the author of this note for any clarification.

## 2025-05-18 ENCOUNTER — OFFICE VISIT (OUTPATIENT)
Dept: URGENT CARE | Facility: CLINIC | Age: 45
End: 2025-05-18
Payer: COMMERCIAL

## 2025-05-18 VITALS
HEART RATE: 66 BPM | HEIGHT: 64 IN | TEMPERATURE: 98 F | RESPIRATION RATE: 20 BRPM | BODY MASS INDEX: 33.29 KG/M2 | DIASTOLIC BLOOD PRESSURE: 86 MMHG | SYSTOLIC BLOOD PRESSURE: 136 MMHG | OXYGEN SATURATION: 99 % | WEIGHT: 195 LBS

## 2025-05-18 DIAGNOSIS — S61.209A AVULSION, FINGER TIP, INITIAL ENCOUNTER: Primary | ICD-10-CM

## 2025-05-18 PROCEDURE — 99213 OFFICE O/P EST LOW 20 MIN: CPT | Mod: S$GLB,,, | Performed by: NURSE PRACTITIONER

## 2025-05-18 RX ORDER — CEPHALEXIN 500 MG/1
500 CAPSULE ORAL EVERY 12 HOURS
Qty: 14 CAPSULE | Refills: 0 | Status: SHIPPED | OUTPATIENT
Start: 2025-05-18 | End: 2025-05-25

## 2025-05-18 RX ORDER — MUPIROCIN 20 MG/G
OINTMENT TOPICAL 3 TIMES DAILY
Qty: 30 G | Refills: 0 | Status: SHIPPED | OUTPATIENT
Start: 2025-05-18 | End: 2025-05-28

## 2025-05-18 NOTE — PATIENT INSTRUCTIONS
Avulsion, finger tip, initial encounter    -     mupirocin (BACTROBAN) 2 % ointment; Apply topically 3 (three) times daily. for 10 days  Dispense: 30 g; Refill: 0    -     cephALEXin (KEFLEX) 500 MG capsule; Take 1 capsule (500 mg total) by mouth every 12 (twelve) hours. for 7 days  Dispense: 14 capsule; Refill: 0     - finger splint applied for protection    Wound Care    Keep your dressing on for at least 24 hours.     Gently clean wound with soap and water twice daily.    After cleaning and patting dry, apply antibiotic ointment to the wound site with a Q-tip.    Do not let thick, dark, adherent crust form since this may delay healing.  If a crust develops, use Q-tip and hydrogen peroxide diluted 1 to 1 with tap water to gently break it up.  Never pour/soak wound in hydrogen peroxide.    Cover with non-stick dressing (like Band-Aid)    Keep wound covered until well-healed.      Signs of Infection:  Increase in redness (In initial stages, redness is normal at the wound edge but should not continue to increase)  Increase in pain and swelling (Similar to redness, pain and swelling is a normal part of healing, but should not continue or worsen after 3-5 days)  Yellowish drainage or fever after a few days    Itching is normal part of the healing process.  If severe or redness and water blisters develop, you may be allergic to ointment or tape.      Scar Care (after wound is completely healed):   * If Keloid history or suspected see Dermatology ASAP  Use sunscreen if sun exposure to decrease scarring  Once healed, use silicone sheeting nightly for 1-2 months

## 2025-05-18 NOTE — PROGRESS NOTES
"Subjective:      Patient ID: Veronique Louise is a 45 y.o. female.    Vitals:  height is 5' 4" (1.626 m) and weight is 88.5 kg (195 lb). Her oral temperature is 98 °F (36.7 °C). Her blood pressure is 136/86 and her pulse is 66. Her respiration is 20 and oxygen saturation is 99%.     Chief Complaint: Hand Injury    Patient is a 44 yo female presenting with left hand third digit injury. Patient states she cut it while cooking. Onset around 9:30pm last night. Pt reports using OTC neosporin and gauze with tape for dressing. It started bleeding again this morning but has not started bleeding again since they put this dressing on a few hours ago.    Hand Injury   Her dominant hand is their left hand. The incident occurred 6 to 12 hours ago. The incident occurred at home. The injury mechanism was a direct blow. The pain is present in the left hand. The quality of the pain is described as aching. The pain does not radiate. The pain is at a severity of 7/10. The pain is moderate. The pain has been Constant since the incident. Pertinent negatives include no chest pain. The treatment provided no relief.       Constitution: Negative for chills, fatigue and fever.   Cardiovascular:  Negative for chest pain, leg swelling, palpitations and sob on exertion.   Respiratory:  Negative for chest tightness and shortness of breath.    Gastrointestinal:  Negative for nausea, vomiting and diarrhea.   Skin:  Positive for wound (left distal finger tip avulsion - covered in dressing - pt opted to keep dressing on instead of risking it bleeding again.).   Neurological:  Negative for headaches.      Objective:     Physical Exam   Constitutional: She is oriented to person, place, and time.   Cardiovascular: Normal rate and regular rhythm.   Pulmonary/Chest: Effort normal and breath sounds normal.   Musculoskeletal:      Left hand: Left middle finger: Injuries: laceration (avulsion to distal tip - not involoving the nail.).   Neurological: " She is alert and oriented to person, place, and time.   Skin: Skin is warm and dry.   Vitals reviewed.      Assessment:     1. Avulsion, finger tip, initial encounter        Plan:       Avulsion, finger tip, initial encounter  -     mupirocin (BACTROBAN) 2 % ointment; Apply topically 3 (three) times daily. for 10 days  Dispense: 30 g; Refill: 0  -     cephALEXin (KEFLEX) 500 MG capsule; Take 1 capsule (500 mg total) by mouth every 12 (twelve) hours. for 7 days  Dispense: 14 capsule; Refill: 0      Patient Instructions   Avulsion, finger tip, initial encounter    -     mupirocin (BACTROBAN) 2 % ointment; Apply topically 3 (three) times daily. for 10 days  Dispense: 30 g; Refill: 0    -     cephALEXin (KEFLEX) 500 MG capsule; Take 1 capsule (500 mg total) by mouth every 12 (twelve) hours. for 7 days  Dispense: 14 capsule; Refill: 0     - finger splint applied for protection    Wound Care    Keep your dressing on for at least 24 hours.     Gently clean wound with soap and water twice daily.    After cleaning and patting dry, apply antibiotic ointment to the wound site with a Q-tip.    Do not let thick, dark, adherent crust form since this may delay healing.  If a crust develops, use Q-tip and hydrogen peroxide diluted 1 to 1 with tap water to gently break it up.  Never pour/soak wound in hydrogen peroxide.    Cover with non-stick dressing (like Band-Aid)    Keep wound covered until well-healed.      Signs of Infection:  Increase in redness (In initial stages, redness is normal at the wound edge but should not continue to increase)  Increase in pain and swelling (Similar to redness, pain and swelling is a normal part of healing, but should not continue or worsen after 3-5 days)  Yellowish drainage or fever after a few days    Itching is normal part of the healing process.  If severe or redness and water blisters develop, you may be allergic to ointment or tape.      Scar Care (after wound is completely healed):   * If  Keloid history or suspected see Dermatology ASAP  Use sunscreen if sun exposure to decrease scarring  Once healed, use silicone sheeting nightly for 1-2 months

## 2025-05-21 ENCOUNTER — OFFICE VISIT (OUTPATIENT)
Dept: PSYCHIATRY | Facility: CLINIC | Age: 45
End: 2025-05-21
Payer: COMMERCIAL

## 2025-05-21 DIAGNOSIS — F41.1 GENERALIZED ANXIETY DISORDER: ICD-10-CM

## 2025-05-21 DIAGNOSIS — F31.81 BIPOLAR II DISORDER: Primary | ICD-10-CM

## 2025-05-21 DIAGNOSIS — F90.0 ATTENTION DEFICIT HYPERACTIVITY DISORDER (ADHD), PREDOMINANTLY INATTENTIVE TYPE: ICD-10-CM

## 2025-05-21 PROCEDURE — 90837 PSYTX W PT 60 MINUTES: CPT | Mod: 95,,, | Performed by: PSYCHOLOGIST

## 2025-05-21 PROCEDURE — 1159F MED LIST DOCD IN RCRD: CPT | Mod: CPTII,95,, | Performed by: PSYCHOLOGIST

## 2025-05-27 NOTE — PROGRESS NOTES
The patient location is: [place]  The chief complaint leading to consultation is: mood and interpersonal problems.     Visit type: audiovisual    Face to Face time with patient: 52  60 minutes of total time spent on the encounter, which includes face to face time and non-face to face time preparing to see the patient (eg, review of tests), Obtaining and/or reviewing separately obtained history, Documenting clinical information in the electronic or other health record, Independently interpreting results (not separately reported) and communicating results to the patient/family/caregiver, or Care coordination (not separately reported).       Each patient to whom he or she provides medical services by telemedicine is:  (1) informed of the relationship between the physician and patient and the respective role of any other health care provider with respect to management of the patient; and (2) notified that he or she may decline to receive medical services by telemedicine and may withdraw from such care at any time.    Notes:               Outpatient Psychiatry Telemedicine Therapy Visit  [facility] [place]   05/27/2025  Veronique Louise       History of Present Illness    CHIEF COMPLAINT:  Patient presents for a follow-up visit to discuss her ongoing management of bipolar II disorder, generalized anxiety disorder, and ADHD.    HPI:  Patient has been attending weekly therapy sessions and reports significant improvement in her overall condition. She continues to work on managing symptoms and implementing coping strategies learned in therapy. Patient discusses complex relationship dynamics involving her spouse (Hayden) and another individual (Antonieta), experiencing emotional challenges related to balancing these relationships and managing expectations. She has been working on setting and maintaining boundaries, which she finds helpful in managing her  interpersonal relationships.    Patient reports improvement in her ability to regulate her emotions and handle stressful situations. She no longer experiences the same physiological responses to stress as before, indicating progress in her emotional management skills. Patient's spouse recently lost his job, which could potentially impact her stress levels and mental health. However, she demonstrates improved coping skills by handling the situation calmly and rationally, focusing on practical solutions and maintaining a positive outlook.    Patient discusses her involvement in caring for her gf who is experiencing severe, ongoing pain. This situation appears to be a source of stress and concern for the patient, potentially impacting her own mental health and daily functioning.    LIFESTYLE:  Patient's spouse, Hayden, was recently let go from his job in fire protection. She is part of a two-income family, indicating her current employment status. Patient is in a polyamorous relationship, maintaining relationships with both her spouse Hayden and a partner named Antonieta. Patient stated she and her  grew up in a Southern, Black family and they have that in common. Her interests include meditation and working out, and she expresses a desire to learn Italian. She engages in planning vacations, attending therapy sessions, managing household responsibilities, and caring for her family members.      ROS:  Neurological: -headache, -dizziness, +numbness, -tingling  Psychiatric: +anxiety, -depression, +sleep difficulty        Medical Necessity for 75082 sessions, if applicable: (Longer session justified and medically necessary due to:)  []  Use of technique that take longer to implement (e.g. EMDR techniques/Systematic Desensitization/ DBT Protocol)  []  Severity of symptoms  []  More session time needed to process trauma-related emotions  []  More time needed to create and monitor relapse prevention protocols  [x]  Client  has dual diagnosis -- requires extra time to address both diagnoses   [x]  Need to manage high-conflict interactions of couple/family  []  Extra time needed to monitor crisis issues and safety planning  []  Client has extreme difficulty identifying [emotions/motivations], requires more session time to process and gain insights   []  Not applicable  []  Other:        Risk Parameters:  Patient reports no suicidal ideation  Patient reports no homicidal ideation  Patient reports no self-injurious behavior  Patient reports no violent behavior    Assessment & Plan    IMPRESSION:  - Continued therapy for bipolar II disorder, generalized anxiety disorder, and ADHD.  - Showing significant improvement with weekly therapy sessions.  - Monitoring progress in practicing boundary-setting techniques.  - Assessed ability to manage emotional responses and interpersonal relationships.  - Evaluated effectiveness of current treatment approach in addressing mental health concerns.    DIAGNOSIS:  Bipolar II disorder  DALTON  ADHD    INTERPERSONAL RELATIONSHIPS AND BOUNDARY SETTING:  - Discussed importance of boundary-setting in relationships and concept of internal listening and speaking boundary.  - Discussed value of recognizing and accepting nurturing tendencies while setting appropriate limits.  - Patient to practice implementing boundaries in relationships and continue working on internal listening and speaking boundary techniques.  - Patient to send boundary practice documents.    FOLLOW-UP CARE:  - Follow up for weekly therapy sessions.          Pt instructed to call clinic, 911 or go to nearest emergency room if sxs worsen or pt is in crisis or suicidal. The pt expresses understanding.    Psychotherapy:   Target symptom(s):   Why chosen therapy is appropriate versus another modality: CBT used; relevant to diagnosis, patient responds to this modality  Outcome monitoring methods: self-report, observation  Therapeutic intervention  type: PIT and CBT  Topics discussed/themes: building skills sets for symptom management and symptom recognition  The patient's response to the intervention and treatment is: excellent  The patient's progress toward treatment goals: excellent      Each patient to whom he or she provides medical services by telemedicine is:  (1) informed of the relationship between the physician and patient and the respective role of any other health care provider with respect to management of the patient; and (2) notified that he or she may decline to receive medical services by telemedicine and may withdraw from such care at any time.    This note was generated with the assistance of ambient listening technology. Verbal consent was obtained by the patient and accompanying visitor(s) for the recording of patient appointment to facilitate this note. I attest to having reviewed and edited the generated note for accuracy, though some syntax or spelling errors may persist. Please contact the author of this note for any clarification.       Charly Zambrano PsyD  Licensed Clinical Psychologist

## 2025-05-28 ENCOUNTER — OFFICE VISIT (OUTPATIENT)
Dept: PSYCHIATRY | Facility: CLINIC | Age: 45
End: 2025-05-28
Payer: COMMERCIAL

## 2025-05-28 DIAGNOSIS — F31.81 BIPOLAR II DISORDER: Primary | ICD-10-CM

## 2025-05-28 DIAGNOSIS — F90.0 ATTENTION DEFICIT HYPERACTIVITY DISORDER (ADHD), PREDOMINANTLY INATTENTIVE TYPE: ICD-10-CM

## 2025-05-28 DIAGNOSIS — F41.1 GENERALIZED ANXIETY DISORDER: ICD-10-CM

## 2025-05-28 NOTE — PROGRESS NOTES
Outpatient Psychiatry Telemedicine Therapy Visit  [facility]  [facility] [place]   05/29/2025  Veronique Louise       History of Present Illness    CHIEF COMPLAINT:  Patient presents for a follow-up session, mentioning stress related to taking care of her gf who had shoulder surgery.     HPI:  Patient wanted to discuss boundaries today. Taught her boundaries from [facility]. Role played in session. She discussed interpersonal relationships with her daughters and . We practiced using boundaries. She said she is feeling much calmer. Credits therapy and said she is surprised how much better she feels. Therapist reiterated skills learned in therapy. Relaxing the nervous system, separate thinking realities, boundaries and communication skills. She is using all skills and feels better.          The patient location is: [place]  The chief complaint leading to consultation is: No chief complaint on file.       Visit type: audiovisual    Face to Face time with patient: 51  60 minutes of total time spent on the encounter, which includes face to face time and non-face to face time preparing to see the patient (eg, review of tests), Obtaining and/or reviewing separately obtained history, Documenting clinical information in the electronic or other health record, Independently interpreting results (not separately reported) and communicating results to the patient/family/caregiver, or Care coordination (not separately reported).     The session began with a review of the patient's goals for therapy.      Medical Necessity for 00883 sessions, if applicable: (Longer session justified and medically necessary due to:)  []  Use of technique that take longer to implement (e.g. EMDR techniques/Systematic Desensitization/ DBT Protocol)  [x]  Severity of symptoms  []  More session time needed to process trauma-related emotions  []  More time needed to create and monitor relapse  prevention protocols  []  Client has dual diagnosis -- requires extra time to address both diagnoses   []  Need to manage high-conflict interactions of couple/family  []  Extra time needed to monitor crisis issues and safety planning  []  Client has extreme difficulty identifying [emotions/motivations], requires more session time to process and gain insights   []  Not applicable  []  Other:        Risk Parameters:  Patient reports no suicidal ideation  Patient reports no homicidal ideation  Patient reports no self-injurious behavior  Patient reports no violent behavior    Assessment & Plan        Bipolar II Disorder  DALTON  ADHD     Follow up in 1 week    Pt instructed to call clinic, 911 or go to nearest emergency room if sxs worsen or pt is in crisis or suicidal. The pt expresses understanding.    Psychotherapy:   Target symptom(s):   Why chosen therapy is appropriate versus another modality: CBT used; relevant to diagnosis, patient responds to this modality  Outcome monitoring methods: self-report, observation  Therapeutic intervention type: Psychoeducation, skill building, role playing  Topics discussed/themes: building skills sets for symptom management and symptom recognition  The patient's response to the intervention and treatment is: good  The patient's progress toward treatment goals: excellent      Each patient to whom he or she provides medical services by telemedicine is:  (1) informed of the relationship between the physician and patient and the respective role of any other health care provider with respect to management of the patient; and (2) notified that he or she may decline to receive medical services by telemedicine and may withdraw from such care at any time.    This note was generated with the assistance of ambient listening technology. Verbal consent was obtained by the patient and accompanying visitor(s) for the recording of patient appointment to facilitate this note. I attest to having  reviewed and edited the generated note for accuracy, though some syntax or spelling errors may persist. Please contact the author of this note for any clarification.    Each patient to whom he or she provides medical services by telemedicine is:  (1) informed of the relationship between the physician and patient and the respective role of any other health care provider with respect to management of the patient; and (2) notified that he or she may decline to receive medical services by telemedicine and may withdraw from such care at any time.    Charly Zambrano PsyD  Licensed Clinical Psychologist

## 2025-05-30 ENCOUNTER — PATIENT MESSAGE (OUTPATIENT)
Dept: ADMINISTRATIVE | Facility: OTHER | Age: 45
End: 2025-05-30
Payer: COMMERCIAL

## 2025-05-30 ENCOUNTER — OFFICE VISIT (OUTPATIENT)
Dept: INTERNAL MEDICINE | Facility: CLINIC | Age: 45
End: 2025-05-30
Payer: COMMERCIAL

## 2025-05-30 VITALS
BODY MASS INDEX: 35.19 KG/M2 | SYSTOLIC BLOOD PRESSURE: 132 MMHG | HEART RATE: 87 BPM | HEIGHT: 64 IN | DIASTOLIC BLOOD PRESSURE: 78 MMHG | WEIGHT: 206.13 LBS | OXYGEN SATURATION: 100 %

## 2025-05-30 DIAGNOSIS — F41.1 GENERALIZED ANXIETY DISORDER: ICD-10-CM

## 2025-05-30 DIAGNOSIS — Z90.79 STATUS POST BILATERAL SALPINGECTOMY: ICD-10-CM

## 2025-05-30 DIAGNOSIS — B00.9 HSV INFECTION: ICD-10-CM

## 2025-05-30 DIAGNOSIS — F31.81 BIPOLAR II DISORDER: ICD-10-CM

## 2025-05-30 DIAGNOSIS — F33.1 MODERATE EPISODE OF RECURRENT MAJOR DEPRESSIVE DISORDER: ICD-10-CM

## 2025-05-30 DIAGNOSIS — F90.0 ATTENTION DEFICIT HYPERACTIVITY DISORDER (ADHD), PREDOMINANTLY INATTENTIVE TYPE: ICD-10-CM

## 2025-05-30 DIAGNOSIS — Z80.0 FAMILY HISTORY OF COLON CANCER: ICD-10-CM

## 2025-05-30 DIAGNOSIS — Z00.00 ANNUAL PHYSICAL EXAM: Primary | ICD-10-CM

## 2025-05-30 DIAGNOSIS — D64.9 ANEMIA, UNSPECIFIED TYPE: ICD-10-CM

## 2025-05-30 DIAGNOSIS — E05.90 SUBCLINICAL HYPERTHYROIDISM: ICD-10-CM

## 2025-05-30 DIAGNOSIS — E66.9 OBESITY, UNSPECIFIED CLASS, UNSPECIFIED OBESITY TYPE, UNSPECIFIED WHETHER SERIOUS COMORBIDITY PRESENT: ICD-10-CM

## 2025-05-30 PROCEDURE — 99999 PR PBB SHADOW E&M-EST. PATIENT-LVL IV: CPT | Mod: PBBFAC,,, | Performed by: STUDENT IN AN ORGANIZED HEALTH CARE EDUCATION/TRAINING PROGRAM

## 2025-05-30 RX ORDER — VALACYCLOVIR HYDROCHLORIDE 1 G/1
1000 TABLET, FILM COATED ORAL DAILY
Qty: 90 TABLET | Refills: 3 | Status: SHIPPED | OUTPATIENT
Start: 2025-05-30 | End: 2026-05-30

## 2025-05-30 NOTE — PROGRESS NOTES
[facility]    Subjective:       Patient ID: Veronique Louise is a 45 y.o. female.    Chief Complaint: Annual Exam      History was obtained from the patient and supplemented through chart review.  This patient is known to me.     HPI:    Patient is a 45 y.o. female who presents for annual    Subclinical hyperthyroidism  Was on methimazole in the past, waiting until  [facility], being followed by Dr. Manzo and fellows  $1800 for NM radioactive uptake, inconclusive, no need for treatment if aysmptomatic    Obesity 2/2 psychoactive medications  Already working on diet, exercise  Consider     Major depression  Later dx of bipolar, PTSD, anxiety, ADHD  Loss of child with choking incident  Went to therapy  On latuda, vyvanse, trazaone, buspar 10 bid  Med psychistatrist/psychologist Dr. Estevan Armendariz    Anemia  Check iron labs in addition to recent labs ordered by [facility]    Interested in genetics at some point? referred    Family hx of colon cancer in Dad age 63, late stage  C-scope 11/2022 Dr. CORAL Louise at [facility], repeat 5 years    Working full time    HSV  Valtrex 1000 mg PRN, sleep   Every 3-5 months    17, 10 year old at home     Medical History  Past Medical History:   Diagnosis Date    Anemia     Breast disorder     Abscess post childbirth    Gestational diabetes     Gestational with last child    HSV infection     Major depressive disorder, recurrent, unspecified 10/12/2015    Subclinical hyperthyroidism 12/19/2016       Review of Systems   Constitutional:  Negative for fever.   HENT:  Negative for trouble swallowing.    Respiratory:  Negative for shortness of breath.    Cardiovascular:  Negative for chest pain.   Gastrointestinal:  Negative for constipation, diarrhea and vomiting.   Musculoskeletal:  Negative for gait problem.   Neurological:  Negative for dizziness and seizures.   Psychiatric/Behavioral:  Negative for hallucinations.          Surgical hx, family hx, social hx   Have been  "reviewed      Current Outpatient Medications:     busPIRone (BUSPAR) 10 MG tablet, Take 1 tablet (10 mg) by mouth 2 times per day, Disp: 180 tablet, Rfl: 3    lisdexamfetamine (VYVANSE) 40 MG Cap, Take 40 mg by mouth every morning., Disp: , Rfl:     lurasidone (LATUDA) 40 mg Tab tablet, Take 1 tablet (40 mg) by mouth daily with food, Disp: 90 tablet, Rfl: 3    traZODone (DESYREL) 50 MG tablet, Take 1 tablet (50 mg) by mouth daily at bedtime as needed, Disp: 90 tablet, Rfl: 3    busPIRone (BUSPAR) 10 MG tablet, Take 1 tablet (10 mg) by mouth 3 times per day (Patient not taking: Reported on 5/30/2025), Disp: 90 tablet, Rfl: 3    diclofenac sodium (VOLTAREN ARTHRITIS PAIN) 1 % Gel, Apply 4 g topically 3 (three) times daily as needed (Pain). (Patient not taking: Reported on 5/30/2025), Disp: 150 g, Rfl: 1    norethindrone (AYGESTIN) 5 mg Tab, Take 1 tablet (5 mg total) by mouth 3 (three) times daily. (Patient not taking: Reported on 9/5/2024), Disp: 30 tablet, Rfl: 1    valACYclovir (VALTREX) 1000 MG tablet, Take 1 tablet (1,000 mg total) by mouth once daily., Disp: 90 tablet, Rfl: 3    Objective:        Body mass index is 35.38 kg/m².  Vitals:    05/30/25 0911   BP: 132/78   Pulse: 87   SpO2: 100%   Weight: 93.5 kg (206 lb 2.1 oz)   Height: 5' 4" (1.626 m)   PainSc: 0-No pain         Physical Exam  Vitals and nursing note reviewed.   Constitutional:       General: She is not in acute distress.     Appearance: Normal appearance. She is not ill-appearing.   HENT:      Head: Normocephalic and atraumatic.   Eyes:      General: No scleral icterus.  Cardiovascular:      Rate and Rhythm: Normal rate and regular rhythm.      Heart sounds: Normal heart sounds.   Pulmonary:      Effort: Pulmonary effort is normal.   Musculoskeletal:         General: No deformity.      Cervical back: Normal range of motion.   Skin:     General: Skin is warm and dry.   Neurological:      Mental Status: She is alert and oriented to person, place, " and time.   Psychiatric:         Behavior: Behavior normal.           Lab Results   Component Value Date    WBC 7.76 05/14/2024    HGB 12.5 05/14/2024    HCT 39.0 05/14/2024     05/14/2024    CHOL 178 05/14/2024    TRIG 67 05/14/2024    HDL 59 05/14/2024    ALT 12 05/14/2024    AST 13 05/14/2024     05/14/2024    K 4.3 05/14/2024     05/14/2024    CREATININE 0.7 05/14/2024    BUN 12 05/14/2024    CO2 27 05/14/2024    TSH 0.061 (L) 05/14/2024    HGBA1C 5.4 05/14/2024       The 10-year ASCVD risk score (Aga WESTFALL, et al., 2019) is: 0.8%    Values used to calculate the score:      Age: 45 years      Sex: Female      Is Non- : Yes      Diabetic: No      Tobacco smoker: No      Systolic Blood Pressure: 132 mmHg      Is BP treated: No      HDL Cholesterol: 59 mg/dL      Total Cholesterol: 178 mg/dL    (Imaging have been independently reviewed)    Reviewed mammo    Assessment:         1. Annual physical exam    2. Generalized anxiety disorder    3. Bipolar II disorder    4. Attention deficit hyperactivity disorder (ADHD), predominantly inattentive type    5. Anemia, unspecified type    6. Family history of colon cancer    7. Subclinical hyperthyroidism    8. Obesity, unspecified class, unspecified obesity type, unspecified whether serious comorbidity present    9. Moderate episode of recurrent major depressive disorder    10. Status post bilateral salpingectomy    11. HSV infection              Plan:     Veronique was seen today for annual exam.    Diagnoses and all orders for this visit:    Annual physical exam  -     CBC Without Differential; Future  -     Comprehensive Metabolic Panel; Future  -     Hemoglobin A1C; Future  -     Lipid Panel; Future  -     TSH; Future    Generalized anxiety disorder    Bipolar II disorder    Attention deficit hyperactivity disorder (ADHD), predominantly inattentive type    Anemia, unspecified type  -     Ferritin; Future  -     Iron and TIBC;  Future    Family history of colon cancer  -     Ambulatory referral/consult to Genetics; Future    Subclinical hyperthyroidism    Obesity, unspecified class, unspecified obesity type, unspecified whether serious comorbidity present    Moderate episode of recurrent major depressive disorder    Status post bilateral salpingectomy    HSV infection  -     valACYclovir (VALTREX) 1000 MG tablet; Take 1 tablet (1,000 mg total) by mouth once daily.            Health Maintenance  - Lipids:  - A1C:   - Colon Ca Screen: C-scope 11/2022 Dr. CORAL Louise at [facility], repeat 5 years  - Immunizations:    Women's health  - Pap: HPV neg 2018, due July Dr. Campos  - Mammo:  utd  - Dexa: na  - Contraception: s/p salpingectomy     All of your core healthy metrics are met.      Follow up in about 1 year (around 5/30/2026). or sooner prn        All medications were reviewed including potential side effects and risks/benefits.  Pt was counseled to call back if anything worsens or if questions arise.        González Alves MD  Family Medicine  [facility]  [facility]  [address]  [place] [address]  Phone [phone]  Fax [phone]

## 2025-06-04 ENCOUNTER — OFFICE VISIT (OUTPATIENT)
Dept: PSYCHIATRY | Facility: CLINIC | Age: 45
End: 2025-06-04
Payer: COMMERCIAL

## 2025-06-04 DIAGNOSIS — F90.0 ATTENTION DEFICIT HYPERACTIVITY DISORDER (ADHD), PREDOMINANTLY INATTENTIVE TYPE: ICD-10-CM

## 2025-06-04 DIAGNOSIS — F31.81 BIPOLAR II DISORDER: Primary | ICD-10-CM

## 2025-06-04 DIAGNOSIS — F41.1 GENERALIZED ANXIETY DISORDER: ICD-10-CM

## 2025-06-04 PROCEDURE — 90837 PSYTX W PT 60 MINUTES: CPT | Mod: 95,,, | Performed by: PSYCHOLOGIST

## 2025-06-04 PROCEDURE — 1159F MED LIST DOCD IN RCRD: CPT | Mod: CPTII,95,, | Performed by: PSYCHOLOGIST

## 2025-06-11 ENCOUNTER — OFFICE VISIT (OUTPATIENT)
Dept: PSYCHIATRY | Facility: CLINIC | Age: 45
End: 2025-06-11
Payer: COMMERCIAL

## 2025-06-11 DIAGNOSIS — F31.81 BIPOLAR II DISORDER: Primary | ICD-10-CM

## 2025-06-11 DIAGNOSIS — F41.1 GENERALIZED ANXIETY DISORDER: ICD-10-CM

## 2025-06-11 DIAGNOSIS — F90.0 ATTENTION DEFICIT HYPERACTIVITY DISORDER (ADHD), PREDOMINANTLY INATTENTIVE TYPE: ICD-10-CM

## 2025-06-11 PROCEDURE — 90837 PSYTX W PT 60 MINUTES: CPT | Mod: 95,,, | Performed by: PSYCHOLOGIST

## 2025-06-11 PROCEDURE — 1159F MED LIST DOCD IN RCRD: CPT | Mod: CPTII,95,, | Performed by: PSYCHOLOGIST

## 2025-06-11 NOTE — PROGRESS NOTES
Outpatient Psychiatry Telemedicine Therapy Visit  [facility]  [facility] [place]   06/11/2025  Veronique Louise        History of Presenting Illness:      Pt presents for a follow up visit. Patient was seen, examined and chart was reviewed. Patient reviewed and agreed to informed consent and limits of confidentiality.    The patient location is: [place]. Patient is at home.   The chief complaint leading to consultation is:  bipolar disorder and interpersonal problems.     Individual therapy session.   Visit type: audiovisual  Face to Face time with patient 52 minutes    Current Stressors: taking care of her gf who is emotional and in pain, juggling her many relationship roles, her 's unemployment    Psychiatric Symptoms Review    Depression Symptoms: feeling down, critical inner voice     Anxiety Symptoms: anxious thoughts, feeling like I am not doing enough    Mone Symptoms: Pt denied current or history of related symptoms.    Psychosis Symptoms: Pt denied current or history of related symptoms.    Attention/Concentration Symptoms: Pt denied current or history of related symptoms.    Disordered Eating/Body Image Concerns: Pt denied current or history of related symptoms.    Suicidal Ideation and Risk:  Pt denied current or history of related symptoms.    Access to gun: no    Homicidal/Violent Ideation and Risk: Pt denied current or history of related symptoms.    Prior Mental Health Treatment:    Prior psychotherapy: Yes    Prior Psychiatric Hospitalizations:  Pt denied.     Current psychiatric medication: buspirone 10 mg, Vyvanse 40 mg, Latuda 40 mg, trazodone 50 mg      Progress and Session Notes  She wanted to discuss her thoughts, feelings and behaviors. She reported she was in a hypomanic episode followed by a depressive episode. She talked through her thoughts and feelings. Therapist noted and gave her credit for noticing her reality and using  her tools. She is still stressed with care taking her gf. She feels she is not giving enough to her children.Helped her explore her many care taking roles. She is able to not feel resentful anymore and she sees it as superpower now. She asked the question of how to deal with her gf when she is panicking. She came to her own conclusion which was to use her boundaries. Good insight. Will continue weekly sessions to help improve her mood and functioning. Therapist is using ACT to help her improve.     Diagnostic Impressions:  1. Bipolar II disorder    2. Generalized anxiety disorder    3. Attention deficit hyperactivity disorder (ADHD), predominantly inattentive type        Prognosis:   [x]  Prognosis favorable based on client's progress/motivation to participate   []  Other:       Medical Necessity for 80444 sessions, if applicable: (Longer session justified and medically necessary due to:)  []  Use of technique that take longer to implement (e.g. EMDR techniques/Systematic Desensitization/ DBT Protocol)  [x]  Severity of symptoms  []  More session time needed to process trauma-related emotions  []  More time needed to create and monitor relapse prevention protocols  [x]  Client has dual diagnosis -- requires extra time to address both diagnoses   [x]  Need to manage high-conflict interactions of couple/family  []  Extra time needed to monitor crisis issues and safety planning  []  Client has extreme difficulty identifying [emotions/motivations], requires more session time to process and gain insights   []  Not applicable  []  Other:     Mental Status Exam:     Appearance: unremarkable  Grooming: appropriate to situation  Arousal: alert, awake  Behavior/Cooperation: cooperative  Speech: normal rate, volume, tone  Language: appropriate english vocabulary  Mood: Depressed and Anxious  Affect: Euthymic, congruent to mood  Thought Process: linear, logical  Thought Content: : normal  Orientation: Grossly intact  Attention  Span/Concentration: able to focus  Awareness: intact  Judgment: intact    Summary:    -Spent 60 min face to face with the pt; 52 minutes counseling  -Conducted diagnostic interview  -Pt instructed to call clinic, 911 or go to nearest emergency room if sxs worsen or pt is in crisis or suicidal. The pt expresses understanding.    Each patient to whom he or she provides medical services by telemedicine is:  (1) informed of the relationship between the physician and patient and the respective role of any other health care provider with respect to management of the patient; and (2) notified that he or she may decline to receive medical services by telemedicine and may withdraw from such care at any time.    This author reviewed limits to confidentiality. Pt indicated understanding and denied any questions.    Return to Clinic:   1 week      Charly Zambrano PsyD  Licensed Clinical Psychologist

## 2025-06-18 ENCOUNTER — OFFICE VISIT (OUTPATIENT)
Dept: PSYCHIATRY | Facility: CLINIC | Age: 45
End: 2025-06-18
Payer: COMMERCIAL

## 2025-06-18 DIAGNOSIS — F90.0 ATTENTION DEFICIT HYPERACTIVITY DISORDER (ADHD), PREDOMINANTLY INATTENTIVE TYPE: ICD-10-CM

## 2025-06-18 DIAGNOSIS — F41.1 GENERALIZED ANXIETY DISORDER: Primary | ICD-10-CM

## 2025-06-18 DIAGNOSIS — F31.81 BIPOLAR II DISORDER: ICD-10-CM

## 2025-06-18 PROCEDURE — 90837 PSYTX W PT 60 MINUTES: CPT | Mod: 95,,, | Performed by: PSYCHOLOGIST

## 2025-06-18 PROCEDURE — 1159F MED LIST DOCD IN RCRD: CPT | Mod: CPTII,95,, | Performed by: PSYCHOLOGIST

## 2025-06-18 NOTE — PROGRESS NOTES
Outpatient Psychiatry Telemedicine Therapy Visit  [facility]  [facility] [place]   06/18/2025  Veronique Louise        History of Presenting Illness:      Pt presents for follow up visit for ongoing mood and interpersonal problems. Patient was seen, examined and chart was reviewed. Patient reviewed and agreed to informed consent and limits of confidentiality.    The patient location is: [place]  The chief complaint leading to consultation is: bipolar II disorder, DALTON and interpersonal problems.      Visit type: audiovisual    Face to Face time with patient: 50  60 minutes of total time spent on the encounter, which includes face to face time and non-face to face time preparing to see the patient (eg, review of tests), Obtaining and/or reviewing separately obtained history, Documenting clinical information in the electronic or other health record, Independently interpreting results (not separately reported) and communicating results to the patient/family/caregiver, or Care coordination (not separately reported).     The session started with a review of therapy goal(s).     Interval History and Content of Current Session:  Content of Current Session:  Last session date: 6/11/2025     Problem(s) Discussed:  She is using her tools learned in therapy. She has set her intention for the summer per our last session. One intention is to spend more quality time with her girls.   She said she is working on boundaries. She has trouble with boundaries with her gf.   We discussed how she is practicing boundaries. She appreciated the progress she has made thus far. She said she is more calm and her other relationships are getting better.   She discussed her problems with internal boundaries.      Symptoms/Impairment:   Anxiety, uncertainty, insecurity in relationships    In-session Interventions:  Offered guidance and psychoeducation as she struggles to learn the new skill of  internal boundaries.   Psychoeducation on emotional overwhelm and what to do when triggered into emotional overwhelm in relationships. Used cognitive defusion and interoception to calm the nervous system. Taught ACT concepts.     Prognosis:   [x]  Prognosis favorable based on client's progress/motivation to participate   []  Other:       Medical Necessity for 80398 sessions, if applicable: (Longer session justified and medically necessary due to:)  []  Use of technique that take longer to implement (e.g. EMDR techniques/Systematic Desensitization/ DBT Protocol)  [x]  Severity of symptoms  []  More session time needed to process trauma-related emotions  []  More time needed to create and monitor relapse prevention protocols  [x]  Client has dual diagnosis -- requires extra time to address both diagnoses   []  Need to manage high-conflict interactions of couple/family  []  Extra time needed to monitor crisis issues and safety planning  []  Client has extreme difficulty identifying [emotions/motivations], requires more session time to process and gain insights   []  Not applicable  []  Other:        Risk Parameters:  Patient reports no suicidal ideation  Patient reports no homicidal ideation  Patient reports no self-injurious behavior  Patient reports no violent behavior    Diagnostic Impressions:  Diagnosis(es):   DALTON  Bipolar II  ADHD    Treatment Plan:  1. Continue with Short-term therapy with this author     2. Pt instructed to call clinic, 911 or go to nearest emergency room if sxs worsen or pt is in crisis or suicidal. The pt expresses understanding.    Psychotherapy:   Target symptom(s):   Why chosen therapy is appropriate versus another modality: CBT used; relevant to diagnosis, patient responds to this modality  Outcome monitoring methods: self-report, observation  Therapeutic intervention type: ACT  Topics discussed/themes: building skills sets for symptom management and symptom recognition  The patient's  response to the intervention and treatment is: good  The patient's progress toward treatment goals: good     Return to clinic:   One week follow up     Charly Zambrano PsyD   Licensed Clinical Psychologist    Each patient to whom he or she provides medical services by telemedicine is:  (1) informed of the relationship between the physician and patient and the respective role of any other health care provider with respect to management of the patient; and (2) notified that he or she may decline to receive medical services by telemedicine and may withdraw from such care at any time.

## 2025-06-25 ENCOUNTER — OFFICE VISIT (OUTPATIENT)
Dept: PSYCHIATRY | Facility: CLINIC | Age: 45
End: 2025-06-25
Payer: COMMERCIAL

## 2025-06-25 DIAGNOSIS — F41.1 GAD (GENERALIZED ANXIETY DISORDER): ICD-10-CM

## 2025-06-25 DIAGNOSIS — F31.81 BIPOLAR II DISORDER: Primary | ICD-10-CM

## 2025-06-25 DIAGNOSIS — F90.2 ATTENTION DEFICIT HYPERACTIVITY DISORDER (ADHD), COMBINED TYPE: ICD-10-CM

## 2025-06-25 PROCEDURE — 1159F MED LIST DOCD IN RCRD: CPT | Mod: CPTII,95,, | Performed by: PSYCHOLOGIST

## 2025-06-25 PROCEDURE — 90837 PSYTX W PT 60 MINUTES: CPT | Mod: 95,,, | Performed by: PSYCHOLOGIST

## 2025-06-25 NOTE — PROGRESS NOTES
"  Outpatient Psychiatry Telemedicine Therapy Visit  [facility]  [facility] [place]   06/25/2025  Veronique Louise        History of Presenting Illness:      Pt presents for a follow up visit. Patient was seen, examined and chart was reviewed. Patient reviewed and agreed to informed consent and limits of confidentiality.    The patient location is: [place]  The chief complaint leading to consultation is: bipolar disorder, anxiety and interpersonal problems.      Visit type: audiovisual    Face to Face time with patient: 52 minutes  60 minutes of total time spent on the encounter, which includes face to face time and non-face to face time preparing to see the patient (eg, review of tests), Obtaining and/or reviewing separately obtained history, Documenting clinical information in the electronic or other health record, Independently interpreting results (not separately reported) and communicating results to the patient/family/caregiver, or Care coordination (not separately reported).     The session started with a review of therapy goal(s).     Interval History and Content of Current Session:  Content of Current Session:  Last session date: 6/18/2025     Problem(s) Discussed:  She is very anxious today. Her gf is in pain and recovering from a surgery. The patient is being triggered. She is practicing boundaries. She is practicing expressing herself. She notices she has defensive mechanics showing up when her gf is emotional. We looked at her past defensive patterns and how to let them go. Asked her questions to help her identify ways she can change. She expressed a desire to let go of sugar. She tried to create distance between craving for sugar and building in a pause. She goes to her favorite bakery daily but has decreased this habit.   She is listening to life coaching podcasts. She is noticing she is emotionally overwhelmed.   "I try to not get angry at them." " She is practicing managing anger when speaking to her family.   She shared she is trying to affirm herself from within.   Symptoms/Impairment:   Over eating sugar, feeling confused, poor self care regarding exercise, anxiety and self judgement    In-session Interventions:  ACT. Psychoeducation on cognitive defusion and mindfulness skills. Asked insight oriented questions and explored her wins.     Prognosis:   [x]  Prognosis favorable based on client's progress/motivation to participate   []  Other:       Medical Necessity for 279[address] sessions, if applicable: (Longer session justified and medically necessary due to:)  []  Use of technique that take longer to implement (e.g. EMDR techniques/Systematic Desensitization/ DBT Protocol)  [x]  Severity of symptoms  []  More session time needed to process trauma-related emotions  []  More time needed to create and monitor relapse prevention protocols  [x]  Client has dual diagnosis -- requires extra time to address both diagnoses   []  Need to manage high-conflict interactions of couple/family  []  Extra time needed to monitor crisis issues and safety planning  []  Client has extreme difficulty identifying [emotions/motivations], requires more session time to process and gain insights   []  Not applicable  []  Other:        Risk Parameters:  Patient reports no suicidal ideation  Patient reports no homicidal ideation  Patient reports no self-injurious behavior  Patient reports no violent behavior    Diagnostic Impressions:  Diagnosis(es):   Bipolar II Disorder  DALTON  ADHD, Inattentive type    Treatment Plan:  1. Continue with Short-term therapy with this author     2. Pt instructed to call clinic, 911 or go to nearest emergency room if sxs worsen or pt is in crisis or suicidal. The pt expresses understanding.    Psychotherapy:   Target symptom(s):   Why chosen therapy is appropriate versus another modality: CBT used; relevant to diagnosis, patient responds to this  modality  Outcome monitoring methods: self-report, observation  Therapeutic intervention type: individual  Topics discussed/themes: building skills sets for symptom management and symptom recognition  The patient's response to the intervention and treatment is: good  The patient's progress toward treatment goals: good     Return to clinic:   1 week    Charly Zambrano PsyD     Each patient to whom he or she provides medical services by telemedicine is:  (1) informed of the relationship between the physician and patient and the respective role of any other health care provider with respect to management of the patient; and (2) notified that he or she may decline to receive medical services by telemedicine and may withdraw from such care at any time.    {

## 2025-07-02 ENCOUNTER — OFFICE VISIT (OUTPATIENT)
Dept: PSYCHIATRY | Facility: CLINIC | Age: 45
End: 2025-07-02
Payer: COMMERCIAL

## 2025-07-02 DIAGNOSIS — F31.81 BIPOLAR II DISORDER: Primary | ICD-10-CM

## 2025-07-02 DIAGNOSIS — F41.1 GAD (GENERALIZED ANXIETY DISORDER): ICD-10-CM

## 2025-07-02 DIAGNOSIS — F90.2 ATTENTION DEFICIT HYPERACTIVITY DISORDER (ADHD), COMBINED TYPE: ICD-10-CM

## 2025-07-02 PROCEDURE — 1159F MED LIST DOCD IN RCRD: CPT | Mod: CPTII,95,, | Performed by: PSYCHOLOGIST

## 2025-07-02 PROCEDURE — 90837 PSYTX W PT 60 MINUTES: CPT | Mod: 95,,, | Performed by: PSYCHOLOGIST

## 2025-07-02 NOTE — PROGRESS NOTES
"  Outpatient Psychiatry Telemedicine Therapy Visit  [facility]  [facility] [place]   07/02/2025  Veronique Louise        History of Presenting Illness:      Pt presents for a follow up virtual appointment. Patient was seen, examined and chart was reviewed. Patient reviewed and agreed to informed consent and limits of confidentiality.    The patient location is: [place]  The chief complaint leading to consultation is: mood and stress problems     Visit type: audiovisual    Face to Face time with patient: 51 minutes  60 minutes of total time spent on the encounter, which includes face to face time and non-face to face time preparing to see the patient (eg, review of tests), Obtaining and/or reviewing separately obtained history, Documenting clinical information in the electronic or other health record, Independently interpreting results (not separately reported) and communicating results to the patient/family/caregiver, or Care coordination (not separately reported).     The session started with a review of therapy goal(s).     Interval History and Content of Current Session:  Content of Current Session:  Last session date: 6/25/2025     Problem(s) Discussed:  She is stressed with caring for her gf who has pain post surgery. She discussed the loss of her daughter. She said she has "broad optimism" again and it feels weird since she used to be optimistic but then she lost her daughter. She feels as if she is tempting fate in being happy. Allowed her to process the loss of her daughter and we processed through the stressors of helping her gf. "It is taxing to be around someone in pain all the time." She feels exhausted.   She feels she is neglecting her children this summer. Her  is helping with the kids. "This is a miracle."   She is exploring self care. She has limited time.   She notices she has changed her inner dialogue to more adaptive coping. Gave her " "credit.  She talked about self regulation which she has learned in therapy. She is using this skill and notices her partner doesn't. Her gf is very "intense."   We problems solved how she will communicate to her  the finances. She decided to keep her time light and do it in a fun setting. Supported her approach.   Symptoms/Impairment:   Emotional overwhelm, stress, exhaustion    In-session Interventions:  She is trying to use her tools in therapy. She is aware of her body and mind connection. Encouraged her to do 10 min micro exercises daily to reduce stress. She agreed to this.     Prognosis:   [x]  Prognosis favorable based on client's progress/motivation to participate   []  Other:       Medical Necessity for 68010 sessions, if applicable: (Longer session justified and medically necessary due to:)  []  Use of technique that take longer to implement (e.g. EMDR techniques/Systematic Desensitization/ DBT Protocol)  [x]  Severity of symptoms  []  More session time needed to process trauma-related emotions  []  More time needed to create and monitor relapse prevention protocols  [x]  Client has dual diagnosis -- requires extra time to address both diagnoses   []  Need to manage high-conflict interactions of couple/family  []  Extra time needed to monitor crisis issues and safety planning  []  Client has extreme difficulty identifying [emotions/motivations], requires more session time to process and gain insights   []  Not applicable  []  Other:        Risk Parameters:  Patient reports no suicidal ideation  Patient reports no homicidal ideation  Patient reports no self-injurious behavior  Patient reports no violent behavior    Diagnostic Impressions:  Diagnosis(es):   Bipolar II  DALTON  ADHD    Treatment Plan:  1. Continue with Short-term therapy with this author     2. Pt instructed to call clinic, 911 or go to nearest emergency room if sxs worsen or pt is in crisis or suicidal. The pt expresses " understanding.    Psychotherapy:   Target symptom(s):   Why chosen therapy is appropriate versus another modality: CBT used; relevant to diagnosis, patient responds to this modality  Outcome monitoring methods: self-report, observation  Therapeutic intervention type: individual therapy using ACT  Topics discussed/themes: building skills sets for symptom management and symptom recognition  The patient's response to the intervention and treatment is: good  The patient's progress toward treatment goals: good     Return to clinic:   One week    Charly Zambrano PsyD     Each patient to whom he or she provides medical services by telemedicine is:  (1) informed of the relationship between the physician and patient and the respective role of any other health care provider with respect to management of the patient; and (2) notified that he or she may decline to receive medical services by telemedicine and may withdraw from such care at any time.

## 2025-07-03 ENCOUNTER — TELEPHONE (OUTPATIENT)
Dept: HEMATOLOGY/ONCOLOGY | Facility: CLINIC | Age: 45
End: 2025-07-03
Payer: COMMERCIAL

## 2025-07-03 NOTE — TELEPHONE ENCOUNTER
Called and spoke to patient to confirm the in office visit on 7/10/25 and to complete the questionnaire.

## 2025-07-09 ENCOUNTER — TELEPHONE (OUTPATIENT)
Dept: HEMATOLOGY/ONCOLOGY | Facility: CLINIC | Age: 45
End: 2025-07-09
Payer: COMMERCIAL

## 2025-07-10 ENCOUNTER — PATIENT MESSAGE (OUTPATIENT)
Dept: HEMATOLOGY/ONCOLOGY | Facility: CLINIC | Age: 45
End: 2025-07-10

## 2025-07-10 ENCOUNTER — OFFICE VISIT (OUTPATIENT)
Dept: HEMATOLOGY/ONCOLOGY | Facility: CLINIC | Age: 45
End: 2025-07-10
Payer: COMMERCIAL

## 2025-07-10 DIAGNOSIS — Z80.0 FAMILY HISTORY OF COLON CANCER: ICD-10-CM

## 2025-07-10 DIAGNOSIS — Z71.83 ENCOUNTER FOR NONPROCREATIVE GENETIC COUNSELING: Primary | ICD-10-CM

## 2025-07-10 PROCEDURE — 99999 PR PBB SHADOW E&M-EST. PATIENT-LVL III: CPT | Mod: PBBFAC,,,

## 2025-07-10 NOTE — PROGRESS NOTES
Cancer Genetics  Hereditary and High-Risk Clinic  Department of Hematology and Oncology  [facility]    Date of Service:  7/10/25  [place] Provider:  [place]os Sharpe, [facility] [place] [facility] [place]  Collaborating Physician:  Amish Padilla MD    Patient ID  Name: Veronique Louise    : 1980    MRN: [id number]      Referring Provider:   González Alves MD  [address]  [address]  [place] [address]    Visit Information  The patient location is:  [facility].    The chief complaint leading to consultation is:  As below.    Visit type: in-person.    Face-to-face time with patient: 42 minutes.    78 minutes in total were spent on this encounter, which includes face-to-face time and non-face-to-face time preparing to see the patient (e.g., review of tests), obtaining and/or reviewing separately obtained history, documenting clinical information in the electronic or other health record, independently interpreting results (not separately reported) and communicating results to the patient/family/caregiver, or coordinating care (not separately reported).     IMRESSION     Veronique Louise is a 45 y.o. yo female who was referred to genetic counseling due to her family history of cancer, for which she meets NCCN guidelines for testing (unaffected individual with a second degree relative (paternal aunt) who meets criteria due to history of multiple primary metachronous breast cancers). During the visit we discussed that Veronique's paternal aunt would be the most informative relative to have genetic testing at this time. She has a significant family history of colon cancer as well, but it does not meet NCCN guidelines for testing. However, Veronique has some family history on her paternal side which is unknown. She has an aunt who was diagnosed with a cancer, but it is unknown what type. We also discussed that it would be helpful to review IHC testing reports for  "her father's colon cancer diagnosis if available. Testing has been deferred so that Veronique can discuss the option of testing with her paternal aunt and obtain more information about family history. Next steps will be determined once she follows up via portal message.     SUBJECTIVE      Chief Complaint: Genetic evaluation (family history of colon cancer)    History of Present Illness (HPI):  Veronique Louise ("Veronique"), 45 y.o., assigned female sex at birth, is new to the [facility] Department of Hematology and Oncology and to me.  She was referred by Dr. González Alves (family medicine) for hereditary cancer risk assessment given her family history colon cancer.    Age:  45 y.o.   Race and ethnicity:  Black or , Not  or /a  Weight:  206 lb  Height:  5'4"  Previous germline cancer genetic testing:  No    Cancer History  No personal history of cancer   No personal history of masses/tumors/lesions    Breast History  Most recent mammogram: 9/25/24  Mammographic breast density:  scattered fibroglandular densities    Breast MRI:  No  Breast biopsy history and findings:  None  Thoracic radiation therapy history:  None     Gynecologic History  Age at menarche:  11y  Age at first live childbirth:  29y   Menopausal status:  premenopausal  Reproductive organs:  s/p salpingectomy in 2018     Hormone Use  Estrogen/Progesterone: No  OCPs: for around 2 years   Testosterone: No    GI History  Upper GI screening: N/A  Most recent colonoscopy: 11/30/22  Colon polyp:  No  Pancreatitis:  No    Dermatologic History  No issues reported  Abnormal moles/lesions: one mole on back biopsied in 2025, benign   Skin cancer screening: Yes, annually     Focused Social History  Tobacco Use: Low Risk  (7/2/2025)    Patient History     Smoking Tobacco Use: Never     Smokeless Tobacco Use: Never     Passive Exposure: Not on file     Review of Systems   Patient's Distress Score today was 6/10 (with 10 being the " worst).  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  Patient denies experiencing suicidal or homicidal ideations (SI/HI).  Offered patient a referral and patient is already connected to a therapist.     FAMILY HISTORY      ONCOLOGY PEDIGREE   Ashkenazi Orthodox:  No  Consanguinity:  No  Hereditary cancer genetic testing in blood relatives:  No    Family Cancer Pedigree:  Pedigree Image    Veronique reported her  family history of cancer as follows:   Father ( at 63y) diagnosed with colon cancer at 63y  Paternal aunt (in her 70s) diagnosed with breast cancer  Paternal aunt diagnosed with cancer, unknown type  Paternal aunt (62y) diagnosed with breast cancer at 58y, then again in the contralateral breast a few years later. Treated with double mastectomy.   Paternal grandmother ( at 80y) diagnosed with colon cancer in her late 70s   Paternal grandfather ( at 88y) diagnosed with colon cancer in his 80s     A family history of birth defects, intellectual disability, SIDS, sudden early death, multiple miscarriages and consanguinity were denied. Please refer to above pedigree for further details. A larger copy is available for review in the Media tab.     COUNSELING      Pre-test cancer genetic counseling was conducted.        Causes of Cancer:  Cancer occurs when cells grow out of control. Some genes help protect against cancer by controlling the growth of cells. However, mutations (problems) in these genes can prevent them from working properly, increasing the risk of developing cancer.  Sporadic Cancer: Most people who get cancer have sporadic cancer. Sporadic cancer is caused by mutations that occur during the lifetime. These mutations may be caused by aging, environmental exposures (ex. UV radiation, carcinogens), lifestyle (ex. smoking, drinking alcohol, sunbathing, poor diet), other medical conditions (ex. hepatitis, HPV, ulcerative colitis), or  other factors.   Hereditary Cancer: A small percentage (5-10%) of people who develop cancer were born with a mutation already in one of the cancer protection genes.  Familial Cancer: Cancer can also cluster in families that do not have an identifiable mutation. This may be due to shared environmental or lifestyle factors or genetic risk factors that have not been identified or cannot be detected using current technology or panels.     The likelihood of having a hereditary cancer risk depends on many factors including who in the family had cancer, what type of cancer they had, their age at diagnosis, cancer specifics (such as MMR status of an endometrial or colon cancer or type of breast cancer), and previous genetic testing in the family. Typically, the chance is higher for families that have multiple people with the same or related cancers, an individual with multiple cancers, younger ages of cancer, and certain types of cancer (such as pancreatic or triple negative breast cancer).     Most Informative Person to Test: Genetic testing usually provides the most information when completed for the family member who is most likely to test positive. This would be someone who had a personal history of suspicious cancer(s) (based on type, age, or number). If this individual tests negative, it is very unlikely that others in the family would have a hereditary cancer risk (unless others also have a suspicious personal history). If this family member tests positive, then testing would be recommended for other relatives.     If someone who does not have a personal history of suspicious cancer has genetic testing, a negative result is much more difficult to interpret (unless there is a known hereditary cancer predisposition in the family). There are several possibilities for a negative result in this situation:    The cancer in the family could be due to a hereditary cancer risk that this individual did not inherit. In this  case, a negative result would likely reduce the risk of related cancers.  The cancer in the family may not be due to an identifiable hereditary cancer risk. The cancers in the family may be related to shared environmental or lifestyle factors or a genetic factor that cannot be identified by the test completed using current technology. As a result, the risk of cancer may still be increased based on the family history.    In Veronique's family, the most appropriate individual to have genetic testing would be paternal aunt who was diagnosed with two primary breast cancers. Veronique stated that this individual may be able to undergo genetic testing. she plans to defer testing at this time and speak with her relatiev about undergoing genetic testing. she was encouraged to contact me after this discussion to determine the next steps.      Possible Results:    Positive (pathogenic or likely pathogenic variant): A genetic variant was found that is suspected or known to impact the function of the gene. The impact of a positive result on an individual's risk of cancer varies based on the gene, specific variant, individual's sex assigned at birth, personal cancer history, other health history (such as surgical history), and family history. A positive result can impact screening and risk management recommendations. However, there are not always available guidelines for management based on a specific gene variant. Family history and personal risk factors should always be considered. Sometimes, a positive result can also have treatment or reproductive implications.   Negative: No clinically significant variants were reported in the tested genes. A negative result does not indicate that an individual cannot develop cancer or even that the individual is at average risk. An individual may still be at an increased risk for cancer based on personal risk factors or family history. Additionally, there could be a hereditary cancer  predisposition that was not included in a chosen panel or is not detected with current technology.   Variant of Uncertain Significance (VUS): A variant was found. However, the lab does not have enough information to determine if the variant is benign (harmless) or pathogenic (impacts the function of the gene). The laboratory may update (reclassify) the variant over time as more information becomes available. When reclassified, most variants of uncertain significance are reclassified to benign/likely benign. Typically, it is not recommended to  based on the presence of a VUS. The chance of finding a VUS varies based on the test performed. Generally, the chance of finding a VUS increases with the number of genes tested and decreases with the amount of testing of that gene (genes that are tested more frequently or for a longer period of time have a lower VUS rate).     Genetic Mutation Inheritance:  When an individual has a gene mutation, their first-degree relatives (parents, children, and siblings) each have a 50% chance of carrying the same mutation. Other, more distant blood relatives can also be at risk of carrying the same mutation. At-risk relatives of an individual with a mutation should consider genetic testing to help determine their risk for cancer.     Genetic Discrimination: The Genetic Information Nondiscrimination Act of 2008 (STEVE) is a [place] federal law that provides some protections against the use of an individual's genetic information by their health insurer and by their employer. Title I of STEVE prohibits most health insurers (except for insurance obtained through a job with the  or the Federal Employees Health Benefits Plan) from utilizing an individual's genetic information to make decisions regarding insurance eligibility or premium charges. Title II of STVEE prohibits covered entities from requesting or requiring the genetic information of employees and applicants and  from using said information to make employment decisions. This does not apply to employers with fewer than 15 employees or to the .  STEVE also does not protect individuals from genetic discrimination by any other type of policy or entity, including but not limited to life insurance, disability insurance, long-term care insurance,  benefits, and  Health Services benefits.    Genetic Testing Options:   Various genetic testing panel options were discussed along with associated benefits, limitations, and risks.   There are several issues to consider regarding multi-gene testing:  Insurance coverage can vary depending on the genetic test panel(s) ordered.  There are limited data and a lack of clear guidelines regarding degree of cancer risk associated with some of the genes assessed and how to communicate and manage risk for carriers of these genes; this issue is compounded by the low incidence rates of hereditary disease; multi-gene tests include moderate-penetrance genes, and they often also include low-penetrance genes for which there are little available data regarding degree of cancer risk and guidelines for risk management.  Increased likelihood of detecting a genetic variant of unknown significance (VUS).  Increased chance of finding genotypically distinct cell lines (i.e., genetic mosaicism) with next-generation sequencing; clones of non-cancerous cell containing certain genetic mutations have been found in healthy adults undergoing multi-gene testing; this phenomenon can often be attributed to clonal hematopoiesis, a condition in which a hematopoietic stem cell begins making blood cells with the same acquired mutation.    The option for DNA only vs DNA+RNA was discussed. Adding RNA has a small chance of helping to clarify a VUS or detecting genetic variants that DNA only cannot (deep intronic variants). However, it must be conducted on a blood sample (not saliva).     Genetic Testing  Guidelines: Veronique's reported family history meets the genetic testing criteria established by the National Comprehensive Cancer Network Genetic/Familial High-Risk Assessment: Breast, Ovarian, and Pancreatic version 1.2025   Veronique has deferred testing at this time to speak with her paternal aunt, who would be the most informative relative to have testing at this time.    PREMM5 = 1.0 % likelihood of have a mutation in one of the Bartholomew syndrome genes. The NCCN recommends germline genetic testing with at least a 2.5% risk.    Breast Cancer Risk Stratification   Current, Estimated Breast Cancer Risk Model Used Patient's Score Patient's Risk Category   5-year Shalonda Model 0.8%  [] N/A given age <35   [x] Average risk (<1.7%)   [] Increased risk (>=1.7%)   10-year Tyrer-Cuzick v8.0b 2.06%  [x] <5%   [] >=5%    Lifetime (to age 85) Tyrer-Cuzick v8.0b 10.79%  [x] Average risk (<15%)   [] Intermediate risk (>=15% - <20%)   [] Increased risk (>=20%)     We discussed the differences between these models and how her personal and family history affects these risks. Veronique appeared to have a good understanding of her risk to develop breast cancer. Due to her < 20% lifetime risk to develop breast cancer, she is not eligible for breast MRI    ASSESSMENT / PLAN     Follow-up: Testing has been deferred so that Veronique can discuss the option of testing with her paternal aunt and obtain more information about family history. Next steps will be determined once she follows up via portal message.     Veronique appeared to have a good understanding of the information. Questions were encouraged and answered to the patient's satisfaction, and she verbalized understanding of the information and agreement with the plan.   Veronique is welcome to contact me if she has any questions, concerns, or updates to her family history.     This assessment is based on the history and reports provided, as well as the current scientific knowledge regarding  cancer genetics.     Carlos Sharpe MGC, [facility] [place]  Certified Genetic Counselor  Department of Hematology and Oncology  [facility]    CC:  Dr. González Alves

## 2025-07-11 ENCOUNTER — OFFICE VISIT (OUTPATIENT)
Dept: PSYCHIATRY | Facility: CLINIC | Age: 45
End: 2025-07-11
Payer: COMMERCIAL

## 2025-07-11 DIAGNOSIS — F41.1 GENERALIZED ANXIETY DISORDER: ICD-10-CM

## 2025-07-11 DIAGNOSIS — F31.81 BIPOLAR II DISORDER: Primary | ICD-10-CM

## 2025-07-11 DIAGNOSIS — F90.0 ATTENTION DEFICIT HYPERACTIVITY DISORDER (ADHD), PREDOMINANTLY INATTENTIVE TYPE: ICD-10-CM

## 2025-07-11 NOTE — PROGRESS NOTES
"  Outpatient Psychiatry Telemedicine Therapy Visit  [facility] - PSYCHIATRY  [facility], [place]   07/11/2025  Veronique Louise        History of Presenting Illness:      Pt presents for follow up for mood and interpersonal problems. Patient was seen, examined and chart was reviewed. Patient reviewed and agreed to informed consent and limits of confidentiality.    The patient location is: [place]  The chief complaint leading to consultation is: mood and relationship problems.      Visit type: audiovisual    Face to Face time with patient: 40   45 minutes of total time spent on the encounter, which includes face to face time and non-face to face time preparing to see the patient (eg, review of tests), Obtaining and/or reviewing separately obtained history, Documenting clinical information in the electronic or other health record, Independently interpreting results (not separately reported) and communicating results to the patient/family/caregiver, or Care coordination (not separately reported).     The session started with a review of therapy goal(s).     Interval History and Content of Current Session:  Content of Current Session:  Last session date: 7/2/2025     Problem(s) Discussed:  She is making positive changes in her life which she shared. She is using self coaching to help with her eating, especially sugar. Gave her credit. She is frustrated today dealing with her gf who is recovering from surgery and in pain. We discussed coping skills and how to handle "differences."    Explored what her needs are. "All I need is for her to stop fighting me."   Discussed how she coped with the anniversary of her daughter's death. She took a weekend by herself. She was able to get back to life on Monday. "It was hard."   Discussed ADHD executive functioning problems and how she christiane. Meds are helping her in the morning especially.   Symptoms/Impairment:   Frustration and stress. Grief " on the anniversary of her daughter's death.   In-session Interventions:  ACT and cognitive defusion. Positive self care and reinforced her positive self talk and positive self coaching.     Prognosis:   [x]  Prognosis favorable based on client's progress/motivation to participate   []  Other:       Medical Necessity for 18416 sessions, if applicable: (Longer session justified and medically necessary due to:)  []  Use of technique that take longer to implement (e.g. EMDR techniques/Systematic Desensitization/ DBT Protocol)  [x]  Severity of symptoms  []  More session time needed to process trauma-related emotions  []  More time needed to create and monitor relapse prevention protocols  [x]  Client has dual diagnosis -- requires extra time to address both diagnoses   []  Need to manage high-conflict interactions of couple/family  []  Extra time needed to monitor crisis issues and safety planning  []  Client has extreme difficulty identifying [emotions/motivations], requires more session time to process and gain insights   []  Not applicable  []  Other:        Risk Parameters:  Patient reports no suicidal ideation  Patient reports no homicidal ideation  Patient reports no self-injurious behavior  Patient reports no violent behavior    Diagnostic Impressions:  Diagnosis(es):   Bipolar II disorder  DALTON  ADHD    Treatment Plan:  1. Continue with Short-term therapy with this author     2. Pt instructed to call clinic, 911 or go to nearest emergency room if sxs worsen or pt is in crisis or suicidal. The pt expresses understanding.    Psychotherapy:   Target symptom(s):   Why chosen therapy is appropriate versus another modality: CBT used; relevant to diagnosis, patient responds to this modality  Outcome monitoring methods: self-report, observation  Therapeutic intervention type: individual psychotherapy  Topics discussed/themes: building skills sets for symptom management and symptom recognition  The patient's response to  the intervention and treatment is: good  The patient's progress toward treatment goals: good     Return to clinic:   One week    Charly Zambrano PsyD     Each patient to whom he or she provides medical services by telemedicine is:  (1) informed of the relationship between the physician and patient and the respective role of any other health care provider with respect to management of the patient; and (2) notified that he or she may decline to receive medical services by telemedicine and may withdraw from such care at any time.

## 2025-07-18 ENCOUNTER — OFFICE VISIT (OUTPATIENT)
Dept: PSYCHIATRY | Facility: CLINIC | Age: 45
End: 2025-07-18
Payer: COMMERCIAL

## 2025-07-18 DIAGNOSIS — F31.81 BIPOLAR II DISORDER: Primary | ICD-10-CM

## 2025-07-18 DIAGNOSIS — F41.1 GENERALIZED ANXIETY DISORDER: ICD-10-CM

## 2025-07-18 DIAGNOSIS — F90.0 ATTENTION DEFICIT HYPERACTIVITY DISORDER (ADHD), PREDOMINANTLY INATTENTIVE TYPE: ICD-10-CM

## 2025-07-18 PROCEDURE — 1159F MED LIST DOCD IN RCRD: CPT | Mod: CPTII,95,, | Performed by: PSYCHOLOGIST

## 2025-07-18 PROCEDURE — 90837 PSYTX W PT 60 MINUTES: CPT | Mod: 95,,, | Performed by: PSYCHOLOGIST

## 2025-07-18 NOTE — PROGRESS NOTES
"  Outpatient Psychiatry Telemedicine Therapy Visit  [facility] - PSYCHIATRY  [facility], [place]   07/18/2025  Veronique Louise        History of Presenting Illness:      Pt presents for follow up. Patient was seen, examined and chart was reviewed. Patient reviewed and agreed to informed consent and limits of confidentiality.    The patient location is: [place]  The chief complaint leading to consultation is:  mood and relationship problems.     Visit type: audiovisual    Face to Face time with patient: 50  60 minutes of total time spent on the encounter, which includes face to face time and non-face to face time preparing to see the patient (eg, review of tests), Obtaining and/or reviewing separately obtained history, Documenting clinical information in the electronic or other health record, Independently interpreting results (not separately reported) and communicating results to the patient/family/caregiver, or Care coordination (not separately reported).     The session started with a review of therapy goal(s).     Interval History and Content of Current Session:  Content of Current Session:  Last session date: 7/11/2025     Problem(s) Discussed:  "I am really in my feelings." She is talking fast. Helped he process her thoughts and feelings using ACT.   Her daughter had sex pics on her phone.   Her gf is having surgery and she had to change her vacation plans  She said she is working hard on her emotional regulation. Helped her process through how she wants to handle these situations.  She developed a plan to deal with her daughter. She told me she wanted to do more self care.   "I feel frustrated and isolated in this."    Symptoms/Impairment:   Frustration, irritability, emotional overwhelm, and feeling alone in her feelings    In-session Interventions:  ACT and stress management. Helped develop self care plan. Discussed a gentle morning routine.     Prognosis:   [x]  " Prognosis favorable based on client's progress/motivation to participate   []  Other:       Medical Necessity for 66457 sessions, if applicable: (Longer session justified and medically necessary due to:)  []  Use of technique that take longer to implement (e.g. EMDR techniques/Systematic Desensitization/ DBT Protocol)  [x]  Severity of symptoms  []  More session time needed to process trauma-related emotions  []  More time needed to create and monitor relapse prevention protocols  [x]  Client has dual diagnosis -- requires extra time to address both diagnoses   []  Need to manage high-conflict interactions of couple/family  []  Extra time needed to monitor crisis issues and safety planning  []  Client has extreme difficulty identifying [emotions/motivations], requires more session time to process and gain insights   []  Not applicable  []  Other:        Risk Parameters:  Patient reports no suicidal ideation  Patient reports no homicidal ideation  Patient reports no self-injurious behavior  Patient reports no violent behavior    Diagnostic Impressions:  Diagnosis(es):   Bipolar II Disorder  DALTON  ADHD    Treatment Plan:  1. Continue with Short-term therapy with this author     2. Pt instructed to call clinic, 911 or go to nearest emergency room if sxs worsen or pt is in crisis or suicidal. The pt expresses understanding.    Psychotherapy:   Target symptom(s):   Why chosen therapy is appropriate versus another modality: CBT used; relevant to diagnosis, patient responds to this modality  Outcome monitoring methods: self-report, observation  Therapeutic intervention type: ACT and stress management  Topics discussed/themes: building skills sets for symptom management and symptom recognition  The patient's response to the intervention and treatment is: good  The patient's progress toward treatment goals: very good     Return to clinic:   1 week    Charly Zambrano PsyD     Each patient to whom he or she provides medical  services by telemedicine is:  (1) informed of the relationship between the physician and patient and the respective role of any other health care provider with respect to management of the patient; and (2) notified that he or she may decline to receive medical services by telemedicine and may withdraw from such care at any time.

## 2025-07-23 ENCOUNTER — OFFICE VISIT (OUTPATIENT)
Dept: PSYCHIATRY | Facility: CLINIC | Age: 45
End: 2025-07-23
Payer: COMMERCIAL

## 2025-07-23 DIAGNOSIS — F41.1 GENERALIZED ANXIETY DISORDER: ICD-10-CM

## 2025-07-23 DIAGNOSIS — F31.81 BIPOLAR II DISORDER: Primary | ICD-10-CM

## 2025-07-23 DIAGNOSIS — F90.0 ATTENTION DEFICIT HYPERACTIVITY DISORDER (ADHD), PREDOMINANTLY INATTENTIVE TYPE: ICD-10-CM

## 2025-07-23 PROCEDURE — 90837 PSYTX W PT 60 MINUTES: CPT | Mod: 95,,, | Performed by: PSYCHOLOGIST

## 2025-07-23 PROCEDURE — 1159F MED LIST DOCD IN RCRD: CPT | Mod: CPTII,95,, | Performed by: PSYCHOLOGIST

## 2025-07-23 NOTE — PROGRESS NOTES
"  Outpatient Psychiatry Telemedicine Therapy Visit  [facility] PSYCHIATRY  [facility], [place]   07/23/2025  Veronique Louise        History of Presenting Illness:      Pt presents for a follow up virtual visit. Patient was seen, examined and chart was reviewed. Patient reviewed and agreed to informed consent and limits of confidentiality.    The patient location is: [place]  The chief complaint leading to consultation is: mood and relationship problems.      Visit type: audiovisual    Face to Face time with patient: 53  60 minutes of total time spent on the encounter, which includes face to face time and non-face to face time preparing to see the patient (eg, review of tests), Obtaining and/or reviewing separately obtained history, Documenting clinical information in the electronic or other health record, Independently interpreting results (not separately reported) and communicating results to the patient/family/caregiver, or Care coordination (not separately reported).     The session started with a review of therapy goal(s).     Interval History and Content of Current Session:  Content of Current Session:  Last session date: 7/18/2025     Problem(s) Discussed:  She had a big insight. Her gf whom she thought she wanted to leave her marriage for has BPD. She sees it clearly. She saw the emotional immaturity.   "This has changed everything."   Now that she has had this insight and is having better boundaries with her friend she feels more calm and at peace. She is enjoying the feeling.   Encouraged her to refocus on herself and family. She is going on vacation. She brain stormed ways she wanted to care for herself.   Discussion of her bipolar versus BPD.   Symptoms/Impairment:   Fear, anxiety, confusion, hypomania    In-session Interventions:  Affirming her insight, reinforcing her gains, refocus on self care    Prognosis:   [x]  Prognosis favorable based on client's " progress/motivation to participate   []  Other:       Medical Necessity for 53960 sessions, if applicable: (Longer session justified and medically necessary due to:)  []  Use of technique that take longer to implement (e.g. EMDR techniques/Systematic Desensitization/ DBT Protocol)  [x]  Severity of symptoms  []  More session time needed to process trauma-related emotions  []  More time needed to create and monitor relapse prevention protocols  [x]  Client has dual diagnosis -- requires extra time to address both diagnoses   []  Need to manage high-conflict interactions of couple/family  []  Extra time needed to monitor crisis issues and safety planning  []  Client has extreme difficulty identifying [emotions/motivations], requires more session time to process and gain insights   []  Not applicable  []  Other:        Risk Parameters:  Patient reports no suicidal ideation  Patient reports no homicidal ideation  Patient reports no self-injurious behavior  Patient reports no violent behavior    Diagnostic Impressions:  Diagnosis(es):     Bipolar II Disorder  DALTON  ADHD    Treatment Plan:  1. Continue with Short-term therapy with this author     2. Pt instructed to call clinic, 911 or go to nearest emergency room if sxs worsen or pt is in crisis or suicidal. The pt expresses understanding.    Psychotherapy:   Target symptom(s):   Why chosen therapy is appropriate versus another modality: CBT used; relevant to diagnosis, patient responds to this modality  Outcome monitoring methods: self-report, observation  Therapeutic intervention type: individual audiovisual session  Topics discussed/themes: building skills sets for symptom management and symptom recognition  The patient's response to the intervention and treatment is: good  The patient's progress toward treatment goals: good     Return to clinic:   1 week    Charly Zambrano PsyD     Each patient to whom he or she provides medical services by telemedicine is:  (1)  informed of the relationship between the physician and patient and the respective role of any other health care provider with respect to management of the patient; and (2) notified that he or she may decline to receive medical services by telemedicine and may withdraw from such care at any time.

## 2025-08-01 ENCOUNTER — OFFICE VISIT (OUTPATIENT)
Dept: PSYCHIATRY | Facility: CLINIC | Age: 45
End: 2025-08-01
Payer: COMMERCIAL

## 2025-08-01 DIAGNOSIS — F41.1 GENERALIZED ANXIETY DISORDER: ICD-10-CM

## 2025-08-01 DIAGNOSIS — F90.0 ATTENTION DEFICIT HYPERACTIVITY DISORDER (ADHD), PREDOMINANTLY INATTENTIVE TYPE: ICD-10-CM

## 2025-08-01 DIAGNOSIS — F31.81 BIPOLAR II DISORDER: Primary | ICD-10-CM

## 2025-08-01 NOTE — PROGRESS NOTES
"PROGRESS NOTE  Audiovisual Follow Up Session      Veronique Louise  8/1/2025    Face to Face time with patient: 50  60 minutes of total time spent on the encounter, which includes face to face time and non-face to face time preparing to see the patient (eg, review of tests), Obtaining and/or reviewing separately obtained history, Documenting clinical information in the electronic or other health record, Independently interpreting results (not separately reported) and communicating results to the patient/family/caregiver, or Care coordination (not separately reported).     Each patient to whom he or she provides medical services by telemedicine is:  (1) informed of the relationship between the physician and patient and the respective role of any other health care provider with respect to management of the patient; and (2) notified that he or she may decline to receive medical services by telemedicine and may withdraw from such care at any time.    The patient location is: [place]    The chief complaint leading to consultation is: bipolar disorder, mood swings, anger, and relationship problems.    Visit type: audiovisual  History of Presenting Illness:      Pt presents for a follow up visit. Patient was seen, examined and chart was reviewed. Patient reviewed and agreed to informed consent and limits of confidentiality.    The session started with a review of therapy goal(s).     Interval History and Content of Current Session:  Content of Current Session:  Last session date: 7/23/2025     Problem(s) Discussed:  She is discussing how she has changed in regards to her relationship with her gf. She has gained insight into her gf behaviors and decided she needs to make changes. She feels like she has trauma from her gf actions when her gf was recovering from surgery. Helped he the patient process through her trauma and reinforced her emotional maturity gains.   "This whole experience made me not hate " "myself."  She is improved in her reactions to her . She reported she is more thankful for her life.   She was tearful in session when processing her pain and she acknowledged her growth at the some time. Her mother has seen an improvement in her mood and engagement with the family.   She is sad she is not exercising. Spent time discussing how to motivate herself to go for a walk.  She is still practicing the stress management and awareness skills throughout the day with good results.   Set goals for the week. Pt wants to cook two meals this next week. She committed to 15 minutes of play 3x next week.   Symptoms/Impairment:   Self-loathing, hurt, lack of exercise, lack of motivation for self-care    In-session Interventions:  Activity scheduling, behavioral activation, stress management, executive functioning psychoeducation to treat ADHD    Prognosis:   [x]  Prognosis favorable based on client's progress/motivation to participate   []  Other:       Medical Necessity for 07523 sessions, if applicable: (Longer session justified and medically necessary due to:)  []  Use of technique that take longer to implement (e.g. EMDR techniques/Systematic Desensitization/ DBT Protocol)  [x]  Severity of symptoms  []  More session time needed to process trauma-related emotions  []  More time needed to create and monitor relapse prevention protocols  [x]  Client has dual diagnosis -- requires extra time to address both diagnoses   []  Need to manage high-conflict interactions of couple/family  []  Extra time needed to monitor crisis issues and safety planning  []  Client has extreme difficulty identifying [emotions/motivations], requires more session time to process and gain insights   []  Not applicable  []  Other:        Risk Parameters:  Patient reports no suicidal ideation  Patient reports no homicidal ideation  Patient reports no self-injurious behavior  Patient reports no violent behavior    Diagnostic " Impressions:  Diagnosis(es):     Bipolar II Disorder  DALTON  ADHD    Treatment Plan:  1. Continue with Short-term therapy with this author     2. Pt instructed to call clinic, 911 or go to nearest emergency room if sxs worsen or pt is in crisis or suicidal. The pt expresses understanding.    Psychotherapy:   Target symptom(s):   Why chosen therapy is appropriate versus another modality: CBT used; relevant to diagnosis, patient responds to this modality  Outcome monitoring methods: self-report, observation  Therapeutic intervention type: CBT, stress mgmt  Topics discussed/themes: building skills sets for symptom management and symptom recognition  The patient's response to the intervention and treatment is: good  The patient's progress toward treatment goals: good     Return to clinic:   1 week     Charly Zambrano PsyD

## 2025-08-07 ENCOUNTER — LAB VISIT (OUTPATIENT)
Dept: LAB | Facility: OTHER | Age: 45
End: 2025-08-07
Attending: STUDENT IN AN ORGANIZED HEALTH CARE EDUCATION/TRAINING PROGRAM
Payer: COMMERCIAL

## 2025-08-07 DIAGNOSIS — D64.9 ANEMIA, UNSPECIFIED TYPE: ICD-10-CM

## 2025-08-07 DIAGNOSIS — Z00.00 ANNUAL PHYSICAL EXAM: ICD-10-CM

## 2025-08-07 LAB
ALBUMIN SERPL BCP-MCNC: 4.1 G/DL (ref 3.5–5.2)
ALP SERPL-CCNC: 46 UNIT/L (ref 40–150)
ALT SERPL W/O P-5'-P-CCNC: 15 UNIT/L (ref 10–44)
ANION GAP (OHS): 8 MMOL/L (ref 8–16)
AST SERPL-CCNC: 22 UNIT/L (ref 11–45)
BILIRUB SERPL-MCNC: 0.4 MG/DL (ref 0.1–1)
BUN SERPL-MCNC: 11 MG/DL (ref 6–20)
CALCIUM SERPL-MCNC: 9.2 MG/DL (ref 8.7–10.5)
CHLORIDE SERPL-SCNC: 104 MMOL/L (ref 95–110)
CHOLEST SERPL-MCNC: 194 MG/DL (ref 120–199)
CHOLEST/HDLC SERPL: 2.8 {RATIO} (ref 2–5)
CO2 SERPL-SCNC: 25 MMOL/L (ref 23–29)
CREAT SERPL-MCNC: 0.6 MG/DL (ref 0.5–1.4)
EAG (OHS): 111 MG/DL (ref 68–131)
ERYTHROCYTE [DISTWIDTH] IN BLOOD BY AUTOMATED COUNT: 12.7 % (ref 11.5–14.5)
FERRITIN SERPL-MCNC: 62 NG/ML (ref 20–300)
GFR SERPLBLD CREATININE-BSD FMLA CKD-EPI: >60 ML/MIN/1.73/M2
GLUCOSE SERPL-MCNC: 104 MG/DL (ref 70–110)
HBA1C MFR BLD: 5.5 % (ref 4–5.6)
HCT VFR BLD AUTO: 39.1 % (ref 37–48.5)
HDLC SERPL-MCNC: 70 MG/DL (ref 40–75)
HDLC SERPL: 36.1 % (ref 20–50)
HGB BLD-MCNC: 12.6 GM/DL (ref 12–16)
IRON SATN MFR SERPL: 15 % (ref 20–50)
IRON SERPL-MCNC: 63 UG/DL (ref 30–160)
LDLC SERPL CALC-MCNC: 113.6 MG/DL (ref 63–159)
MCH RBC QN AUTO: 29.9 PG (ref 27–31)
MCHC RBC AUTO-ENTMCNC: 32.2 G/DL (ref 32–36)
MCV RBC AUTO: 93 FL (ref 82–98)
NONHDLC SERPL-MCNC: 124 MG/DL
PLATELET # BLD AUTO: 266 K/UL (ref 150–450)
PMV BLD AUTO: 9.9 FL (ref 9.2–12.9)
POTASSIUM SERPL-SCNC: 4.4 MMOL/L (ref 3.5–5.1)
PROT SERPL-MCNC: 7.8 GM/DL (ref 6–8.4)
RBC # BLD AUTO: 4.21 M/UL (ref 4–5.4)
SODIUM SERPL-SCNC: 137 MMOL/L (ref 136–145)
T4 FREE SERPL-MCNC: 1.1 NG/DL (ref 0.71–1.51)
TIBC SERPL-MCNC: 425 UG/DL (ref 250–450)
TRANSFERRIN SERPL-MCNC: 287 MG/DL (ref 200–375)
TRIGL SERPL-MCNC: 52 MG/DL (ref 30–150)
TSH SERPL-ACNC: 0.22 UIU/ML (ref 0.4–4)
WBC # BLD AUTO: 6.71 K/UL (ref 3.9–12.7)

## 2025-08-07 PROCEDURE — 36415 COLL VENOUS BLD VENIPUNCTURE: CPT

## 2025-08-07 PROCEDURE — 83036 HEMOGLOBIN GLYCOSYLATED A1C: CPT

## 2025-08-07 PROCEDURE — 85027 COMPLETE CBC AUTOMATED: CPT

## 2025-08-07 PROCEDURE — 82465 ASSAY BLD/SERUM CHOLESTEROL: CPT

## 2025-08-07 PROCEDURE — 84466 ASSAY OF TRANSFERRIN: CPT

## 2025-08-07 PROCEDURE — 84075 ASSAY ALKALINE PHOSPHATASE: CPT

## 2025-08-07 PROCEDURE — 84443 ASSAY THYROID STIM HORMONE: CPT

## 2025-08-07 PROCEDURE — 82728 ASSAY OF FERRITIN: CPT

## 2025-08-08 ENCOUNTER — OFFICE VISIT (OUTPATIENT)
Dept: PSYCHIATRY | Facility: CLINIC | Age: 45
End: 2025-08-08
Payer: COMMERCIAL

## 2025-08-08 DIAGNOSIS — F31.81 BIPOLAR II DISORDER: Primary | ICD-10-CM

## 2025-08-08 DIAGNOSIS — F41.1 GENERALIZED ANXIETY DISORDER: ICD-10-CM

## 2025-08-08 NOTE — PROGRESS NOTES
Office Visit  [facility] PSYCHIATRY  [facility], [place]   08/08/2025  Veronique Louise       Progress Note    Therapeutic Intervention: 41691 Individual Psychotherapy      Chief Complaint/Reason for Encounter: Mood problems, negative thinking, financial concerns, parenting issues     History of Presenting Illness: Pt has bipolar, anxiety, and ADHD      Pt presents for a follow up visit.  Patient was seen, examined and chart was reviewed. Patient reviewed and agreed to informed consent and limits of confidentiality.    The patient location is: [place]    Visit type: audiovisual    Face to Face time with patient: 50  60 minutes of total time spent on the encounter, which includes face to face time and non-face to face time preparing to see the patient (eg, review of tests), Obtaining and/or reviewing separately obtained history, Documenting clinical information in the electronic or other health record, Independently interpreting results (not separately reported) and communicating results to the patient/family/caregiver, or Care coordination (not separately reported).     The session started with a review of therapy goal(s).     Interval History and Content of Current Session:  Content of Current Session:  Last session date: 8/1/2025     Problem(s) Discussed:  She is worried about her  who is unemployed and not motivated to find a high paying job.   She wanted to discuss issues she is having with her daughter and substances. She was proud of the way she handled it. Helped her process through her concerns and stressors using ACT.   Her gf has BPD and the pt is learning to use boundaries. She loves her and she wants to communicate more authentically with her. She processed through her stressful thoughts and feelings about her gf who is not managing her life.  She reported being put in multiple double binds with her gf and it makes her frustrated.   Symptoms/Impairment:    Anxiety, stress, negative thoughts and esteem    In-session Interventions:  Psychoeducation on CBT as a daily practice to improve mood. Thought record and thought download practice was suggested. Psychoeducation on personality disorders.   Prognosis:   [x]  Prognosis favorable based on client's progress/motivation to participate   []  Other:       Medical Necessity for 35531 sessions, if applicable: (Longer session justified and medically necessary due to:)  []  Use of technique that take longer to implement (e.g. EMDR techniques/Systematic Desensitization/ DBT Protocol)  [x]  Severity of symptoms  []  More session time needed to process trauma-related emotions  []  More time needed to create and monitor relapse prevention protocols  []  Client has dual diagnosis -- requires extra time to address both diagnoses   []  Need to manage high-conflict interactions of couple/family  []  Extra time needed to monitor crisis issues and safety planning  []  Client has extreme difficulty identifying [emotions/motivations], requires more session time to process and gain insights   []  Not applicable  []  Other:        Risk Parameters:  Patient reports no suicidal ideation  Patient reports no homicidal ideation  Patient reports no self-injurious behavior  Patient reports no violent behavior    Diagnostic Impressions:   Bipolar II disorder  DALTON  ADHD    Treatment Plan:   Target symptom(s):   Why chosen therapy is appropriate versus another modality: CBT used; relevant to diagnosis, patient responds to this modality  Outcome monitoring methods: self-report, observation  Therapeutic intervention type: individual psychotherapy  Topics discussed/themes: building skills sets for symptom management and symptom recognition  The patient's response to the intervention and treatment is: good  The patient's progress toward treatment goals: good  Continue with Short-term therapy with this author   Patient instructed to call clinic, [phone]  or present to the nearest emergency room if they experience suicidal or homicidal ideation, intent or plan.      Return to Clinic: 1 week    Each patient to whom he or she provides medical services by telemedicine is:  (1) informed of the relationship between the physician and patient and the respective role of any other health care provider with respect to management of the patient; and (2) notified that he or she may decline to receive medical services by telemedicine and may withdraw from such care at any time.       Charly Zambrano PsyD  Licensed Clinical Psychologist

## 2025-08-15 ENCOUNTER — OFFICE VISIT (OUTPATIENT)
Dept: PSYCHIATRY | Facility: CLINIC | Age: 45
End: 2025-08-15
Payer: COMMERCIAL

## 2025-08-15 DIAGNOSIS — F41.1 GENERALIZED ANXIETY DISORDER: ICD-10-CM

## 2025-08-15 DIAGNOSIS — F90.0 ATTENTION DEFICIT HYPERACTIVITY DISORDER (ADHD), PREDOMINANTLY INATTENTIVE TYPE: Primary | ICD-10-CM

## 2025-08-15 DIAGNOSIS — F31.81 BIPOLAR II DISORDER: ICD-10-CM

## 2025-08-15 PROCEDURE — 1159F MED LIST DOCD IN RCRD: CPT | Mod: CPTII,95,, | Performed by: PSYCHOLOGIST

## 2025-08-15 PROCEDURE — 3044F HG A1C LEVEL LT 7.0%: CPT | Mod: CPTII,95,, | Performed by: PSYCHOLOGIST

## 2025-08-15 PROCEDURE — 90837 PSYTX W PT 60 MINUTES: CPT | Mod: 95,,, | Performed by: PSYCHOLOGIST

## 2025-08-21 ENCOUNTER — OFFICE VISIT (OUTPATIENT)
Dept: PSYCHIATRY | Facility: CLINIC | Age: 45
End: 2025-08-21
Payer: COMMERCIAL

## 2025-08-21 DIAGNOSIS — F41.1 GAD (GENERALIZED ANXIETY DISORDER): ICD-10-CM

## 2025-08-21 DIAGNOSIS — F31.81 BIPOLAR II DISORDER: Primary | ICD-10-CM

## 2025-08-21 DIAGNOSIS — F90.2 ATTENTION DEFICIT HYPERACTIVITY DISORDER (ADHD), COMBINED TYPE: ICD-10-CM

## 2025-08-21 PROCEDURE — 3044F HG A1C LEVEL LT 7.0%: CPT | Mod: CPTII,95,, | Performed by: PSYCHOLOGIST

## 2025-08-21 PROCEDURE — 1159F MED LIST DOCD IN RCRD: CPT | Mod: CPTII,95,, | Performed by: PSYCHOLOGIST

## 2025-08-21 PROCEDURE — 90837 PSYTX W PT 60 MINUTES: CPT | Mod: 95,,, | Performed by: PSYCHOLOGIST

## 2025-08-27 ENCOUNTER — OFFICE VISIT (OUTPATIENT)
Dept: PSYCHIATRY | Facility: CLINIC | Age: 45
End: 2025-08-27
Payer: COMMERCIAL

## 2025-08-27 DIAGNOSIS — F90.0 ATTENTION DEFICIT HYPERACTIVITY DISORDER (ADHD), PREDOMINANTLY INATTENTIVE TYPE: ICD-10-CM

## 2025-08-27 DIAGNOSIS — F31.81 BIPOLAR II DISORDER: Primary | ICD-10-CM

## 2025-08-27 PROCEDURE — 1159F MED LIST DOCD IN RCRD: CPT | Mod: CPTII,95,, | Performed by: PSYCHOLOGIST

## 2025-08-27 PROCEDURE — 3044F HG A1C LEVEL LT 7.0%: CPT | Mod: CPTII,95,, | Performed by: PSYCHOLOGIST

## 2025-08-27 PROCEDURE — 90837 PSYTX W PT 60 MINUTES: CPT | Mod: 95,,, | Performed by: PSYCHOLOGIST

## (undated) DEVICE — TROCAR KII FIOS 5MM X 100MM

## (undated) DEVICE — SEALER LIGASURE LAP 37CM 5MM

## (undated) DEVICE — SEE MEDLINE ITEM 157117

## (undated) DEVICE — CLOSURE SKIN STERI STRIP 1/2X4

## (undated) DEVICE — NDL INSUF ULTRA VERESS 120MM

## (undated) DEVICE — KIT WING PAD POSITIONING

## (undated) DEVICE — SYR 10CC LUER LOCK

## (undated) DEVICE — SOL CLEARIFY VISUALIZATION LAP

## (undated) DEVICE — SUT MCRYL PLUS 4-0 PS2 27IN

## (undated) DEVICE — SEE MEDLINE ITEM 152622

## (undated) DEVICE — PACK LAPAROSCOPY BAPTIST

## (undated) DEVICE — SOL 9P NACL IRR PIC IL

## (undated) DEVICE — GLOVE ENCORE ORTHO PWDR BRN 7

## (undated) DEVICE — ELECTRODE REM PLYHSV RETURN 9